# Patient Record
Sex: FEMALE | Race: WHITE | NOT HISPANIC OR LATINO | Employment: OTHER | URBAN - NONMETROPOLITAN AREA
[De-identification: names, ages, dates, MRNs, and addresses within clinical notes are randomized per-mention and may not be internally consistent; named-entity substitution may affect disease eponyms.]

---

## 2023-12-13 ENCOUNTER — APPOINTMENT (OUTPATIENT)
Dept: PRIMARY CARE | Facility: CLINIC | Age: 78
End: 2023-12-13
Payer: COMMERCIAL

## 2024-01-04 ENCOUNTER — HOSPITAL ENCOUNTER (EMERGENCY)
Facility: HOSPITAL | Age: 79
Discharge: HOME | End: 2024-01-04
Attending: EMERGENCY MEDICINE
Payer: COMMERCIAL

## 2024-01-04 VITALS
RESPIRATION RATE: 18 BRPM | OXYGEN SATURATION: 96 % | HEIGHT: 56 IN | TEMPERATURE: 97.5 F | BODY MASS INDEX: 32.62 KG/M2 | WEIGHT: 145 LBS | SYSTOLIC BLOOD PRESSURE: 162 MMHG | HEART RATE: 65 BPM | DIASTOLIC BLOOD PRESSURE: 69 MMHG

## 2024-01-04 DIAGNOSIS — M54.50 CHRONIC LEFT-SIDED LOW BACK PAIN WITHOUT SCIATICA: Primary | ICD-10-CM

## 2024-01-04 DIAGNOSIS — G89.29 CHRONIC LEFT-SIDED LOW BACK PAIN WITHOUT SCIATICA: Primary | ICD-10-CM

## 2024-01-04 PROCEDURE — 99284 EMERGENCY DEPT VISIT MOD MDM: CPT | Performed by: EMERGENCY MEDICINE

## 2024-01-04 PROCEDURE — 96372 THER/PROPH/DIAG INJ SC/IM: CPT

## 2024-01-04 PROCEDURE — 2500000001 HC RX 250 WO HCPCS SELF ADMINISTERED DRUGS (ALT 637 FOR MEDICARE OP): Performed by: EMERGENCY MEDICINE

## 2024-01-04 PROCEDURE — 2500000004 HC RX 250 GENERAL PHARMACY W/ HCPCS (ALT 636 FOR OP/ED): Performed by: EMERGENCY MEDICINE

## 2024-01-04 PROCEDURE — 96372 THER/PROPH/DIAG INJ SC/IM: CPT | Performed by: EMERGENCY MEDICINE

## 2024-01-04 PROCEDURE — 99283 EMERGENCY DEPT VISIT LOW MDM: CPT | Performed by: EMERGENCY MEDICINE

## 2024-01-04 RX ORDER — CYCLOBENZAPRINE HCL 10 MG
5 TABLET ORAL ONCE
Status: DISCONTINUED | OUTPATIENT
Start: 2024-01-04 | End: 2024-01-04 | Stop reason: HOSPADM

## 2024-01-04 RX ORDER — KETOROLAC TROMETHAMINE 30 MG/ML
15 INJECTION, SOLUTION INTRAMUSCULAR; INTRAVENOUS ONCE
Status: COMPLETED | OUTPATIENT
Start: 2024-01-04 | End: 2024-01-04

## 2024-01-04 RX ORDER — TRAMADOL HYDROCHLORIDE 50 MG/1
50 TABLET ORAL ONCE
Status: COMPLETED | OUTPATIENT
Start: 2024-01-04 | End: 2024-01-04

## 2024-01-04 RX ORDER — TRAMADOL HYDROCHLORIDE 50 MG/1
50 TABLET ORAL EVERY 6 HOURS PRN
Qty: 12 TABLET | Refills: 0 | Status: SHIPPED | OUTPATIENT
Start: 2024-01-04 | End: 2024-01-07

## 2024-01-04 RX ADMIN — KETOROLAC TROMETHAMINE 15 MG: 30 INJECTION, SOLUTION INTRAMUSCULAR; INTRAVENOUS at 07:21

## 2024-01-04 RX ADMIN — TRAMADOL HYDROCHLORIDE 50 MG: 50 TABLET, COATED ORAL at 08:27

## 2024-01-04 ASSESSMENT — PAIN - FUNCTIONAL ASSESSMENT: PAIN_FUNCTIONAL_ASSESSMENT: 0-10

## 2024-01-04 ASSESSMENT — PAIN SCALES - GENERAL
PAINLEVEL_OUTOF10: 8
PAINLEVEL_OUTOF10: 8

## 2024-01-04 ASSESSMENT — PAIN DESCRIPTION - DESCRIPTORS: DESCRIPTORS: CRAMPING

## 2024-01-04 ASSESSMENT — PAIN DESCRIPTION - PAIN TYPE: TYPE: ACUTE PAIN

## 2024-01-04 ASSESSMENT — COLUMBIA-SUICIDE SEVERITY RATING SCALE - C-SSRS
2. HAVE YOU ACTUALLY HAD ANY THOUGHTS OF KILLING YOURSELF?: NO
1. IN THE PAST MONTH, HAVE YOU WISHED YOU WERE DEAD OR WISHED YOU COULD GO TO SLEEP AND NOT WAKE UP?: NO
6. HAVE YOU EVER DONE ANYTHING, STARTED TO DO ANYTHING, OR PREPARED TO DO ANYTHING TO END YOUR LIFE?: NO

## 2024-01-04 ASSESSMENT — PAIN DESCRIPTION - LOCATION: LOCATION: BACK

## 2024-01-04 ASSESSMENT — PAIN DESCRIPTION - FREQUENCY: FREQUENCY: CONSTANT/CONTINUOUS

## 2024-01-04 NOTE — ED PROVIDER NOTES
HPI   Chief Complaint   Patient presents with    Back Pain     Pt is c/o lower back pain. Non radiating.        Limitations to History: None    HPI: 78-year-old female presents by EMS with concern for back pain.  States is her bilateral lower back.  History of chronic pain.  States that she has been having intermittent spasm.  Worse with movement.  Denies any fever, chills, loss of bowel or bladder, lower extremity numbness or tingling.  Denies any fall or trauma.    Additional History Obtained from: EMS    ------------------------------------------------------------------------------------------------------------------------------------------  Physical Exam:    VS: As documented in the triage note and EMR flowsheet from this visit were reviewed.    Appearance: Alert. cooperative,  in no acute distress.   Skin: Intact,  dry skin, no lesions, rash, petechiae or purpura.   Eyes: PERRLA, EOMs intact,  Conjunctiva pink with no redness or exudates.   HENT: Normocephalic, atraumatic. Nares patent. No intraoral lesions.   Neck: Supple, without meningismus. Trachea at midline. No lymphadenopathy.  Pulmonary: Clear bilaterally with good chest wall excursion. No rales, rhonchi or wheezing. No accessory muscle use or stridor.  Cardiac: Regular rate and rhythm, no rubs, murmurs, or gallops.   Abdomen: Abdomen is soft, nontender, and nondistended.  No palpable organomegaly.  No rebound or guarding.  No CVA tenderness. Nonsurgical abdomen  Genitourinary: Exam deferred.  Musculoskeletal: Full range of motion.  Pulses full and equal. No cyanosis, clubbing, or edema.  Tenderness to palpation of bilateral lumbar paraspinal musculature.  Neurological:  Cranial nerves are grossly intact, grossly normal sensation, no weakness, no focal findings identified.  Psychiatric: Appropriate mood and affect.                            Blain Coma Scale Score: 15                  Patient History   Past Medical History:   Diagnosis Date     Diabetes mellitus (CMS/HCC)     Hypertension      Past Surgical History:   Procedure Laterality Date    APPENDECTOMY      EYE SURGERY       No family history on file.  Social History     Tobacco Use    Smoking status: Never    Smokeless tobacco: Never   Substance Use Topics    Alcohol use: Not on file    Drug use: Not on file       Physical Exam   ED Triage Vitals [01/04/24 0647]   Temp Heart Rate Resp BP   36.4 °C (97.5 °F) 71 16 153/75      SpO2 Temp Source Heart Rate Source Patient Position   96 % Tympanic Monitor Sitting      BP Location FiO2 (%)     Left arm --       Physical Exam    ED Course & MDM   Diagnoses as of 01/04/24 0940   Chronic left-sided low back pain without sciatica       Medical Decision Making  Medical Decision Making:    Patient appears well nontoxic.  No red flag symptoms for cauda equina syndrome.  Chronic back pain.  Patient initially treated with intramuscular Toradol.  Patient declining cyclobenzaprine.  Patient states that tramadol has worked in the past.  Treated with 50 mg orally and patient feels much improved.  Will be given a short course for home.  No indication for imaging as there is no fall.  Advised on follow-up with primary care.  Stable at time of discharge.    Differential Diagnoses Considered: Lumbar strain, disc herniation, fracture, cauda equina syndrome, sciatica    Escalation of Care:  Appropriate for discharge and follow-up with primary care.    Prescription Drug Consideration: Oral tramadol.              Procedure  Procedures     Sony Miguel DO  01/04/24 [id number]

## 2024-01-08 ENCOUNTER — OFFICE VISIT (OUTPATIENT)
Dept: PRIMARY CARE | Facility: CLINIC | Age: 79
End: 2024-01-08
Payer: COMMERCIAL

## 2024-01-08 VITALS
BODY MASS INDEX: 33.88 KG/M2 | DIASTOLIC BLOOD PRESSURE: 74 MMHG | SYSTOLIC BLOOD PRESSURE: 112 MMHG | HEART RATE: 86 BPM | WEIGHT: 150.6 LBS | OXYGEN SATURATION: 93 % | HEIGHT: 56 IN

## 2024-01-08 DIAGNOSIS — I10 HYPERTENSION, UNSPECIFIED TYPE: ICD-10-CM

## 2024-01-08 DIAGNOSIS — R32 URINARY INCONTINENCE, UNSPECIFIED TYPE: Primary | ICD-10-CM

## 2024-01-08 DIAGNOSIS — E11.9 TYPE 2 DIABETES MELLITUS WITHOUT COMPLICATION, WITHOUT LONG-TERM CURRENT USE OF INSULIN (MULTI): ICD-10-CM

## 2024-01-08 DIAGNOSIS — B37.31 VAGINAL YEAST INFECTION: ICD-10-CM

## 2024-01-08 DIAGNOSIS — C54.9 MALIGNANT NEOPLASM OF CORPUS UTERI, EXCEPT ISTHMUS (MULTI): ICD-10-CM

## 2024-01-08 DIAGNOSIS — N32.9 BLADDER TROUBLES: ICD-10-CM

## 2024-01-08 DIAGNOSIS — M54.50 ACUTE LEFT-SIDED LOW BACK PAIN WITHOUT SCIATICA: ICD-10-CM

## 2024-01-08 PROBLEM — N39.45 CONTINUOUS LEAKAGE OF URINE: Status: ACTIVE | Noted: 2024-01-08

## 2024-01-08 PROCEDURE — 1160F RVW MEDS BY RX/DR IN RCRD: CPT | Performed by: STUDENT IN AN ORGANIZED HEALTH CARE EDUCATION/TRAINING PROGRAM

## 2024-01-08 PROCEDURE — 1036F TOBACCO NON-USER: CPT | Performed by: STUDENT IN AN ORGANIZED HEALTH CARE EDUCATION/TRAINING PROGRAM

## 2024-01-08 PROCEDURE — 99204 OFFICE O/P NEW MOD 45 MIN: CPT | Performed by: STUDENT IN AN ORGANIZED HEALTH CARE EDUCATION/TRAINING PROGRAM

## 2024-01-08 PROCEDURE — 1125F AMNT PAIN NOTED PAIN PRSNT: CPT | Performed by: STUDENT IN AN ORGANIZED HEALTH CARE EDUCATION/TRAINING PROGRAM

## 2024-01-08 PROCEDURE — 3078F DIAST BP <80 MM HG: CPT | Performed by: STUDENT IN AN ORGANIZED HEALTH CARE EDUCATION/TRAINING PROGRAM

## 2024-01-08 PROCEDURE — 3074F SYST BP LT 130 MM HG: CPT | Performed by: STUDENT IN AN ORGANIZED HEALTH CARE EDUCATION/TRAINING PROGRAM

## 2024-01-08 PROCEDURE — 1159F MED LIST DOCD IN RCRD: CPT | Performed by: STUDENT IN AN ORGANIZED HEALTH CARE EDUCATION/TRAINING PROGRAM

## 2024-01-08 RX ORDER — MECLIZINE HYDROCHLORIDE 25 MG/1
25 TABLET ORAL 3 TIMES DAILY PRN
COMMUNITY
Start: 2020-06-09 | End: 2024-04-09 | Stop reason: WASHOUT

## 2024-01-08 RX ORDER — AMLODIPINE BESYLATE 2.5 MG/1
5 TABLET ORAL
Qty: 60 TABLET | Refills: 11 | Status: SHIPPED | OUTPATIENT
Start: 2024-01-08 | End: 2024-04-09 | Stop reason: SDUPTHER

## 2024-01-08 RX ORDER — TRAMADOL HYDROCHLORIDE 50 MG/1
50 TABLET ORAL 2 TIMES DAILY PRN
COMMUNITY
End: 2024-01-12 | Stop reason: ALTCHOICE

## 2024-01-08 RX ORDER — NAPROXEN SODIUM 220 MG/1
81 TABLET, FILM COATED ORAL DAILY
COMMUNITY

## 2024-01-08 RX ORDER — FERROUS SULFATE 325(65) MG
325 TABLET ORAL
COMMUNITY

## 2024-01-08 RX ORDER — LOSARTAN POTASSIUM 100 MG/1
100 TABLET ORAL DAILY
COMMUNITY
Start: 2020-12-16 | End: 2024-05-28 | Stop reason: SDUPTHER

## 2024-01-08 RX ORDER — METFORMIN HYDROCHLORIDE 500 MG/1
500 TABLET, EXTENDED RELEASE ORAL
COMMUNITY
End: 2024-04-09 | Stop reason: SDUPTHER

## 2024-01-08 RX ORDER — ACETAMINOPHEN 500 MG
4000 TABLET ORAL DAILY
COMMUNITY

## 2024-01-08 RX ORDER — ACETAMINOPHEN 500 MG
500 TABLET ORAL EVERY 6 HOURS PRN
COMMUNITY

## 2024-01-08 RX ORDER — NITROGLYCERIN 0.4 MG/1
0.4 TABLET SUBLINGUAL EVERY 5 MIN PRN
COMMUNITY
End: 2024-04-09 | Stop reason: SDUPTHER

## 2024-01-08 RX ORDER — AMLODIPINE BESYLATE 2.5 MG/1
5 TABLET ORAL
COMMUNITY
Start: 2023-09-06 | End: 2024-01-08 | Stop reason: SDUPTHER

## 2024-01-08 RX ORDER — ONDANSETRON 4 MG/1
4 TABLET, ORALLY DISINTEGRATING ORAL EVERY 8 HOURS PRN
COMMUNITY
Start: 2023-05-20 | End: 2024-04-09 | Stop reason: WASHOUT

## 2024-01-08 RX ORDER — ROPINIROLE 1 MG/1
1 TABLET, FILM COATED ORAL
COMMUNITY
End: 2024-05-28 | Stop reason: SDUPTHER

## 2024-01-08 RX ORDER — METOPROLOL SUCCINATE 50 MG/1
25 TABLET, EXTENDED RELEASE ORAL 2 TIMES DAILY
COMMUNITY
End: 2024-04-09 | Stop reason: SDUPTHER

## 2024-01-08 RX ORDER — KETOCONAZOLE 20 MG/G
CREAM TOPICAL 2 TIMES DAILY
Qty: 30 G | Refills: 0 | Status: SHIPPED | OUTPATIENT
Start: 2024-01-08 | End: 2024-01-22

## 2024-01-08 RX ORDER — ATORVASTATIN CALCIUM 80 MG/1
80 TABLET, FILM COATED ORAL DAILY
COMMUNITY
End: 2024-04-18 | Stop reason: SDUPTHER

## 2024-01-08 NOTE — ASSESSMENT & PLAN NOTE
- Doing well without medication treatment  - she does not have complications of diabetes  - continue to monitor

## 2024-01-08 NOTE — ASSESSMENT & PLAN NOTE
-We discussed supportive care  She does not have any red flag symptoms at this time that make me concerned for cauda equina syndrome  -Physical therapy for strengthening and then attempt to decrease the frequency of back exacerbations

## 2024-01-08 NOTE — PROGRESS NOTES
Subjective:  Quyen Rolon is a 78 y.o. female who presents to clinic today for Establish Care      Back Pain:  - has been going on for over a week  - worst in her right lower back  - no difficulty with urination  - urine is clear  - she is typically incontinent, this is not new  - pain 5/10, she took a muscle relaxer prior to coming  - tramadol makes her nauseas  - she gets flairs a few times a year   - it doesn't take much for her to have a flair  - she take tylenol for pain but cannot take ibuprofen or aleve    Itching of vaginal area, going on for 2 weeks  - no pain  - no discharge or bleeding    Got her covid, flu and RSV vaccine.    Review of Systems    Assessment/Plan:  Quyen Rolon is a 78 y.o. female with a history of hypertension, CAD, uterine cancer who presents to clinic today to address the following issues:   1. Urinary incontinence, unspecified type        2. Bladder troubles        3. Vaginal yeast infection  ketoconazole (NIZOral) 2 % cream      4. Acute left-sided low back pain without sciatica  Referral to Physical Therapy      5. Hypertension, unspecified type  amLODIPine (Norvasc) 2.5 mg tablet      6. Malignant neoplasm of corpus uteri, except isthmus (CMS/HCC)        7. Type 2 diabetes mellitus without complication, without long-term current use of insulin (CMS/HCC)            Problem List Items Addressed This Visit       Urinary incontinence - Primary    Overview     Incontinence since hysterectomy in 2015.         Current Assessment & Plan     - continue home supplies         Malignant neoplasm of corpus uteri, except isthmus (CMS/HCC)    Overview     Uterine cancer approximately 2015 s/p total hysterectomy.         Current Assessment & Plan     - she has continued incontinence  - continue to monitor  - no recent bleeding or symptoms         Type 2 diabetes mellitus without complication, without long-term current use of insulin (CMS/HCC)    Overview     Hgb A1c  "6.6 in June 2023.         Current Assessment & Plan     - Doing well without medication treatment  - she does not have complications of diabetes  - continue to monitor         Acute left-sided low back pain without sciatica    Overview     Carito has had multiple episodes of low back pain over the last several years.  Typically she gets them every few months.  She has done physical therapy successfully in the past.         Current Assessment & Plan     -We discussed supportive care  She does not have any red flag symptoms at this time that make me concerned for cauda equina syndrome  -Physical therapy for strengthening and then attempt to decrease the frequency of back exacerbations           Relevant Orders    Referral to Physical Therapy    Bladder troubles     Other Visit Diagnoses       Vaginal yeast infection        Relevant Medications    ketoconazole (NIZOral) 2 % cream    Hypertension, unspecified type        Relevant Medications    amLODIPine (Norvasc) 2.5 mg tablet            Follow up: 3 months    Return precautions discussed.  An After Visit Summary was given to the patient.  All questions were answered and patient in agreement with plan.    Objective:  /74   Pulse 86   Ht 1.422 m (4' 8\")   Wt 68.3 kg (150 lb 9.6 oz)   SpO2 93%   BMI 33.76 kg/m²     Physical Exam  Vitals and nursing note reviewed.   Constitutional:       General: She is not in acute distress.     Appearance: Normal appearance.   HENT:      Head: Normocephalic and atraumatic.      Mouth/Throat:      Mouth: Mucous membranes are moist.   Eyes:      General: No scleral icterus.        Right eye: No discharge.         Left eye: No discharge.      Extraocular Movements: Extraocular movements intact.      Conjunctiva/sclera: Conjunctivae normal.   Cardiovascular:      Rate and Rhythm: Rhythm irregular.   Pulmonary:      Effort: Pulmonary effort is normal. No respiratory distress.      Breath sounds: Normal breath sounds. "   Genitourinary:         Comments: Redness and irritation of vaginal mucosa  Musculoskeletal:      Comments: Tenderness to palpation over left lower back, no flank pain   Skin:     General: Skin is warm and dry.   Neurological:      General: No focal deficit present.      Mental Status: She is alert and oriented to person, place, and time.   Psychiatric:         Attention and Perception: Attention normal.         Mood and Affect: Mood normal.         Speech: Speech normal.         Behavior: Behavior normal.         Cognition and Memory: Cognition and memory normal.         Judgment: Judgment normal.         I spent 29 minutes in total time for this visit including all related clinical activities before, during, and after the visit excluding other billable activities/procedure time.     Grecia Jarvis MD

## 2024-01-09 ENCOUNTER — TELEPHONE (OUTPATIENT)
Dept: PRIMARY CARE | Facility: CLINIC | Age: 79
End: 2024-01-09
Payer: COMMERCIAL

## 2024-01-09 NOTE — TELEPHONE ENCOUNTER
Called and left patient voicemail with her appointment time and date for [facility] for [facility] Therapy. For Monday February 12th at 11:30 a.m.

## 2024-01-12 ENCOUNTER — TELEPHONE (OUTPATIENT)
Dept: PRIMARY CARE | Facility: CLINIC | Age: 79
End: 2024-01-12
Payer: COMMERCIAL

## 2024-01-12 DIAGNOSIS — M54.50 ACUTE LEFT-SIDED LOW BACK PAIN WITHOUT SCIATICA: Primary | ICD-10-CM

## 2024-01-12 RX ORDER — NALOXONE HYDROCHLORIDE 4 MG/.1ML
4 SPRAY NASAL AS NEEDED
Qty: 2 EACH | Refills: 0 | Status: SHIPPED | OUTPATIENT
Start: 2024-01-12 | End: 2024-06-03 | Stop reason: WASHOUT

## 2024-01-12 RX ORDER — CYCLOBENZAPRINE HCL 5 MG
5 TABLET ORAL NIGHTLY PRN
Qty: 10 TABLET | Refills: 0 | Status: SHIPPED | OUTPATIENT
Start: 2024-01-12 | End: 2024-01-22

## 2024-01-12 NOTE — TELEPHONE ENCOUNTER
Is asking for refill on muscle relaxer and tramadol.  She states she didn't think you would refill tramadol.  Please advise.

## 2024-01-12 NOTE — TELEPHONE ENCOUNTER
It is unsafe to take these medications together and may cause death. I sent a small prescription (10 pills of the muscle relaxer). If she thinks she  needs more please have her schedule an appointment (cirtual okay) so we can discuss the serious risks of combining the two.    Grecia Jarvis MD

## 2024-02-12 ENCOUNTER — EVALUATION (OUTPATIENT)
Dept: PHYSICAL THERAPY | Facility: CLINIC | Age: 79
End: 2024-02-12
Payer: COMMERCIAL

## 2024-02-12 DIAGNOSIS — M54.50 ACUTE LEFT-SIDED LOW BACK PAIN WITHOUT SCIATICA: ICD-10-CM

## 2024-02-12 PROCEDURE — 97161 PT EVAL LOW COMPLEX 20 MIN: CPT | Mod: GP

## 2024-02-12 PROCEDURE — 97110 THERAPEUTIC EXERCISES: CPT | Mod: GP

## 2024-02-12 ASSESSMENT — PATIENT HEALTH QUESTIONNAIRE - PHQ9
2. FEELING DOWN, DEPRESSED OR HOPELESS: NOT AT ALL
SUM OF ALL RESPONSES TO PHQ9 QUESTIONS 1 AND 2: 0
1. LITTLE INTEREST OR PLEASURE IN DOING THINGS: NOT AT ALL

## 2024-02-12 ASSESSMENT — ENCOUNTER SYMPTOMS
DEPRESSION: 0
LOSS OF SENSATION IN FEET: 0
OCCASIONAL FEELINGS OF UNSTEADINESS: 1

## 2024-02-12 ASSESSMENT — PAIN SCALES - GENERAL: PAINLEVEL_OUTOF10: 4

## 2024-02-12 ASSESSMENT — PAIN - FUNCTIONAL ASSESSMENT: PAIN_FUNCTIONAL_ASSESSMENT: 0-10

## 2024-02-12 NOTE — PROGRESS NOTES
Physical Therapy    Physical Therapy Lumbar Spine Evaluation    Patient Name: Quyen Rolon  MRN: [id number]  Today's Date: 2024  Time Calculation  Start Time: 1130  Stop Time: 1215  Time Calculation (min): 45 min  PT Evaluation Time Entry  PT Evaluation (Low) Time Entry: 28  PT Therapeutic Procedures Time Entry  Therapeutic Exercise Time Entry: 15                   Current Problem  Problem List Items Addressed This Visit             ICD-10-CM    Acute left-sided low back pain without sciatica M54.50    Relevant Orders    Follow Up In Physical Therapy        SUBJECTIVE  Subjective     General  Reason for Referral: acute L sided LBP  Referred By: Matheus  Past Medical History Relevant to Rehab: B TKA's, R ONEYDA, L foot surgery  General Comment: 1/ Med necessity  Precautions  Precautions  STEADI Fall Risk Score (The score of 4 or more indicates an increased risk of falling): 6  Precautions Comment: high fall risk       Pain  Pain Assessment: 0-10  Pain Score: 4  Pain Type: Acute pain  Pain Location: Back  Pain Orientation: Right, Left  Pain Radiating Towards: down L buttock, lateral L leg, across to anterior knee and medial lower leg to L big toe      SUBJECTIVE:   Patient verified by name and .  Patient notes that 6 weeks ago she began having R side low back pain and L sciatic pain without any precipitating event. She notes the pain was so bad she had to just lay down, she could not sit or walk, she used heat and Aspercreme to help with the symptoms. She notes it took approx 4-5 weeks for the symptoms to improve but that have improved significantly. She notes that now she can sit for approx 1 hour before she has those symptoms return. She has the symptoms as soon as she stands or walks, she can push herself and stand/walk for approx 5 minutes now. She notes her L sciatic nerve pain leg will give out on her if she pushes herself.   Her pain travels down L buttock, lateral L leg, across to anterior knee and  "medial lower leg to L big toe.  She has had PT in the past approx 5 years ago and it was helpful.    Pain Better:  Heat, laying down-R side, Aspercreme    Pain Worse:  Sitting, standing, walking    Prior level of function   Level of [place]: Independent with ADLs and functional transfers, Needs assistance with homemaking    Current limitations:   Sitting 1 hour, standing/walking 5 mins, bathing/dressing difficult-Passport supposed to assisting but not, bending forward for socks/pants difficult.  Home setup:  Home Living Comment: lives alone in an apartment, first floor, no stairs, walk in shower  Work:  Retired-homemaker  Patients goal:  Patient's goal is to have less pain to be able to move more and not have to lay down to get rid of pain  Prior tx:  PT 5 years ago-successfl.    Objective:    Lower Extremity Strength:  Date: eval Myotome RIGHT LEFT   Hip Flexion L1,2 4 4   Hip ext      Hip abd  4- 4-   Hip add      Knee Extension L3 4+ 4+   Knee Flexion  4 4   Ankle DF L4 5 5   Great toe Ext L5 5 5     Lumbar ROM:  Date: eval Percentage    Flexion 50%    Extension Unable to come to neutral-forward flexed     RIGHT LEFT   Side Bend 25% 25%   Rotation         Posture: scoliotic curve present, forward flexed posture-unable to come to neutral  Neurological symptoms: none  Sensation: intact and symmetrical at dermatomes  Outcome Measure  Other Measures  Other Outcome Measures: Modified Oswestry 25/50      Special tests:  Lumbar:   Supine flat 7/10 across low back  Hooklying 4/10 lateral thigh to knee only  DKTC 3/10     TREATMENT:  SKTC x 10\" x 10  Figure 4 piriformis stretch 20\" x 3 L  LTR x 10 ea side       OP EDUCATION:  Access Code: [email or web address]  URL: [email or web address]  Date: 02/12/2024  Prepared by: Criselda Hutson    Exercises  - Supine Lower Trunk Rotation  - 1 x daily - 7 x weekly - 2 sets - 10 reps  - Supine Figure 4 Piriformis Stretch  - 1 x daily - 7 x weekly - 1 sets - 3 reps - 20 " hold  - Hooklying Single Knee to Chest Stretch with Towel  - 1 x daily - 7 x weekly - 1 sets - 10 reps - 10 hold  ASSESSEMENT  Pt is a 78 y.o. referred to therapy for dx of LBP by Grecia Jarvis MD . Pt presents with impairments in PT Assessment Results: Decreased strength, Decreased range of motion, Impaired balance, Decreased mobility, Pain  Rehab Prognosis: Good  Patient would benefit from a therapy program to restore prior level of function, decrease pain, increase AROM, increase strength and improve posture.    The physical therapy prognosis is good for the patient to achieve their goals.   The pt tolerated therapy treatment today well with no adverse effects.  Barriers to therapy include:  no barriers to learning    PLAN  PT Frequency: 2 times per week  Duration: 4 weeks      The pt has been educated about the risks and benefits of physical therapy including manual therapy treatments and gives consent for treatment.     The patient will benefit from physical therapy treatment to include: Treatment/Interventions: Education/ Instruction, Gait training, Hot pack, Cryotherapy, Manual therapy, Neuromuscular re-education, Self care/ home management, Therapeutic activities, Therapeutic exercises       Goals:  Active       low back pain       Patient will demonstrate understanding of HEP to augment PT POC and continue with management of low back symptoms after discharge        Start:  02/13/24    Expected End:  03/12/24            Patient will demonstrate improved BLE strength to 4+/5 in her available range to allow for improved ease with walking 10 minutes or longer       Start:  02/13/24    Expected End:  03/12/24            Patient will demonstrate painfree lumbar FB to 75% to allow for improved ease with getting dressed/bathed independently       Start:  02/13/24    Expected End:  03/12/24            Patient will score 40% or less on Modified Oswestry to allow for improved independence within her home        Start:  02/13/24    Expected End:  03/12/24

## 2024-02-23 ENCOUNTER — TREATMENT (OUTPATIENT)
Dept: PHYSICAL THERAPY | Facility: CLINIC | Age: 79
End: 2024-02-23
Payer: COMMERCIAL

## 2024-02-23 DIAGNOSIS — M54.50 ACUTE LEFT-SIDED LOW BACK PAIN WITHOUT SCIATICA: ICD-10-CM

## 2024-02-23 PROCEDURE — 97110 THERAPEUTIC EXERCISES: CPT | Mod: GP,CQ

## 2024-02-23 PROCEDURE — 97140 MANUAL THERAPY 1/> REGIONS: CPT | Mod: GP,CQ

## 2024-02-23 ASSESSMENT — PAIN SCALES - GENERAL: PAINLEVEL_OUTOF10: 5 - MODERATE PAIN

## 2024-02-23 ASSESSMENT — PAIN - FUNCTIONAL ASSESSMENT: PAIN_FUNCTIONAL_ASSESSMENT: 0-10

## 2024-02-23 NOTE — PROGRESS NOTES
"Physical Therapy Treatment    Patient Name: Quyen Rolon  MRN: [id number]  Today's Date: 2/23/2024  Time Calculation  Start Time: 0915  Stop Time: 0959  Time Calculation (min): 44 min  PT Therapeutic Procedures Time Entry  Manual Therapy Time Entry: 16  Therapeutic Exercise Time Entry: 26       Assessment:   Patient identified by name and date of birth. Patient required Kaylene with transfers from supine to sit. She presented with palpable tension in her gluts and IT band that responded well to STW.    Plan:  OP PT Plan  Treatment/Interventions: Education/ Instruction, Gait training, Hot pack, Cryotherapy, Manual therapy, Neuromuscular re-education, Self care/ home management, Therapeutic activities, Therapeutic exercises  PT Plan: Skilled PT  PT Frequency: 2 times per week  Duration: 4 weeks  Certification Period Start Date: 02/12/24  Certification Period End Date: 05/12/24  Rehab Potential: Good  Plan of Care Agreement: Patient  Continue with progression of core strengthening and ROM with STW as needed for increased ease with ambulation tolerance.   Current Problem  Problem List Items Addressed This Visit             ICD-10-CM    Acute left-sided low back pain without sciatica M54.50       Subjective Patient reported compliance with % of the time and verbalized understanding.  She reported 3/10 pain after treatment.   General  Reason for Referral: acute L sided LBP  Referred By: Matheus  Past Medical History Relevant to Rehab: B TKA's, R ONEYDA, L foot surgery  General Comment: 1/ Med necessity  Precautions  Precautions  Precautions Comment: high fall risk    Pain  Pain Assessment: 0-10  Pain Score: 5 - Moderate pain  Pain Type: Acute pain  Pain Location: Back  Pain Orientation: Left     Treatments:  Therapeutic Exercise  Therapeutic Exercise Performed: Yes  SKTC x 10\" x 10  Figure 4 piriformis stretch 20\" x 3 L  LTR x 10 ea side   Hip flex stretch 3 x 20\"(N)  Hooklying TrA x10 5\"Hold (N)  Hooklying TrA 2 x 10 " "marching (N)  Hooklying TrA with hip abd 2 x 10 orange (N)  Hooklying TrA with hip add x 10 3\" Hold (N)  IT band stretch (A)  HS stretch (A)     Manual Therapy  Manual Therapy Performed: Yes  STW to B gluts and IT band      OP EDUCATION:   Access Code: [email or web address]  URL: [email or web address]  Date: 02/12/2024  Prepared by: Criselda Hutson     Exercises  - Supine Lower Trunk Rotation  - 1 x daily - 7 x weekly - 2 sets - 10 reps  - Supine Figure 4 Piriformis Stretch  - 1 x daily - 7 x weekly - 1 sets - 3 reps - 20 hold  - Hooklying Single Knee to Chest Stretch with Towel  - 1 x daily - 7 x weekly - 1 sets - 10 reps - 10 hold  Access Code: [email or web address]  URL: [email or web address]  Date: 02/23/2024  Prepared by: Karolyn Escamilla    Exercises  - Supine Modified Hip Flexor Stretch at Edge of Bed  - 1 x daily - 7 x weekly - 1 sets - 3 reps - 20-30 hold  - Hooklying Transversus Abdominis Palpation  - 1 x daily - 7 x weekly - 3 sets - 10 reps - 3-5 hold  - Supine March  - 1 x daily - 7 x weekly - 2 sets - 10 reps  - Supine Hip Adduction Isometric with Ball  - 1 x daily - 7 x weekly - 2 sets - 10 reps - 5-10 hold  - Hooklying Isometric Hip Abduction with Belt  - 1 x daily - 7 x weekly - 2 sets - 10 reps  - Supine Bridge  - 1 x daily - 7 x weekly - 2 sets - 10 reps    Goals:  Active       low back pain       Patient will demonstrate understanding of HEP to augment PT POC and continue with management of low back symptoms after discharge        Start:  02/13/24    Expected End:  03/12/24            Patient will demonstrate improved BLE strength to 4+/5 in her available range to allow for improved ease with walking 10 minutes or longer       Start:  02/13/24    Expected End:  03/12/24            Patient will demonstrate painfree lumbar FB to 75% to allow for improved ease with getting dressed/bathed independently       Start:  02/13/24    Expected End:  03/12/24            Patient will score " 40% or less on Modified Oswestry to allow for improved independence within her home       Start:  02/13/24    Expected End:  03/12/24

## 2024-02-28 ENCOUNTER — TREATMENT (OUTPATIENT)
Dept: PHYSICAL THERAPY | Facility: CLINIC | Age: 79
End: 2024-02-28
Payer: COMMERCIAL

## 2024-02-28 DIAGNOSIS — M54.50 ACUTE LEFT-SIDED LOW BACK PAIN WITHOUT SCIATICA: ICD-10-CM

## 2024-02-28 PROCEDURE — 97140 MANUAL THERAPY 1/> REGIONS: CPT | Mod: GP,CQ

## 2024-02-28 PROCEDURE — 97110 THERAPEUTIC EXERCISES: CPT | Mod: GP,CQ

## 2024-02-28 ASSESSMENT — PAIN - FUNCTIONAL ASSESSMENT: PAIN_FUNCTIONAL_ASSESSMENT: 0-10

## 2024-02-28 ASSESSMENT — PAIN SCALES - GENERAL: PAINLEVEL_OUTOF10: 3

## 2024-02-28 NOTE — PROGRESS NOTES
"Physical Therapy Treatment    Patient Name: Quyen Rolon  MRN: [id number]  Today's Date: 2/28/2024  Time Calculation  Start Time: 0745  Stop Time: 0830  Time Calculation (min): 45 min  PT Therapeutic Procedures Time Entry  Manual Therapy Time Entry: 10  Therapeutic Exercise Time Entry: 33       Assessment:   Patient identified by name and date of birth. Patient required cues throughout treatment to decrease with breath holding. She demonstrated fair TrA contraction with palpation and required cues to maintain with activities. Added IT band and HS stretch and instructed for HEP.     Plan:  OP PT Plan  Treatment/Interventions: Education/ Instruction, Gait training, Hot pack, Cryotherapy, Manual therapy, Neuromuscular re-education, Self care/ home management, Therapeutic activities, Therapeutic exercises  PT Plan: Skilled PT  PT Frequency: 2 times per week  Duration: 4 weeks  Certification Period Start Date: 02/12/24  Certification Period End Date: 05/12/24  Rehab Potential: Good  Plan of Care Agreement: Patient  Continue with progression of core and LE strengthening and ROM for decreased Sx and increased ease with ambulation and household chores. AW  Current Problem  Problem List Items Addressed This Visit             ICD-10-CM    Acute left-sided low back pain without sciatica M54.50       Subjective Patient reported compliance with % of the time and verbalized understanding.  She reported 2/10 pain in groin after treatment.   General  Reason for Referral: acute L sided LBP  Referred By: Matheus  Past Medical History Relevant to Rehab: B TKA's, R ONEYDA, L foot surgery  General Comment: 3/ Med necessity  Precautions  Precautions  Precautions Comment: high fall risk    Pain  Pain Assessment: 0-10  Pain Score: 3  Pain Type: Acute pain  Pain Location: Back  Pain Orientation: Left     Treatments:  Therapeutic Exercise  Therapeutic Exercise Performed: Yes  Seated stepper 5' (N)  SKTC x 10\" x 5  Figure 4 piriformis " "stretch 20\" x 3 L  LTR x 10 ea side   Hip flex stretch 3 x 20\"  Hooklying TrA x10 5\"Hold   Hooklying TrA 2 x 10 marching   Hooklying TrA with hip abd 2 x 10 green  Hooklying TrA with hip add 2x 10 3\" Hold   IT band stretch with strap x10 10\"  (N)  HS stretch with strap x10 10\"  (N)     Manual Therapy  Manual Therapy Performed: Yes   STW to L gluts and IT band      OP EDUCATION:   Access Code: [email or web address]  URL: [email or web address]  Date: 02/28/2024  Prepared by: Karolyn Escamilla    Exercises  - Supine ITB Stretch with Strap  - 1 x daily - 7 x weekly - 1 sets - 10 reps - 10 hold  - Supine Hamstring Stretch with Strap  - 1 x daily - 7 x weekly - 1 sets - 10 reps - 104 hold  Access Code: [email or web address]  URL: [email or web address]  Date: 02/12/2024  Prepared by: Criselda Hutson     Exercises  - Supine Lower Trunk Rotation  - 1 x daily - 7 x weekly - 2 sets - 10 reps  - Supine Figure 4 Piriformis Stretch  - 1 x daily - 7 x weekly - 1 sets - 3 reps - 20 hold  - Hooklying Single Knee to Chest Stretch with Towel  - 1 x daily - 7 x weekly - 1 sets - 10 reps - 10 hold  Access Code: [email or web address]  URL: [email or web address]  Date: 02/23/2024  Prepared by: Karolyn Escamilla     Exercises  - Supine Modified Hip Flexor Stretch at Edge of Bed  - 1 x daily - 7 x weekly - 1 sets - 3 reps - 20-30 hold  - Hooklying Transversus Abdominis Palpation  - 1 x daily - 7 x weekly - 3 sets - 10 reps - 3-5 hold  - Supine March  - 1 x daily - 7 x weekly - 2 sets - 10 reps  - Supine Hip Adduction Isometric with Ball  - 1 x daily - 7 x weekly - 2 sets - 10 reps - 5-10 hold  - Hooklying Isometric Hip Abduction with Belt  - 1 x daily - 7 x weekly - 2 sets - 10 reps  - Supine Bridge  - 1 x daily - 7 x weekly - 2 sets - 10 reps       Goals:  Active       low back pain       Patient will demonstrate understanding of HEP to augment PT POC and continue with management of low back symptoms after discharge  "       Start:  02/13/24    Expected End:  03/12/24            Patient will demonstrate improved BLE strength to 4+/5 in her available range to allow for improved ease with walking 10 minutes or longer       Start:  02/13/24    Expected End:  03/12/24            Patient will demonstrate painfree lumbar FB to 75% to allow for improved ease with getting dressed/bathed independently       Start:  02/13/24    Expected End:  03/12/24            Patient will score 40% or less on Modified Oswestry to allow for improved independence within her home       Start:  02/13/24    Expected End:  03/12/24

## 2024-03-01 ENCOUNTER — TREATMENT (OUTPATIENT)
Dept: PHYSICAL THERAPY | Facility: CLINIC | Age: 79
End: 2024-03-01
Payer: COMMERCIAL

## 2024-03-01 DIAGNOSIS — M54.50 ACUTE LEFT-SIDED LOW BACK PAIN WITHOUT SCIATICA: ICD-10-CM

## 2024-03-01 PROCEDURE — 97110 THERAPEUTIC EXERCISES: CPT | Mod: GP,CQ

## 2024-03-01 ASSESSMENT — PAIN SCALES - GENERAL: PAINLEVEL_OUTOF10: 2

## 2024-03-01 ASSESSMENT — PAIN - FUNCTIONAL ASSESSMENT: PAIN_FUNCTIONAL_ASSESSMENT: 0-10

## 2024-03-01 NOTE — PROGRESS NOTES
"Physical Therapy Treatment    Patient Name: Quyen Rolon  MRN: [id number]  Today's Date: 3/1/2024  Time Calculation  Start Time: 1530  Stop Time: 1615  Time Calculation (min): 45 min  PT Therapeutic Procedures Time Entry  Therapeutic Exercise Time Entry: 43        General:  General  Reason for Referral: acute L sided LBP  Referred By: Matheus  Past Medical History Relevant to Rehab: B TKA's, R ONEYDA, L foot surgery  General Comment: 4/ Med necessity    Assessment:Patient identified by name and date of birth.      Patient needed cues to slow movements with DLS.   Tightness with all stretches this date.   Checked cane height d/t c/o feeling too bent over with ambulation with the cane. Cane height was already adjusted properly.     Plan:  Plan to progress strength as zak. JA    OP PT Plan  Treatment/Interventions: Education/ Instruction, Gait training, Hot pack, Cryotherapy, Manual therapy, Neuromuscular re-education, Self care/ home management, Therapeutic activities, Therapeutic exercises  PT Plan: Skilled PT  PT Frequency: 2 times per week  Duration: 4 weeks  Certification Period Start Date: 02/12/24  Certification Period End Date: 05/12/24  Rehab Potential: Good  Plan of Care Agreement: Patient    Current Problem  Problem List Items Addressed This Visit             ICD-10-CM    Acute left-sided low back pain without sciatica M54.50       Subjective Patient is compliant with HEP completion with no issues to report at this time.  States that her sciatic area on the L is bothering her today. Patient feels like she is bent over when walking with assistive device.     Precautions  Precautions  Precautions Comment: high fall risk    Pain  Pain Assessment: 0-10  Pain Score: 2  Pain Type: Acute pain  Pain Location: Back  Pain Orientation: Left    Treatments:  Therapeutic Exercise Performed: Yes  Seated stepper 5'   SKTC x 10\" x 5  Figure 4 piriformis stretch 20\" x 3 L  LTR x 10 ea side   Hip flex stretch 3 x " "20\"  Hooklying TrA x10 5\"Hold   Hooklying TrA 2 x 10 marching   Hooklying TrA with hip abd 2 x 10 green  Hooklying TrA with hip add 2x 10 3\" Hold   IT band stretch with strap x10 10\"   HS stretch with strap x10 10\"      Manual Therapy  Manual Therapy Performed: Yes   STW to L gluts and IT band      OP EDUCATION:   Access Code: [email or web address]  URL: [email or web address]  Date: 02/28/2024  Prepared by: Karolyn Escamilla     Exercises  - Supine ITB Stretch with Strap  - 1 x daily - 7 x weekly - 1 sets - 10 reps - 10 hold  - Supine Hamstring Stretch with Strap  - 1 x daily - 7 x weekly - 1 sets - 10 reps - 104 hold  Access Code: [email or web address]  URL: [email or web address]  Date: 02/12/2024  Prepared by: Criselda Hutson     Exercises  - Supine Lower Trunk Rotation  - 1 x daily - 7 x weekly - 2 sets - 10 reps  - Supine Figure 4 Piriformis Stretch  - 1 x daily - 7 x weekly - 1 sets - 3 reps - 20 hold  - Hooklying Single Knee to Chest Stretch with Towel  - 1 x daily - 7 x weekly - 1 sets - 10 reps - 10 hold  Access Code: [email or web address]  URL: [email or web address]  Date: 02/23/2024  Prepared by: Karolyn Escamilla     Exercises  - Supine Modified Hip Flexor Stretch at Edge of Bed  - 1 x daily - 7 x weekly - 1 sets - 3 reps - 20-30 hold  - Hooklying Transversus Abdominis Palpation  - 1 x daily - 7 x weekly - 3 sets - 10 reps - 3-5 hold  - Supine March  - 1 x daily - 7 x weekly - 2 sets - 10 reps  - Supine Hip Adduction Isometric with Ball  - 1 x daily - 7 x weekly - 2 sets - 10 reps - 5-10 hold  - Hooklying Isometric Hip Abduction with Belt  - 1 x daily - 7 x weekly - 2 sets - 10 reps  - Supine Bridge  - 1 x daily - 7 x weekly - 2 sets - 10 reps    Goals:  Active       low back pain       Patient will demonstrate understanding of HEP to augment PT POC and continue with management of low back symptoms after discharge        Start:  02/13/24    Expected End:  03/12/24            Patient " will demonstrate improved BLE strength to 4+/5 in her available range to allow for improved ease with walking 10 minutes or longer       Start:  02/13/24    Expected End:  03/12/24            Patient will demonstrate painfree lumbar FB to 75% to allow for improved ease with getting dressed/bathed independently       Start:  02/13/24    Expected End:  03/12/24            Patient will score 40% or less on Modified Oswestry to allow for improved independence within her home       Start:  02/13/24    Expected End:  03/12/24

## 2024-03-06 ENCOUNTER — TREATMENT (OUTPATIENT)
Dept: PHYSICAL THERAPY | Facility: CLINIC | Age: 79
End: 2024-03-06
Payer: COMMERCIAL

## 2024-03-06 DIAGNOSIS — M54.50 ACUTE LEFT-SIDED LOW BACK PAIN WITHOUT SCIATICA: ICD-10-CM

## 2024-03-06 PROCEDURE — 97110 THERAPEUTIC EXERCISES: CPT | Mod: GP,CQ

## 2024-03-06 PROCEDURE — 97140 MANUAL THERAPY 1/> REGIONS: CPT | Mod: GP,CQ

## 2024-03-06 ASSESSMENT — PAIN SCALES - GENERAL: PAINLEVEL_OUTOF10: 7

## 2024-03-06 ASSESSMENT — PAIN - FUNCTIONAL ASSESSMENT: PAIN_FUNCTIONAL_ASSESSMENT: 0-10

## 2024-03-06 NOTE — PROGRESS NOTES
"Physical Therapy Treatment    Patient Name: Quyen Rolon  MRN: [id number]  Today's Date: 3/6/2024  Time Calculation  Start Time: 1000  Stop Time: 1043  Time Calculation (min): 43 min  PT Therapeutic Procedures Time Entry  Manual Therapy Time Entry: 10  Therapeutic Exercise Time Entry: 31       Assessment:   Patient identified by name and date of birth. Patient demonstrated good understanding of exercises with cues at times for proper form. She presented with palpable tension in B IT bands that responded well to STW.     Plan:  OP PT Plan  Treatment/Interventions: Education/ Instruction, Gait training, Hot pack, Cryotherapy, Manual therapy, Neuromuscular re-education, Self care/ home management, Therapeutic activities, Therapeutic exercises  PT Plan: Skilled PT  PT Frequency: 2 times per week  Duration: 4 weeks  Certification Period Start Date: 02/12/24  Certification Period End Date: 05/12/24  Rehab Potential: Good  Plan of Care Agreement: Patient  Continue with core strengthening and ROM with STW as needed for reduction of Sx and increased ease with household chores.   Current Problem  Problem List Items Addressed This Visit             ICD-10-CM    Acute left-sided low back pain without sciatica M54.50       Subjective Patient reported compliance with % of the time and verbalized understanding.  She reported 2/10 pain that increased to a 4/10 pain after treatment.   General  Reason for Referral: acute L sided LBP  Referred By: Matheus  Past Medical History Relevant to Rehab: B TKA's, R ONEYDA, L foot surgery  General Comment: 4/ Med necessity  Precautions  Precautions  Precautions Comment: high fall risk    Pain  Pain Assessment: 0-10  Pain Score: 7  Pain Type: Acute pain  Pain Location: Back  Pain Orientation: Right, Left     Treatments:  Therapeutic Exercise  Therapeutic Exercise Performed: Yes  Seated stepper 5'   SKTC x 10\" x 5  Figure 4 piriformis stretch 20\" x 3 L  LTR x 10 ea side   Hip flex stretch " "3 x 20\"  Hooklying TrA x10 5\"Hold   Hooklying TrA 2 x 10 marching   Hooklying TrA with hip abd 2 x 10 green  Hooklying TrA with hip add 2x 10 3\" Hold   IT band stretch with strap x10 10\"   HS stretch with strap x10 10\"     Manual Therapy  Manual Therapy Performed: Yes   Manual Therapy Performed: Yes   STW to L gluts and IT band       OP EDUCATION:    Access Code: [email or web address]  URL: [email or web address]  Date: 02/28/2024  Prepared by: Karolyn Escamilla     Exercises  - Supine ITB Stretch with Strap  - 1 x daily - 7 x weekly - 1 sets - 10 reps - 10 hold  - Supine Hamstring Stretch with Strap  - 1 x daily - 7 x weekly - 1 sets - 10 reps - 104 hold  Access Code: [email or web address]  URL: [email or web address]  Date: 02/12/2024  Prepared by: Criselda Hutson     Exercises  - Supine Lower Trunk Rotation  - 1 x daily - 7 x weekly - 2 sets - 10 reps  - Supine Figure 4 Piriformis Stretch  - 1 x daily - 7 x weekly - 1 sets - 3 reps - 20 hold  - Hooklying Single Knee to Chest Stretch with Towel  - 1 x daily - 7 x weekly - 1 sets - 10 reps - 10 hold  Access Code: [email or web address]  URL: [email or web address]  Date: 02/23/2024  Prepared by: Karolyn Escamilla     Exercises  - Supine Modified Hip Flexor Stretch at Edge of Bed  - 1 x daily - 7 x weekly - 1 sets - 3 reps - 20-30 hold  - Hooklying Transversus Abdominis Palpation  - 1 x daily - 7 x weekly - 3 sets - 10 reps - 3-5 hold  - Supine March  - 1 x daily - 7 x weekly - 2 sets - 10 reps  - Supine Hip Adduction Isometric with Ball  - 1 x daily - 7 x weekly - 2 sets - 10 reps - 5-10 hold  - Hooklying Isometric Hip Abduction with Belt  - 1 x daily - 7 x weekly - 2 sets - 10 reps  - Supine Bridge  - 1 x daily - 7 x weekly - 2 sets - 10 reps       Goals:  Active       low back pain       Patient will demonstrate understanding of HEP to augment PT POC and continue with management of low back symptoms after discharge        Start:  02/13/24    " Expected End:  03/12/24            Patient will demonstrate improved BLE strength to 4+/5 in her available range to allow for improved ease with walking 10 minutes or longer       Start:  02/13/24    Expected End:  03/12/24            Patient will demonstrate painfree lumbar FB to 75% to allow for improved ease with getting dressed/bathed independently       Start:  02/13/24    Expected End:  03/12/24            Patient will score 40% or less on Modified Oswestry to allow for improved independence within her home       Start:  02/13/24    Expected End:  03/12/24

## 2024-03-08 ENCOUNTER — APPOINTMENT (OUTPATIENT)
Dept: PHYSICAL THERAPY | Facility: CLINIC | Age: 79
End: 2024-03-08
Payer: COMMERCIAL

## 2024-03-11 ENCOUNTER — APPOINTMENT (OUTPATIENT)
Dept: PHYSICAL THERAPY | Facility: CLINIC | Age: 79
End: 2024-03-11
Payer: COMMERCIAL

## 2024-03-11 ENCOUNTER — DOCUMENTATION (OUTPATIENT)
Dept: PHYSICAL THERAPY | Facility: CLINIC | Age: 79
End: 2024-03-11
Payer: COMMERCIAL

## 2024-03-11 NOTE — PROGRESS NOTES
Physical Therapy    Discharge Summary    Name: Quyen Rolon  MRN: [id number]  : 1945  Date: 24    Discharge Summary: PT    Discharge Information: Date of last visit 3/6/2024    Therapy Summary: Patient called in to cancel all of her remaining appointments due to not feeling she is making progress with PT. Patient self discharged and therefore no new objective data collected as she did not attend her recheck appointment.        Rehab Discharge Reason: Patient requested due to not feeling she has made progress.

## 2024-03-11 NOTE — PROGRESS NOTES
"Physical Therapy Treatment    Patient Name: Quyen Rolon  MRN: [id number]  Today's Date: 3/11/2024                       General:       Assessment:       Plan:       Current Problem  Problem List Items Addressed This Visit    None      Subjective     Precautions       Pain       Treatments:  Therapeutic Exercise  Therapeutic Exercise Performed: Yes  Seated stepper 5'   SKTC x 10\" x 5  Figure 4 piriformis stretch 20\" x 3 L  LTR x 10 ea side   Hip flex stretch 3 x 20\"  Hooklying TrA x10 5\"Hold   Hooklying TrA 2 x 10 marching   Hooklying TrA with hip abd 2 x 10 green  Hooklying TrA with hip add 2x 10 3\" Hold   IT band stretch with strap x10 10\"   HS stretch with strap x10 10\"      Manual Therapy  Manual Therapy Performed: Yes   Manual Therapy Performed: Yes   STW to L gluts and IT band         OP EDUCATION:    Access Code: [email or web address]  URL: [email or web address]  Date: 02/28/2024  Prepared by: Karolyn Escamilla     Exercises  - Supine ITB Stretch with Strap  - 1 x daily - 7 x weekly - 1 sets - 10 reps - 10 hold  - Supine Hamstring Stretch with Strap  - 1 x daily - 7 x weekly - 1 sets - 10 reps - 104 hold  Access Code: [email or web address]  URL: [email or web address]  Date: 02/12/2024  Prepared by: Criselda Hutson     Exercises  - Supine Lower Trunk Rotation  - 1 x daily - 7 x weekly - 2 sets - 10 reps  - Supine Figure 4 Piriformis Stretch  - 1 x daily - 7 x weekly - 1 sets - 3 reps - 20 hold  - Hooklying Single Knee to Chest Stretch with Towel  - 1 x daily - 7 x weekly - 1 sets - 10 reps - 10 hold  Access Code: [email or web address]  URL: [email or web address]  Date: 02/23/2024  Prepared by: Karolyn Escamilla     Exercises  - Supine Modified Hip Flexor Stretch at Edge of Bed  - 1 x daily - 7 x weekly - 1 sets - 3 reps - 20-30 hold  - Hooklying Transversus Abdominis Palpation  - 1 x daily - 7 x weekly - 3 sets - 10 reps - 3-5 hold  - Supine March  - 1 x daily - 7 x weekly - 2 sets - " 10 reps  - Supine Hip Adduction Isometric with Ball  - 1 x daily - 7 x weekly - 2 sets - 10 reps - 5-10 hold  - Hooklying Isometric Hip Abduction with Belt  - 1 x daily - 7 x weekly - 2 sets - 10 reps  - Supine Bridge  - 1 x daily - 7 x weekly - 2 sets - 10 reps          Goals:  Active       low back pain       Patient will demonstrate understanding of HEP to augment PT POC and continue with management of low back symptoms after discharge        Start:  02/13/24    Expected End:  03/12/24            Patient will demonstrate improved BLE strength to 4+/5 in her available range to allow for improved ease with walking 10 minutes or longer       Start:  02/13/24    Expected End:  03/12/24            Patient will demonstrate painfree lumbar FB to 75% to allow for improved ease with getting dressed/bathed independently       Start:  02/13/24    Expected End:  03/12/24            Patient will score 40% or less on Modified Oswestry to allow for improved independence within her home       Start:  02/13/24    Expected End:  03/12/24

## 2024-03-15 ENCOUNTER — APPOINTMENT (OUTPATIENT)
Dept: PHYSICAL THERAPY | Facility: CLINIC | Age: 79
End: 2024-03-15
Payer: COMMERCIAL

## 2024-03-18 ENCOUNTER — APPOINTMENT (OUTPATIENT)
Dept: PHYSICAL THERAPY | Facility: CLINIC | Age: 79
End: 2024-03-18
Payer: COMMERCIAL

## 2024-03-25 ENCOUNTER — APPOINTMENT (OUTPATIENT)
Dept: PHYSICAL THERAPY | Facility: CLINIC | Age: 79
End: 2024-03-25
Payer: COMMERCIAL

## 2024-04-09 ENCOUNTER — OFFICE VISIT (OUTPATIENT)
Dept: PRIMARY CARE | Facility: CLINIC | Age: 79
End: 2024-04-09
Payer: COMMERCIAL

## 2024-04-09 VITALS
HEIGHT: 56 IN | HEART RATE: 60 BPM | OXYGEN SATURATION: 97 % | WEIGHT: 157.4 LBS | SYSTOLIC BLOOD PRESSURE: 126 MMHG | DIASTOLIC BLOOD PRESSURE: 76 MMHG | BODY MASS INDEX: 35.41 KG/M2

## 2024-04-09 DIAGNOSIS — I10 HYPERTENSION, UNSPECIFIED TYPE: ICD-10-CM

## 2024-04-09 DIAGNOSIS — E11.9 TYPE 2 DIABETES MELLITUS WITHOUT COMPLICATION, WITHOUT LONG-TERM CURRENT USE OF INSULIN (MULTI): ICD-10-CM

## 2024-04-09 DIAGNOSIS — E11.59 TYPE 2 DIABETES MELLITUS WITH OTHER CIRCULATORY COMPLICATIONS (MULTI): ICD-10-CM

## 2024-04-09 DIAGNOSIS — Z95.5 HISTORY OF CORONARY ARTERY STENT PLACEMENT: Primary | ICD-10-CM

## 2024-04-09 DIAGNOSIS — Z13.220 LIPID SCREENING: ICD-10-CM

## 2024-04-09 DIAGNOSIS — D50.9 IRON DEFICIENCY ANEMIA, UNSPECIFIED IRON DEFICIENCY ANEMIA TYPE: ICD-10-CM

## 2024-04-09 PROCEDURE — 3074F SYST BP LT 130 MM HG: CPT | Performed by: STUDENT IN AN ORGANIZED HEALTH CARE EDUCATION/TRAINING PROGRAM

## 2024-04-09 PROCEDURE — 1159F MED LIST DOCD IN RCRD: CPT | Performed by: STUDENT IN AN ORGANIZED HEALTH CARE EDUCATION/TRAINING PROGRAM

## 2024-04-09 PROCEDURE — 1036F TOBACCO NON-USER: CPT | Performed by: STUDENT IN AN ORGANIZED HEALTH CARE EDUCATION/TRAINING PROGRAM

## 2024-04-09 PROCEDURE — 3078F DIAST BP <80 MM HG: CPT | Performed by: STUDENT IN AN ORGANIZED HEALTH CARE EDUCATION/TRAINING PROGRAM

## 2024-04-09 PROCEDURE — 1160F RVW MEDS BY RX/DR IN RCRD: CPT | Performed by: STUDENT IN AN ORGANIZED HEALTH CARE EDUCATION/TRAINING PROGRAM

## 2024-04-09 PROCEDURE — 99214 OFFICE O/P EST MOD 30 MIN: CPT | Performed by: STUDENT IN AN ORGANIZED HEALTH CARE EDUCATION/TRAINING PROGRAM

## 2024-04-09 RX ORDER — NITROGLYCERIN 0.4 MG/1
0.4 TABLET SUBLINGUAL EVERY 5 MIN PRN
Qty: 15 TABLET | Refills: 3 | Status: SHIPPED | OUTPATIENT
Start: 2024-04-09

## 2024-04-09 RX ORDER — METFORMIN HYDROCHLORIDE 1000 MG/1
1000 TABLET, FILM COATED, EXTENDED RELEASE ORAL
Qty: 180 TABLET | Refills: 3 | Status: SHIPPED | OUTPATIENT
Start: 2024-04-09 | End: 2024-05-10

## 2024-04-09 RX ORDER — METOPROLOL SUCCINATE 50 MG/1
25 TABLET, EXTENDED RELEASE ORAL 2 TIMES DAILY
Qty: 90 TABLET | Refills: 3 | Status: SHIPPED | OUTPATIENT
Start: 2024-04-09 | End: 2024-04-09 | Stop reason: SDUPTHER

## 2024-04-09 RX ORDER — AMLODIPINE BESYLATE 2.5 MG/1
2.5 TABLET ORAL
Qty: 90 TABLET | Refills: 3
Start: 2024-04-09 | End: 2024-04-09 | Stop reason: SDUPTHER

## 2024-04-09 RX ORDER — NITROGLYCERIN 0.4 MG/1
0.4 TABLET SUBLINGUAL EVERY 5 MIN PRN
Qty: 15 TABLET | Refills: 3 | Status: SHIPPED | OUTPATIENT
Start: 2024-04-09 | End: 2024-04-09 | Stop reason: SDUPTHER

## 2024-04-09 RX ORDER — METFORMIN HYDROCHLORIDE 1000 MG/1
1000 TABLET, FILM COATED, EXTENDED RELEASE ORAL
Qty: 180 TABLET | Refills: 3 | Status: SHIPPED | OUTPATIENT
Start: 2024-04-09 | End: 2024-04-09 | Stop reason: SDUPTHER

## 2024-04-09 RX ORDER — AMLODIPINE BESYLATE 2.5 MG/1
2.5 TABLET ORAL
Qty: 90 TABLET | Refills: 3 | Status: SHIPPED | OUTPATIENT
Start: 2024-04-09 | End: 2024-05-28 | Stop reason: ALTCHOICE

## 2024-04-09 RX ORDER — METOPROLOL SUCCINATE 50 MG/1
25 TABLET, EXTENDED RELEASE ORAL 2 TIMES DAILY
Qty: 90 TABLET | Refills: 3 | Status: SHIPPED | OUTPATIENT
Start: 2024-04-09

## 2024-04-09 ASSESSMENT — PATIENT HEALTH QUESTIONNAIRE - PHQ9
1. LITTLE INTEREST OR PLEASURE IN DOING THINGS: NOT AT ALL
SUM OF ALL RESPONSES TO PHQ9 QUESTIONS 1 AND 2: 0
2. FEELING DOWN, DEPRESSED OR HOPELESS: NOT AT ALL

## 2024-04-09 NOTE — PROGRESS NOTES
Subjective:  Quyen Rolon is a 78 y.o. female who presents to clinic today for Follow-up (3 MO FU )      She notes that when she was in physical therapy her sciatic pain returned  She notes that her s gets worse at night and her requip is helpful  She falls asleep relatively easily and then sleeps the rest of the night well    Her vaginal irritation has resolved with the ketoconazole.     She notes that she does not have chest pain or shortness of breath and has not taken her nitroglycerin in almost 2 years.  Additionally she continues to take her metoprolol and metformin.    Overall she feels that she is doing quite well although still experiences incontinence.      Review of Systems    Assessment/Plan:  Quyen Rolon is a 78 y.o. female with a history of hypertension, CAD, uterine cancer  who presents to clinic today to address the following issues:   1. History of coronary artery stent placement  Lipid panel    metoprolol succinate XL (Toprol-XL) 50 mg 24 hr tablet    nitroglycerin (Nitrostat) 0.4 mg SL tablet    DISCONTINUED: nitroglycerin (Nitrostat) 0.4 mg SL tablet    DISCONTINUED: metoprolol succinate XL (Toprol-XL) 50 mg 24 hr tablet      2. Hypertension, unspecified type  amLODIPine (Norvasc) 2.5 mg tablet    DISCONTINUED: amLODIPine (Norvasc) 2.5 mg tablet      3. Type 2 diabetes mellitus without complication, without long-term current use of insulin (CMS/[facility])  Albumin , Urine Random    Hemoglobin A1c    Comprehensive metabolic panel    metFORMIN, MOD, (Glumetza) 1,000 mg 24 hr tablet    DISCONTINUED: metFORMIN XR (Glumetza) 1,000 mg 24 hr tablet      4. Iron deficiency anemia, unspecified iron deficiency anemia type  CBC and Auto Differential    Ferritin      5. Lipid screening  Lipid panel      6. Type 2 diabetes mellitus with other circulatory complications (CMS/[facility])  Lipid panel          Problem List Items Addressed This Visit       Type 2 diabetes mellitus without  "complication, without long-term current use of insulin (CMS/[facility])    Overview     Hgb A1c 6.6 in June 2023.         Current Assessment & Plan     Will recheck A1c as well as urine microalbumin in 6 months.  Continue metformin 2000 mg daily.         Relevant Medications    metFORMIN, MOD, (Glumetza) 1,000 mg 24 hr tablet    Other Relevant Orders    Albumin , Urine Random    Hemoglobin A1c    Comprehensive metabolic panel    History of coronary artery stent placement - Primary    Current Assessment & Plan     Continue nitroglycerin as needed although patient is not taking it as well as aspirin and statin         Relevant Medications    metoprolol succinate XL (Toprol-XL) 50 mg 24 hr tablet    nitroglycerin (Nitrostat) 0.4 mg SL tablet    Other Relevant Orders    Lipid panel    Hypertension    Overview     Blood pressure goal less than or equal to 140/90.         Current Assessment & Plan     Continue losartan as well as amlodipine         Relevant Medications    amLODIPine (Norvasc) 2.5 mg tablet     Other Visit Diagnoses       Iron deficiency anemia, unspecified iron deficiency anemia type        Relevant Orders    CBC and Auto Differential    Ferritin    Lipid screening        Relevant Orders    Lipid panel    Type 2 diabetes mellitus with other circulatory complications (CMS/[facility])        Relevant Orders    Lipid panel            There are no Patient Instructions on file for this visit.    Follow up: 6 months    Return precautions discussed.  An After Visit Summary was given to the patient.  All questions were answered and patient in agreement with plan.    Objective:  /76   Pulse 60   Ht 1.422 m (4' 8\")   Wt 71.4 kg (157 lb 6.4 oz)   SpO2 97%   BMI 35.29 kg/m²     Physical Exam  Vitals and nursing note reviewed.   Constitutional:       General: She is not in acute distress.     Appearance: Normal appearance.   HENT:      Head: Normocephalic and atraumatic.      Mouth/Throat:      Mouth: Mucous membranes " are moist.   Eyes:      General: No scleral icterus.        Right eye: No discharge.         Left eye: No discharge.      Extraocular Movements: Extraocular movements intact.      Conjunctiva/sclera: Conjunctivae normal.   Cardiovascular:      Rate and Rhythm: Normal rate. Rhythm irregular.      Heart sounds: No murmur heard.  Pulmonary:      Effort: Pulmonary effort is normal. No respiratory distress.      Breath sounds: No wheezing.   Musculoskeletal:      Right lower leg: No edema.      Left lower leg: No edema.   Skin:     General: Skin is warm and dry.   Neurological:      General: No focal deficit present.      Mental Status: She is alert and oriented to person, place, and time.   Psychiatric:         Attention and Perception: Attention normal.         Mood and Affect: Mood normal.         Speech: Speech normal.         Behavior: Behavior normal.         Cognition and Memory: Cognition and memory normal.         Judgment: Judgment normal.         I spent 26 minutes in total time for this visit including all related clinical activities before, during, and after the visit excluding other billable activities/procedure time.     Grecia Jarvis MD

## 2024-04-18 DIAGNOSIS — Z95.5 HISTORY OF CORONARY ARTERY STENT PLACEMENT: ICD-10-CM

## 2024-04-18 DIAGNOSIS — I10 HYPERTENSION, UNSPECIFIED TYPE: ICD-10-CM

## 2024-04-18 DIAGNOSIS — E11.9 TYPE 2 DIABETES MELLITUS WITHOUT COMPLICATION, WITHOUT LONG-TERM CURRENT USE OF INSULIN (MULTI): Primary | ICD-10-CM

## 2024-04-18 RX ORDER — ATORVASTATIN CALCIUM 80 MG/1
80 TABLET, FILM COATED ORAL DAILY
Qty: 90 TABLET | Refills: 3 | Status: SHIPPED | OUTPATIENT
Start: 2024-04-18 | End: 2024-05-28 | Stop reason: SDUPTHER

## 2024-05-10 ENCOUNTER — TELEPHONE (OUTPATIENT)
Dept: PRIMARY CARE | Facility: CLINIC | Age: 79
End: 2024-05-10
Payer: COMMERCIAL

## 2024-05-10 DIAGNOSIS — E11.9 TYPE 2 DIABETES MELLITUS WITHOUT COMPLICATION, WITHOUT LONG-TERM CURRENT USE OF INSULIN (MULTI): ICD-10-CM

## 2024-05-10 RX ORDER — METFORMIN HYDROCHLORIDE 500 MG/1
1000 TABLET, EXTENDED RELEASE ORAL
Qty: 360 TABLET | Refills: 3 | Status: SHIPPED | OUTPATIENT
Start: 2024-05-10 | End: 2024-06-10 | Stop reason: SDUPTHER

## 2024-05-10 NOTE — TELEPHONE ENCOUNTER
FYI: PATIENT CALLED TO REPORT HER PRESCRIPTION INSURANCE WILL NOT COVER METFORMIN AT 1,000 MG. THEY WILL COVER  MG DOSE. IT SOUNDS LIKE THE PHARMACY SENT HER ENOUGH MEDICATION TO TAKE 500 MG BID.

## 2024-05-28 ENCOUNTER — TELEPHONE (OUTPATIENT)
Dept: PRIMARY CARE | Facility: CLINIC | Age: 79
End: 2024-05-28

## 2024-05-28 ENCOUNTER — OFFICE VISIT (OUTPATIENT)
Dept: PRIMARY CARE | Facility: CLINIC | Age: 79
End: 2024-05-28
Payer: COMMERCIAL

## 2024-05-28 VITALS
BODY MASS INDEX: 35.3 KG/M2 | SYSTOLIC BLOOD PRESSURE: 116 MMHG | HEIGHT: 56 IN | DIASTOLIC BLOOD PRESSURE: 76 MMHG | OXYGEN SATURATION: 98 % | HEART RATE: 83 BPM | WEIGHT: 156.9 LBS

## 2024-05-28 DIAGNOSIS — I10 HYPERTENSION, UNSPECIFIED TYPE: ICD-10-CM

## 2024-05-28 DIAGNOSIS — R09.82 POSTNASAL DRIP: Primary | ICD-10-CM

## 2024-05-28 DIAGNOSIS — Z95.5 HISTORY OF CORONARY ARTERY STENT PLACEMENT: ICD-10-CM

## 2024-05-28 DIAGNOSIS — N81.10 BLADDER PROLAPSE, FEMALE, ACQUIRED: ICD-10-CM

## 2024-05-28 DIAGNOSIS — R35.0 URINE FREQUENCY: ICD-10-CM

## 2024-05-28 DIAGNOSIS — G25.81 RESTLESS LEG SYNDROME: ICD-10-CM

## 2024-05-28 DIAGNOSIS — E11.9 TYPE 2 DIABETES MELLITUS WITHOUT COMPLICATION, WITHOUT LONG-TERM CURRENT USE OF INSULIN (MULTI): ICD-10-CM

## 2024-05-28 DIAGNOSIS — B34.9 VIRAL SYNDROME: ICD-10-CM

## 2024-05-28 LAB
POC APPEARANCE, URINE: CLEAR
POC BILIRUBIN, URINE: NEGATIVE
POC BLOOD, URINE: NEGATIVE
POC COLOR, URINE: YELLOW
POC GLUCOSE, URINE: NEGATIVE MG/DL
POC KETONES, URINE: NEGATIVE MG/DL
POC LEUKOCYTES, URINE: NEGATIVE
POC NITRITE,URINE: NEGATIVE
POC PH, URINE: 7.5 PH
POC PROTEIN, URINE: NEGATIVE MG/DL
POC SPECIFIC GRAVITY, URINE: 1.02
POC UROBILINOGEN, URINE: 0.2 EU/DL

## 2024-05-28 PROCEDURE — 1160F RVW MEDS BY RX/DR IN RCRD: CPT | Performed by: STUDENT IN AN ORGANIZED HEALTH CARE EDUCATION/TRAINING PROGRAM

## 2024-05-28 PROCEDURE — 1036F TOBACCO NON-USER: CPT | Performed by: STUDENT IN AN ORGANIZED HEALTH CARE EDUCATION/TRAINING PROGRAM

## 2024-05-28 PROCEDURE — 81003 URINALYSIS AUTO W/O SCOPE: CPT | Performed by: STUDENT IN AN ORGANIZED HEALTH CARE EDUCATION/TRAINING PROGRAM

## 2024-05-28 PROCEDURE — 99214 OFFICE O/P EST MOD 30 MIN: CPT | Performed by: STUDENT IN AN ORGANIZED HEALTH CARE EDUCATION/TRAINING PROGRAM

## 2024-05-28 PROCEDURE — 3078F DIAST BP <80 MM HG: CPT | Performed by: STUDENT IN AN ORGANIZED HEALTH CARE EDUCATION/TRAINING PROGRAM

## 2024-05-28 PROCEDURE — 3074F SYST BP LT 130 MM HG: CPT | Performed by: STUDENT IN AN ORGANIZED HEALTH CARE EDUCATION/TRAINING PROGRAM

## 2024-05-28 PROCEDURE — 1159F MED LIST DOCD IN RCRD: CPT | Performed by: STUDENT IN AN ORGANIZED HEALTH CARE EDUCATION/TRAINING PROGRAM

## 2024-05-28 RX ORDER — ROPINIROLE 1 MG/1
1 TABLET, FILM COATED ORAL
Qty: 90 TABLET | Refills: 3 | Status: SHIPPED | OUTPATIENT
Start: 2024-05-28

## 2024-05-28 RX ORDER — ATORVASTATIN CALCIUM 80 MG/1
80 TABLET, FILM COATED ORAL DAILY
Qty: 90 TABLET | Refills: 3 | Status: SHIPPED | OUTPATIENT
Start: 2024-05-28 | End: 2024-05-28

## 2024-05-28 RX ORDER — FLUTICASONE PROPIONATE 50 MCG
1 SPRAY, SUSPENSION (ML) NASAL DAILY
Qty: 16 G | Refills: 11 | Status: SHIPPED | OUTPATIENT
Start: 2024-05-28 | End: 2025-05-28

## 2024-05-28 RX ORDER — ATORVASTATIN CALCIUM 80 MG/1
80 TABLET, FILM COATED ORAL DAILY
Qty: 90 TABLET | Refills: 3 | Status: CANCELLED | OUTPATIENT
Start: 2024-05-28 | End: 2025-05-28

## 2024-05-28 RX ORDER — ROPINIROLE 1 MG/1
1 TABLET, FILM COATED ORAL
Qty: 90 TABLET | Refills: 3 | Status: CANCELLED | OUTPATIENT
Start: 2024-05-28 | End: 2025-05-28

## 2024-05-28 RX ORDER — LOSARTAN POTASSIUM 100 MG/1
100 TABLET ORAL DAILY
Qty: 90 TABLET | Refills: 3 | Status: CANCELLED | OUTPATIENT
Start: 2024-05-28 | End: 2025-05-28

## 2024-05-28 RX ORDER — ROPINIROLE 1 MG/1
1 TABLET, FILM COATED ORAL
Qty: 90 TABLET | Refills: 3 | Status: SHIPPED | OUTPATIENT
Start: 2024-05-28 | End: 2024-05-28

## 2024-05-28 RX ORDER — ATORVASTATIN CALCIUM 80 MG/1
80 TABLET, FILM COATED ORAL DAILY
Qty: 90 TABLET | Refills: 3 | Status: SHIPPED | OUTPATIENT
Start: 2024-05-28 | End: 2025-05-28

## 2024-05-28 RX ORDER — LOSARTAN POTASSIUM 100 MG/1
100 TABLET ORAL DAILY
Qty: 90 TABLET | Refills: 3 | Status: SHIPPED | OUTPATIENT
Start: 2024-05-28

## 2024-05-28 RX ORDER — LOSARTAN POTASSIUM 100 MG/1
100 TABLET ORAL DAILY
Qty: 90 TABLET | Refills: 3 | Status: SHIPPED | OUTPATIENT
Start: 2024-05-28 | End: 2024-05-28

## 2024-05-28 NOTE — PATIENT INSTRUCTIONS
Sinuses:    Every day twice daily    1) 2 puffs Fluticasone in each nostril    After two weeks, reduce that Flonase to 2 puffs once daily     If not improving in 4-5 days or if you are worsening please give us a call at [phone].

## 2024-05-28 NOTE — PROGRESS NOTES
Subjective:  Quyen Rolon is a 78 y.o. female who presents to clinic today for sinus drainage (Started Sunday), Urinary Frequency (Burning, no low back pain, cramps now and then), and Cough (Chest pain when cough)      She notes that she has been waking up every morning coughing and spitting stuff out for weeks.   She notes that 2 days ago she felt unwell.  She notes that she is getting worse.   She also has a sore in her mouth and a sore throat last last 2 days.    She has had vaginal burning.  She continues to use ketoconazole cream but she does not think its been helpful anymore.  She notes that she has had something protruding when she goes to the bathroom.         Review of Systems    Assessment/Plan:  Quyen Rolon is a 78 y.o. female with a history of  hypertension, CAD, uterine cancer  who presents to clinic today to address the following issues:   1. Postnasal drip  fluticasone (Flonase) 50 mcg/actuation nasal spray      2. Viral syndrome        3. Urine frequency  POCT UA Automated manually resulted      4. Bladder prolapse, female, acquired        5. Type 2 diabetes mellitus without complication, without long-term current use of insulin (Multi)  atorvastatin (Lipitor) 80 mg tablet    DISCONTINUED: atorvastatin (Lipitor) 80 mg tablet      6. Hypertension, unspecified type  atorvastatin (Lipitor) 80 mg tablet    losartan (Cozaar) 100 mg tablet    DISCONTINUED: atorvastatin (Lipitor) 80 mg tablet    DISCONTINUED: losartan (Cozaar) 100 mg tablet      7. History of coronary artery stent placement  atorvastatin (Lipitor) 80 mg tablet    DISCONTINUED: atorvastatin (Lipitor) 80 mg tablet      8. Restless leg syndrome  rOPINIRole (Requip) 1 mg tablet    DISCONTINUED: rOPINIRole (Requip) 1 mg tablet      Postnasal Drip/Viral Syndrome:  - Acute problem, unresolved, new to this provider, requires further workup and treatment   - Discussed how nasal anatomy works and how inflamed nasal  turbinates can lead to postnasal drip and coughing  - discussed role of inhaled nasal steroids and how to take them, information was given in after visit summary  - discussed how she likely has a superimposed viral illness with her increasing symptoms and recommended supportive care, if not improving can consider antibiotics in several days    Vaginal Burning:  Chronic, known to provider  - examination revealed prolapsed bladder and I recommended follow up with her OB/GYN team  - UA within normal limits    Refills of her medications provided. Stopped amlodipine due to BP being well below goal and her having some peripheral edema.  Problem List Items Addressed This Visit       Type 2 diabetes mellitus without complication, without long-term current use of insulin (Multi)    Overview     Hgb A1c 6.6 in June 2023.         Relevant Medications    atorvastatin (Lipitor) 80 mg tablet    Bladder prolapse, female, acquired    History of coronary artery stent placement    Relevant Medications    atorvastatin (Lipitor) 80 mg tablet    Hypertension    Overview     Blood pressure goal less than or equal to 140/90.         Relevant Medications    atorvastatin (Lipitor) 80 mg tablet    losartan (Cozaar) 100 mg tablet     Other Visit Diagnoses       Postnasal drip    -  Primary    Relevant Medications    fluticasone (Flonase) 50 mcg/actuation nasal spray    Viral syndrome        Urine frequency        Relevant Orders    POCT UA Automated manually resulted (Completed)    Restless leg syndrome        Relevant Medications    rOPINIRole (Requip) 1 mg tablet            Patient Instructions   Sinuses:    Every day twice daily    1) 2 puffs Fluticasone in each nostril    After two weeks, reduce that Flonase to 2 puffs once daily     If not improving in 4-5 days or if you are worsening please give us a call at [phone].    Follow up: as needed    Return precautions discussed.  An After Visit Summary was given to the patient.  All  "questions were answered and patient in agreement with plan.    Objective:  /76   Pulse 83   Ht 1.422 m (4' 8\")   Wt 71.2 kg (156 lb 14.4 oz)   SpO2 98%   BMI 35.18 kg/m²     Physical Exam  Vitals and nursing note reviewed. Exam conducted with a chaperone present.   Constitutional:       General: She is not in acute distress.     Appearance: Normal appearance.   HENT:      Head: Normocephalic and atraumatic.      Right Ear: Tympanic membrane and ear canal normal.      Left Ear: Tympanic membrane and ear canal normal.      Nose: Congestion present.      Mouth/Throat:      Mouth: Mucous membranes are moist.      Pharynx: Posterior oropharyngeal erythema present.      Comments: Apthous ulcer inside lower lip  Eyes:      General: No scleral icterus.        Right eye: No discharge.         Left eye: No discharge.      Extraocular Movements: Extraocular movements intact.      Conjunctiva/sclera: Conjunctivae normal.   Cardiovascular:      Rate and Rhythm: Normal rate and regular rhythm.   Pulmonary:      Effort: Pulmonary effort is normal. No respiratory distress.      Breath sounds: Normal breath sounds.   Genitourinary:     Urethra: Prolapse present.   Skin:     General: Skin is warm and dry.   Neurological:      General: No focal deficit present.      Mental Status: She is alert and oriented to person, place, and time.   Psychiatric:         Attention and Perception: Attention normal.         Mood and Affect: Mood normal.         Speech: Speech normal.         Behavior: Behavior normal.         Cognition and Memory: Cognition and memory normal.         Judgment: Judgment normal.         I spent 26 minutes in total time for this visit including all related clinical activities before, during, and after the visit excluding other billable activities/procedure time.     Grecia Jarvis MD    "

## 2024-06-03 ENCOUNTER — OFFICE VISIT (OUTPATIENT)
Dept: PRIMARY CARE | Facility: CLINIC | Age: 79
End: 2024-06-03
Payer: COMMERCIAL

## 2024-06-03 VITALS
BODY MASS INDEX: 35.52 KG/M2 | HEART RATE: 68 BPM | SYSTOLIC BLOOD PRESSURE: 130 MMHG | HEIGHT: 56 IN | DIASTOLIC BLOOD PRESSURE: 74 MMHG | OXYGEN SATURATION: 97 % | WEIGHT: 157.9 LBS

## 2024-06-03 DIAGNOSIS — I10 HYPERTENSION, UNSPECIFIED TYPE: ICD-10-CM

## 2024-06-03 DIAGNOSIS — Z09 HOSPITAL DISCHARGE FOLLOW-UP: Primary | ICD-10-CM

## 2024-06-03 DIAGNOSIS — I48.91 ATRIAL FIBRILLATION, UNSPECIFIED TYPE (MULTI): ICD-10-CM

## 2024-06-03 DIAGNOSIS — R07.89 ATYPICAL CHEST PAIN: ICD-10-CM

## 2024-06-03 PROCEDURE — 1159F MED LIST DOCD IN RCRD: CPT | Performed by: STUDENT IN AN ORGANIZED HEALTH CARE EDUCATION/TRAINING PROGRAM

## 2024-06-03 PROCEDURE — 1036F TOBACCO NON-USER: CPT | Performed by: STUDENT IN AN ORGANIZED HEALTH CARE EDUCATION/TRAINING PROGRAM

## 2024-06-03 PROCEDURE — 3075F SYST BP GE 130 - 139MM HG: CPT | Performed by: STUDENT IN AN ORGANIZED HEALTH CARE EDUCATION/TRAINING PROGRAM

## 2024-06-03 PROCEDURE — G2211 COMPLEX E/M VISIT ADD ON: HCPCS | Performed by: STUDENT IN AN ORGANIZED HEALTH CARE EDUCATION/TRAINING PROGRAM

## 2024-06-03 PROCEDURE — 99214 OFFICE O/P EST MOD 30 MIN: CPT | Performed by: STUDENT IN AN ORGANIZED HEALTH CARE EDUCATION/TRAINING PROGRAM

## 2024-06-03 PROCEDURE — 3078F DIAST BP <80 MM HG: CPT | Performed by: STUDENT IN AN ORGANIZED HEALTH CARE EDUCATION/TRAINING PROGRAM

## 2024-06-03 RX ORDER — NAPROXEN 500 MG/1
500 TABLET ORAL
COMMUNITY
Start: 2024-05-31

## 2024-06-03 RX ORDER — AMLODIPINE BESYLATE 2.5 MG/1
2.5 TABLET ORAL
Qty: 90 TABLET | Refills: 3 | Status: SHIPPED | OUTPATIENT
Start: 2024-06-03 | End: 2025-06-03

## 2024-06-03 NOTE — PROGRESS NOTES
Subjective:  Quyen Rolon is a 78 y.o. female who presents to clinic today for ER FU       She was admitted to [facility] for 1 day due to atypical chest pain last week.  She was kept for 3 nights and they did a full cardiac workup but no cause was found.    She does have a cardiologist and she was taken off of her blood thinner. She has had some falls recently. She is also getting a carotid US in November.    She is concerned that her pain is still present. She is able to sleep at night. Naproxen is helpful during the day. She is wondering if her rollater is affecting her pain.    Review of Systems    Assessment/Plan:  Quyen Rolon is a 78 y.o. female with a history of hypertension, CAD, uterine cancer  who presents to clinic today to address the following issues:   1. Hospital discharge follow-up        2. Atypical chest pain        3. Atrial fibrillation, unspecified type (Multi)        4. Hypertension, unspecified type  amLODIPine (Norvasc) 2.5 mg tablet        We reviewed her documentation from hospitalization including labs and imaging. She was noted to have atrial fibrillation in the hospital which she appeared to be in here in the office as well today. We discussed that she has a very high [id number] score and I recommended that she start anticoagulation. She declined at thist time and voiced understanding of her risk of stroke, >10% per year.    Problem List Items Addressed This Visit       Hypertension    Overview     Blood pressure goal less than or equal to 140/90.         Current Assessment & Plan     - restarted amlodipine 2.5mg daily at hospital and she would like to continue         Relevant Medications    amLODIPine (Norvasc) 2.5 mg tablet     Other Visit Diagnoses       Hospital discharge follow-up    -  Primary    Atypical chest pain        Atrial fibrillation, unspecified type (Multi)        Relevant Medications    amLODIPine (Norvasc) 2.5 mg tablet       "      There are no Patient Instructions on file for this visit.    Follow up: October or sooner as needed    Return precautions discussed.  An After Visit Summary was given to the patient.  All questions were answered and patient in agreement with plan.    Objective:  /74   Pulse 68   Ht 1.422 m (4' 8\")   Wt 71.6 kg (157 lb 14.4 oz)   SpO2 97%   BMI 35.40 kg/m²     Physical Exam  Vitals and nursing note reviewed.   Constitutional:       General: She is not in acute distress.     Appearance: Normal appearance.   HENT:      Head: Normocephalic and atraumatic.      Mouth/Throat:      Mouth: Mucous membranes are moist.   Eyes:      General: No scleral icterus.        Right eye: No discharge.         Left eye: No discharge.      Extraocular Movements: Extraocular movements intact.      Conjunctiva/sclera: Conjunctivae normal.   Cardiovascular:      Rate and Rhythm: Normal rate. Rhythm irregular.   Pulmonary:      Effort: No respiratory distress.   Skin:     General: Skin is warm and dry.      Comments: I saw no skin findings over the region where she is having pain on her left chest   Neurological:      General: No focal deficit present.      Mental Status: She is alert and oriented to person, place, and time.   Psychiatric:         Attention and Perception: Attention normal.         Mood and Affect: Mood normal.         Speech: Speech normal.         Behavior: Behavior normal.         Cognition and Memory: Cognition and memory normal.         Judgment: Judgment normal.         I spent 27 minutes in total time for this visit including all related clinical activities before, during, and after the visit excluding other billable activities/procedure time.     Grecia Jarvis MD    "

## 2024-06-10 DIAGNOSIS — E11.9 TYPE 2 DIABETES MELLITUS WITHOUT COMPLICATION, WITHOUT LONG-TERM CURRENT USE OF INSULIN (MULTI): ICD-10-CM

## 2024-06-10 DIAGNOSIS — I10 HYPERTENSION, UNSPECIFIED TYPE: ICD-10-CM

## 2024-06-10 RX ORDER — METFORMIN HYDROCHLORIDE 500 MG/1
1000 TABLET, EXTENDED RELEASE ORAL
Qty: 360 TABLET | Refills: 3 | Status: SHIPPED | OUTPATIENT
Start: 2024-06-10

## 2024-08-19 ENCOUNTER — HOSPITAL ENCOUNTER (INPATIENT)
Facility: HOSPITAL | Age: 79
LOS: 2 days | Discharge: HOME | DRG: 558 | End: 2024-08-21
Attending: EMERGENCY MEDICINE | Admitting: INTERNAL MEDICINE
Payer: COMMERCIAL

## 2024-08-19 ENCOUNTER — APPOINTMENT (OUTPATIENT)
Dept: RADIOLOGY | Facility: HOSPITAL | Age: 79
DRG: 558 | End: 2024-08-19
Payer: COMMERCIAL

## 2024-08-19 DIAGNOSIS — M71.9 BURSITIS, UNSPECIFIED SITE: ICD-10-CM

## 2024-08-19 DIAGNOSIS — L02.91 ABSCESS: Primary | ICD-10-CM

## 2024-08-19 LAB
ALBUMIN SERPL BCP-MCNC: 4.1 G/DL (ref 3.4–5)
ALP SERPL-CCNC: 52 U/L (ref 33–136)
ALT SERPL W P-5'-P-CCNC: 21 U/L (ref 7–45)
ANION GAP SERPL CALC-SCNC: 10 MMOL/L (ref 10–20)
APPEARANCE UR: CLEAR
AST SERPL W P-5'-P-CCNC: 19 U/L (ref 9–39)
BACTERIA #/AREA URNS AUTO: ABNORMAL /HPF
BASOPHILS # BLD AUTO: 0.05 X10*3/UL (ref 0–0.1)
BASOPHILS NFR BLD AUTO: 0.4 %
BILIRUB SERPL-MCNC: 0.2 MG/DL (ref 0–1.2)
BILIRUB UR STRIP.AUTO-MCNC: NEGATIVE MG/DL
BUN SERPL-MCNC: 33 MG/DL (ref 6–23)
CALCIUM SERPL-MCNC: 9.4 MG/DL (ref 8.6–10.3)
CHLORIDE SERPL-SCNC: 106 MMOL/L (ref 98–107)
CO2 SERPL-SCNC: 27 MMOL/L (ref 21–32)
COLOR UR: ABNORMAL
CREAT SERPL-MCNC: 1.12 MG/DL (ref 0.5–1.05)
EGFRCR SERPLBLD CKD-EPI 2021: 50 ML/MIN/1.73M*2
EOSINOPHIL # BLD AUTO: 0.49 X10*3/UL (ref 0–0.4)
EOSINOPHIL NFR BLD AUTO: 3.9 %
ERYTHROCYTE [DISTWIDTH] IN BLOOD BY AUTOMATED COUNT: 13.2 % (ref 11.5–14.5)
GLUCOSE SERPL-MCNC: 127 MG/DL (ref 74–99)
GLUCOSE UR STRIP.AUTO-MCNC: NORMAL MG/DL
HCT VFR BLD AUTO: 35.7 % (ref 36–46)
HGB BLD-MCNC: 11.5 G/DL (ref 12–16)
IMM GRANULOCYTES # BLD AUTO: 0.03 X10*3/UL (ref 0–0.5)
IMM GRANULOCYTES NFR BLD AUTO: 0.2 % (ref 0–0.9)
KETONES UR STRIP.AUTO-MCNC: NEGATIVE MG/DL
LACTATE SERPL-SCNC: 1.1 MMOL/L (ref 0.4–2)
LEUKOCYTE ESTERASE UR QL STRIP.AUTO: ABNORMAL
LIPASE SERPL-CCNC: 158 U/L (ref 9–82)
LYMPHOCYTES # BLD AUTO: 4.95 X10*3/UL (ref 0.8–3)
LYMPHOCYTES NFR BLD AUTO: 39.6 %
MCH RBC QN AUTO: 33.3 PG (ref 26–34)
MCHC RBC AUTO-ENTMCNC: 32.2 G/DL (ref 32–36)
MCV RBC AUTO: 104 FL (ref 80–100)
MONOCYTES # BLD AUTO: 1.16 X10*3/UL (ref 0.05–0.8)
MONOCYTES NFR BLD AUTO: 9.3 %
MUCOUS THREADS #/AREA URNS AUTO: ABNORMAL /LPF
NEUTROPHILS # BLD AUTO: 5.82 X10*3/UL (ref 1.6–5.5)
NEUTROPHILS NFR BLD AUTO: 46.6 %
NITRITE UR QL STRIP.AUTO: NEGATIVE
NRBC BLD-RTO: 0 /100 WBCS (ref 0–0)
PH UR STRIP.AUTO: 7 [PH]
PLATELET # BLD AUTO: 291 X10*3/UL (ref 150–450)
POTASSIUM SERPL-SCNC: 5.5 MMOL/L (ref 3.5–5.3)
PROT SERPL-MCNC: 6.5 G/DL (ref 6.4–8.2)
PROT UR STRIP.AUTO-MCNC: ABNORMAL MG/DL
RBC # BLD AUTO: 3.45 X10*6/UL (ref 4–5.2)
RBC # UR STRIP.AUTO: NEGATIVE /UL
RBC #/AREA URNS AUTO: ABNORMAL /HPF
SODIUM SERPL-SCNC: 137 MMOL/L (ref 136–145)
SP GR UR STRIP.AUTO: 1.03
SQUAMOUS #/AREA URNS AUTO: ABNORMAL /HPF
UROBILINOGEN UR STRIP.AUTO-MCNC: NORMAL MG/DL
WBC # BLD AUTO: 12.5 X10*3/UL (ref 4.4–11.3)
WBC #/AREA URNS AUTO: ABNORMAL /HPF

## 2024-08-19 PROCEDURE — 80053 COMPREHEN METABOLIC PANEL: CPT | Performed by: EMERGENCY MEDICINE

## 2024-08-19 PROCEDURE — 96372 THER/PROPH/DIAG INJ SC/IM: CPT

## 2024-08-19 PROCEDURE — 83605 ASSAY OF LACTIC ACID: CPT | Performed by: EMERGENCY MEDICINE

## 2024-08-19 PROCEDURE — 2500000005 HC RX 250 GENERAL PHARMACY W/O HCPCS: Performed by: EMERGENCY MEDICINE

## 2024-08-19 PROCEDURE — 2500000001 HC RX 250 WO HCPCS SELF ADMINISTERED DRUGS (ALT 637 FOR MEDICARE OP)

## 2024-08-19 PROCEDURE — 81001 URINALYSIS AUTO W/SCOPE: CPT | Performed by: EMERGENCY MEDICINE

## 2024-08-19 PROCEDURE — 96372 THER/PROPH/DIAG INJ SC/IM: CPT | Performed by: EMERGENCY MEDICINE

## 2024-08-19 PROCEDURE — 87086 URINE CULTURE/COLONY COUNT: CPT | Mod: SAMLAB | Performed by: EMERGENCY MEDICINE

## 2024-08-19 PROCEDURE — 85025 COMPLETE CBC W/AUTO DIFF WBC: CPT | Performed by: EMERGENCY MEDICINE

## 2024-08-19 PROCEDURE — 1100000001 HC PRIVATE ROOM DAILY

## 2024-08-19 PROCEDURE — 99285 EMERGENCY DEPT VISIT HI MDM: CPT

## 2024-08-19 PROCEDURE — 74177 CT ABD & PELVIS W/CONTRAST: CPT

## 2024-08-19 PROCEDURE — 73502 X-RAY EXAM HIP UNI 2-3 VIEWS: CPT | Mod: RT

## 2024-08-19 PROCEDURE — 2550000001 HC RX 255 CONTRASTS: Performed by: EMERGENCY MEDICINE

## 2024-08-19 PROCEDURE — 36415 COLL VENOUS BLD VENIPUNCTURE: CPT | Performed by: EMERGENCY MEDICINE

## 2024-08-19 PROCEDURE — 96375 TX/PRO/DX INJ NEW DRUG ADDON: CPT

## 2024-08-19 PROCEDURE — 74177 CT ABD & PELVIS W/CONTRAST: CPT | Mod: FOREIGN READ | Performed by: RADIOLOGY

## 2024-08-19 PROCEDURE — 2500000004 HC RX 250 GENERAL PHARMACY W/ HCPCS (ALT 636 FOR OP/ED): Performed by: EMERGENCY MEDICINE

## 2024-08-19 PROCEDURE — 2500000004 HC RX 250 GENERAL PHARMACY W/ HCPCS (ALT 636 FOR OP/ED)

## 2024-08-19 PROCEDURE — 99223 1ST HOSP IP/OBS HIGH 75: CPT | Performed by: INTERNAL MEDICINE

## 2024-08-19 PROCEDURE — 96365 THER/PROPH/DIAG IV INF INIT: CPT

## 2024-08-19 PROCEDURE — 94762 N-INVAS EAR/PLS OXIMTRY CONT: CPT

## 2024-08-19 PROCEDURE — 83690 ASSAY OF LIPASE: CPT | Performed by: EMERGENCY MEDICINE

## 2024-08-19 PROCEDURE — 96361 HYDRATE IV INFUSION ADD-ON: CPT

## 2024-08-19 RX ORDER — ROPINIROLE 1 MG/1
1 TABLET, FILM COATED ORAL ONCE
Status: COMPLETED | OUTPATIENT
Start: 2024-08-19 | End: 2024-08-19

## 2024-08-19 RX ORDER — VANCOMYCIN HYDROCHLORIDE 1 G/200ML
1000 INJECTION, SOLUTION INTRAVENOUS ONCE
Status: COMPLETED | OUTPATIENT
Start: 2024-08-19 | End: 2024-08-20

## 2024-08-19 RX ORDER — SODIUM CHLORIDE 9 MG/ML
75 INJECTION, SOLUTION INTRAVENOUS CONTINUOUS
Status: DISCONTINUED | OUTPATIENT
Start: 2024-08-19 | End: 2024-08-21 | Stop reason: HOSPADM

## 2024-08-19 RX ORDER — ONDANSETRON 8 MG/1
8 TABLET, ORALLY DISINTEGRATING ORAL ONCE
Status: COMPLETED | OUTPATIENT
Start: 2024-08-19 | End: 2024-08-19

## 2024-08-19 RX ORDER — ROPINIROLE 1 MG/1
TABLET, FILM COATED ORAL
Status: COMPLETED
Start: 2024-08-19 | End: 2024-08-19

## 2024-08-19 RX ORDER — PROCHLORPERAZINE EDISYLATE 5 MG/ML
5 INJECTION INTRAMUSCULAR; INTRAVENOUS ONCE
Status: COMPLETED | OUTPATIENT
Start: 2024-08-19 | End: 2024-08-19

## 2024-08-19 RX ORDER — PROCHLORPERAZINE EDISYLATE 5 MG/ML
INJECTION INTRAMUSCULAR; INTRAVENOUS
Status: COMPLETED
Start: 2024-08-19 | End: 2024-08-19

## 2024-08-19 RX ORDER — ONDANSETRON HYDROCHLORIDE 2 MG/ML
4 INJECTION, SOLUTION INTRAVENOUS ONCE
Status: COMPLETED | OUTPATIENT
Start: 2024-08-19 | End: 2024-08-19

## 2024-08-19 SDOH — ECONOMIC STABILITY: HOUSING INSECURITY: AT ANY TIME IN THE PAST 12 MONTHS, WERE YOU HOMELESS OR LIVING IN A SHELTER (INCLUDING NOW)?: NO

## 2024-08-19 SDOH — SOCIAL STABILITY: SOCIAL INSECURITY: HAVE YOU HAD THOUGHTS OF HARMING ANYONE ELSE?: NO

## 2024-08-19 SDOH — SOCIAL STABILITY: SOCIAL INSECURITY: DO YOU FEEL ANYONE HAS EXPLOITED OR TAKEN ADVANTAGE OF YOU FINANCIALLY OR OF YOUR PERSONAL PROPERTY?: NO

## 2024-08-19 SDOH — HEALTH STABILITY: MENTAL HEALTH: HOW MANY STANDARD DRINKS CONTAINING ALCOHOL DO YOU HAVE ON A TYPICAL DAY?: PATIENT DOES NOT DRINK

## 2024-08-19 SDOH — SOCIAL STABILITY: SOCIAL INSECURITY: DO YOU FEEL UNSAFE GOING BACK TO THE PLACE WHERE YOU ARE LIVING?: NO

## 2024-08-19 SDOH — HEALTH STABILITY: MENTAL HEALTH: HOW OFTEN DO YOU HAVE 6 OR MORE DRINKS ON ONE OCCASION?: NEVER

## 2024-08-19 SDOH — SOCIAL STABILITY: SOCIAL INSECURITY: HAS ANYONE EVER THREATENED TO HURT YOUR FAMILY OR YOUR PETS?: NO

## 2024-08-19 SDOH — HEALTH STABILITY: MENTAL HEALTH: HOW OFTEN DO YOU HAVE A DRINK CONTAINING ALCOHOL?: NEVER

## 2024-08-19 SDOH — ECONOMIC STABILITY: INCOME INSECURITY: IN THE LAST 12 MONTHS, WAS THERE A TIME WHEN YOU WERE NOT ABLE TO PAY THE MORTGAGE OR RENT ON TIME?: NO

## 2024-08-19 SDOH — SOCIAL STABILITY: SOCIAL INSECURITY: ARE YOU OR HAVE YOU BEEN THREATENED OR ABUSED PHYSICALLY, EMOTIONALLY, OR SEXUALLY BY ANYONE?: NO

## 2024-08-19 SDOH — SOCIAL STABILITY: SOCIAL INSECURITY: HAVE YOU HAD ANY THOUGHTS OF HARMING ANYONE ELSE?: NO

## 2024-08-19 SDOH — SOCIAL STABILITY: SOCIAL INSECURITY: WERE YOU ABLE TO COMPLETE ALL THE BEHAVIORAL HEALTH SCREENINGS?: YES

## 2024-08-19 SDOH — HEALTH STABILITY: MENTAL HEALTH
HOW OFTEN DO YOU NEED TO HAVE SOMEONE HELP YOU WHEN YOU READ INSTRUCTIONS, PAMPHLETS, OR OTHER WRITTEN MATERIAL FROM YOUR DOCTOR OR PHARMACY?: NEVER

## 2024-08-19 SDOH — ECONOMIC STABILITY: INCOME INSECURITY: IN THE PAST 12 MONTHS, HAS THE ELECTRIC, GAS, OIL, OR WATER COMPANY THREATENED TO SHUT OFF SERVICE IN YOUR HOME?: NO

## 2024-08-19 SDOH — SOCIAL STABILITY: SOCIAL INSECURITY: ARE THERE ANY APPARENT SIGNS OF INJURIES/BEHAVIORS THAT COULD BE RELATED TO ABUSE/NEGLECT?: NO

## 2024-08-19 SDOH — ECONOMIC STABILITY: TRANSPORTATION INSECURITY
IN THE PAST 12 MONTHS, HAS LACK OF TRANSPORTATION KEPT YOU FROM MEETINGS, WORK, OR FROM GETTING THINGS NEEDED FOR DAILY LIVING?: NO

## 2024-08-19 SDOH — SOCIAL STABILITY: SOCIAL INSECURITY: DOES ANYONE TRY TO KEEP YOU FROM HAVING/CONTACTING OTHER FRIENDS OR DOING THINGS OUTSIDE YOUR HOME?: NO

## 2024-08-19 SDOH — ECONOMIC STABILITY: HOUSING INSECURITY: IN THE PAST 12 MONTHS, HOW MANY TIMES HAVE YOU MOVED WHERE YOU WERE LIVING?: 0

## 2024-08-19 SDOH — ECONOMIC STABILITY: INCOME INSECURITY: HOW HARD IS IT FOR YOU TO PAY FOR THE VERY BASICS LIKE FOOD, HOUSING, MEDICAL CARE, AND HEATING?: NOT HARD AT ALL

## 2024-08-19 SDOH — SOCIAL STABILITY: SOCIAL INSECURITY: ABUSE: ADULT

## 2024-08-19 ASSESSMENT — LIFESTYLE VARIABLES
AUDIT-C TOTAL SCORE: 0
HOW OFTEN DO YOU HAVE A DRINK CONTAINING ALCOHOL: NEVER
HOW MANY STANDARD DRINKS CONTAINING ALCOHOL DO YOU HAVE ON A TYPICAL DAY: PATIENT DOES NOT DRINK
AUDIT-C TOTAL SCORE: 0
AUDIT-C TOTAL SCORE: 0
PRESCIPTION_ABUSE_PAST_12_MONTHS: NO
SUBSTANCE_ABUSE_PAST_12_MONTHS: NO
SKIP TO QUESTIONS 9-10: 1
SKIP TO QUESTIONS 9-10: 1
HOW OFTEN DO YOU HAVE 6 OR MORE DRINKS ON ONE OCCASION: NEVER

## 2024-08-19 ASSESSMENT — COGNITIVE AND FUNCTIONAL STATUS - GENERAL
WALKING IN HOSPITAL ROOM: A LITTLE
MOVING TO AND FROM BED TO CHAIR: A LITTLE
DRESSING REGULAR UPPER BODY CLOTHING: A LITTLE
STANDING UP FROM CHAIR USING ARMS: A LITTLE
DRESSING REGULAR LOWER BODY CLOTHING: A LITTLE
PATIENT BASELINE BEDBOUND: NO
DAILY ACTIVITIY SCORE: 20
HELP NEEDED FOR BATHING: A LITTLE
TOILETING: A LITTLE
MOBILITY SCORE: 20
CLIMB 3 TO 5 STEPS WITH RAILING: A LITTLE

## 2024-08-19 ASSESSMENT — ACTIVITIES OF DAILY LIVING (ADL)
PATIENT'S MEMORY ADEQUATE TO SAFELY COMPLETE DAILY ACTIVITIES?: YES
TOILETING: NEEDS ASSISTANCE
JUDGMENT_ADEQUATE_SAFELY_COMPLETE_DAILY_ACTIVITIES: YES
ADEQUATE_TO_COMPLETE_ADL: YES
GROOMING: INDEPENDENT
HEARING - LEFT EAR: FUNCTIONAL
HEARING - RIGHT EAR: FUNCTIONAL
BATHING: NEEDS ASSISTANCE
WALKS IN HOME: INDEPENDENT
FEEDING YOURSELF: INDEPENDENT
LACK_OF_TRANSPORTATION: NO
DRESSING YOURSELF: NEEDS ASSISTANCE

## 2024-08-19 ASSESSMENT — PAIN DESCRIPTION - ORIENTATION: ORIENTATION: RIGHT

## 2024-08-19 ASSESSMENT — VISUAL ACUITY: OU: 1

## 2024-08-19 ASSESSMENT — COLUMBIA-SUICIDE SEVERITY RATING SCALE - C-SSRS
1. IN THE PAST MONTH, HAVE YOU WISHED YOU WERE DEAD OR WISHED YOU COULD GO TO SLEEP AND NOT WAKE UP?: NO
2. HAVE YOU ACTUALLY HAD ANY THOUGHTS OF KILLING YOURSELF?: NO
6. HAVE YOU EVER DONE ANYTHING, STARTED TO DO ANYTHING, OR PREPARED TO DO ANYTHING TO END YOUR LIFE?: NO

## 2024-08-19 ASSESSMENT — PAIN SCALES - GENERAL
PAINLEVEL_OUTOF10: 2
PAINLEVEL_OUTOF10: 9
PAINLEVEL_OUTOF10: 2
PAINLEVEL_OUTOF10: 4
PAINLEVEL_OUTOF10: 2

## 2024-08-19 ASSESSMENT — PAIN DESCRIPTION - LOCATION: LOCATION: GROIN

## 2024-08-19 ASSESSMENT — PAIN - FUNCTIONAL ASSESSMENT
PAIN_FUNCTIONAL_ASSESSMENT: 0-10
PAIN_FUNCTIONAL_ASSESSMENT: 0-10

## 2024-08-19 ASSESSMENT — PAIN DESCRIPTION - PAIN TYPE: TYPE: ACUTE PAIN

## 2024-08-19 ASSESSMENT — PATIENT HEALTH QUESTIONNAIRE - PHQ9: 2. FEELING DOWN, DEPRESSED OR HOPELESS: NOT AT ALL

## 2024-08-19 NOTE — ED PROVIDER NOTES
Right hip pain.  This 78-year-old white female presents to the ED with complaints of 3-week history of right hip and groin pain.  Denies any history of abdominal pain, hernias or urinary symptoms.  She denies any history of fevers or chills.  She denies any known history of traumatic injury to her right hip she does admit to previous right hip replacement surgery due to arthritis and pain.  She states that the pain is worse with weightbearing and/or ambulation and states that she also notes increased pain with pushing in her right inguinal area.  She admits to chronic back pain that is not new or different.  She also admits to having bilateral knee replacement surgery previously.  She denies any new focal neurologic symptoms.      History provided by:  Patient and relative   used: No         Physical Exam  Vitals and nursing note reviewed.   Constitutional:       General: She is awake.      Appearance: Normal appearance. She is normal weight.      Comments: Appears to be uncomfortable because of her hip pain   HENT:      Head: Normocephalic and atraumatic.      Right Ear: Hearing and external ear normal.      Left Ear: Hearing and external ear normal.      Nose: Nose normal. No congestion or rhinorrhea.      Mouth/Throat:      Lips: Pink.      Mouth: Mucous membranes are moist.      Pharynx: Oropharynx is clear. Uvula midline. No oropharyngeal exudate or posterior oropharyngeal erythema.   Eyes:      General: Lids are normal. Vision grossly intact.         Right eye: No discharge.         Left eye: No discharge.      Extraocular Movements: Extraocular movements intact.      Conjunctiva/sclera: Conjunctivae normal.      Pupils: Pupils are equal, round, and reactive to light.   Cardiovascular:      Rate and Rhythm: Normal rate and regular rhythm.      Pulses: Normal pulses.      Heart sounds: Normal heart sounds. No murmur heard.     No friction rub. No gallop.   Pulmonary:      Effort: Pulmonary  effort is normal. No respiratory distress.      Breath sounds: Normal breath sounds. No stridor. No wheezing, rhonchi or rales.   Chest:      Chest wall: No tenderness.   Abdominal:      General: Abdomen is flat. Bowel sounds are normal. There is no distension.      Palpations: Abdomen is soft. There is no mass.      Tenderness: There is no abdominal tenderness. There is no guarding or rebound.      Hernia: No hernia is present.       Musculoskeletal:         General: No swelling, deformity or signs of injury. Normal range of motion.      Cervical back: Full passive range of motion without pain, normal range of motion and neck supple.      Right hip: Tenderness present.      Left hip: Normal.      Right lower leg: No edema.      Left lower leg: No edema.      Comments: Evaluation of the right lower extremity revealed distal pulses are +2/4 and present at the dorsalis pedis and tibialis.  Cap refill less than 2 seconds.  Patient does have increased pain with logroll and also with palpation of the greater trochanter.  Pelvis is stable negative pelvic rock.  Patient does have tenderness with palpation of the anterior right inguinal area.  There is no associated hernias, erythema or evidence of lymphadenopathy.   Skin:     General: Skin is warm and dry.      Capillary Refill: Capillary refill takes less than 2 seconds.      Coloration: Skin is not jaundiced or pale.      Findings: No bruising, erythema, lesion or rash.   Neurological:      General: No focal deficit present.      Mental Status: She is alert and oriented to person, place, and time.      GCS: GCS eye subscore is 4. GCS verbal subscore is 5. GCS motor subscore is 6.      Cranial Nerves: Cranial nerves 2-12 are intact. No cranial nerve deficit.      Sensory: Sensation is intact. No sensory deficit.      Motor: Motor function is intact. No weakness.      Coordination: Coordination is intact. Coordination normal.      Deep Tendon Reflexes: Reflexes normal.    Psychiatric:         Attention and Perception: Attention and perception normal.         Mood and Affect: Mood and affect normal.         Speech: Speech normal.         Behavior: Behavior normal. Behavior is cooperative.         Thought Content: Thought content normal.         Cognition and Memory: Cognition and memory normal.         Judgment: Judgment normal.          Labs Reviewed   LIPASE - Abnormal       Result Value    Lipase 158 (*)     Narrative:     Venipuncture immediately after or during the administration of Metamizole may lead to falsely low results. Testing should be performed immediately prior to Metamizole dosing.   COMPREHENSIVE METABOLIC PANEL - Abnormal    Glucose 127 (*)     Sodium 137      Potassium 5.5 (*)     Chloride 106      Bicarbonate 27      Anion Gap 10      Urea Nitrogen 33 (*)     Creatinine 1.12 (*)     eGFR 50 (*)     Calcium 9.4      Albumin 4.1      Alkaline Phosphatase 52      Total Protein 6.5      AST 19      Bilirubin, Total 0.2      ALT 21     CBC WITH AUTO DIFFERENTIAL - Abnormal    WBC 12.5 (*)     nRBC 0.0      RBC 3.45 (*)     Hemoglobin 11.5 (*)     Hematocrit 35.7 (*)      (*)     MCH 33.3      MCHC 32.2      RDW 13.2      Platelets 291      Neutrophils % 46.6      Immature Granulocytes %, Automated 0.2      Lymphocytes % 39.6      Monocytes % 9.3      Eosinophils % 3.9      Basophils % 0.4      Neutrophils Absolute 5.82 (*)     Immature Granulocytes Absolute, Automated 0.03      Lymphocytes Absolute 4.95 (*)     Monocytes Absolute 1.16 (*)     Eosinophils Absolute 0.49 (*)     Basophils Absolute 0.05     LACTATE - Normal    Lactate 1.1      Narrative:     Venipuncture immediately after or during the administration of Metamizole may lead to falsely low results. Testing should be performed immediately  prior to Metamizole dosing.   URINALYSIS WITH REFLEX CULTURE AND MICROSCOPIC    Narrative:     The following orders were created for panel order Urinalysis with  Reflex Culture and Microscopic.  Procedure                               Abnormality         Status                     ---------                               -----------         ------                     Urinalysis with Reflex C...[[id number]]                                                 Extra Urine Gray Tube[[id number]]                                                         Please view results for these tests on the individual orders.   URINALYSIS WITH REFLEX CULTURE AND MICROSCOPIC   EXTRA URINE GRAY TUBE        XR hip right with pelvis when performed 2 or 3 views   Final Result   Right hip arthroplasty in anatomic alignment.   Degenerative changes of the left hip joint and lower lumbar spine.   No acute osseous abnormality.   Signed by Emre Aguero MD      CT lumbar spine wo IV contrast    (Results Pending)   CT abdomen pelvis w IV contrast    (Results Pending)   CT hip right wo IV contrast    (Results Pending)        Procedures     Medical Decision Making  Patient was seen and evaluated due to complaints of right hip pain she has been experiencing symptoms for 3 weeks now.  Clinically the patient had no evidence of a inguinal hernia though she did have some tenderness in the inguinal area itself.  There is no skin changes noted patient did complain of increased pain with logroll and palpation of the greater trochanter.  Initially x-rays of the right hip were performed with the pelvis this revealed evidence of a previous hip replacement without other obvious etiology of patient's symptoms.  I went back to talk to the patient she was feeling more comfortable after getting pain medication and told her that we would be investigating other possible causes of her right inguinal pain.  Patient was ordered a CT scan of the lumbar spine in addition to lab work and imaging of the abdomen pelvis with IV contrast.  Patient care was turned over to the oncoming ED attending at 7 PM.                           Wolf REYEZ  DO Ren  08/19/24 [id number]

## 2024-08-20 ENCOUNTER — APPOINTMENT (OUTPATIENT)
Dept: RADIOLOGY | Facility: HOSPITAL | Age: 79
DRG: 558 | End: 2024-08-20
Payer: COMMERCIAL

## 2024-08-20 LAB
ANION GAP SERPL CALC-SCNC: 8 MMOL/L (ref 10–20)
BUN SERPL-MCNC: 24 MG/DL (ref 6–23)
CALCIUM SERPL-MCNC: 9 MG/DL (ref 8.6–10.3)
CHLORIDE SERPL-SCNC: 108 MMOL/L (ref 98–107)
CO2 SERPL-SCNC: 25 MMOL/L (ref 21–32)
CREAT SERPL-MCNC: 1 MG/DL (ref 0.5–1.05)
EGFRCR SERPLBLD CKD-EPI 2021: 57 ML/MIN/1.73M*2
ERYTHROCYTE [DISTWIDTH] IN BLOOD BY AUTOMATED COUNT: 13.1 % (ref 11.5–14.5)
GLUCOSE BLD MANUAL STRIP-MCNC: 112 MG/DL (ref 74–99)
GLUCOSE BLD MANUAL STRIP-MCNC: 168 MG/DL (ref 74–99)
GLUCOSE BLD MANUAL STRIP-MCNC: 202 MG/DL (ref 74–99)
GLUCOSE BLD MANUAL STRIP-MCNC: 97 MG/DL (ref 74–99)
GLUCOSE SERPL-MCNC: 114 MG/DL (ref 74–99)
HCT VFR BLD AUTO: 33.4 % (ref 36–46)
HCT VFR BLD AUTO: 34.6 % (ref 36–46)
HGB BLD-MCNC: 10.7 G/DL (ref 12–16)
HGB BLD-MCNC: 11 G/DL (ref 12–16)
HOLD SPECIMEN: NORMAL
MCH RBC QN AUTO: 32.9 PG (ref 26–34)
MCHC RBC AUTO-ENTMCNC: 32 G/DL (ref 32–36)
MCV RBC AUTO: 103 FL (ref 80–100)
NRBC BLD-RTO: 0 /100 WBCS (ref 0–0)
PLATELET # BLD AUTO: 258 X10*3/UL (ref 150–450)
POTASSIUM SERPL-SCNC: 4.4 MMOL/L (ref 3.5–5.3)
RBC # BLD AUTO: 3.25 X10*6/UL (ref 4–5.2)
SODIUM SERPL-SCNC: 137 MMOL/L (ref 136–145)
WBC # BLD AUTO: 10.7 X10*3/UL (ref 4.4–11.3)

## 2024-08-20 PROCEDURE — 99221 1ST HOSP IP/OBS SF/LOW 40: CPT | Performed by: NURSE PRACTITIONER

## 2024-08-20 PROCEDURE — 82947 ASSAY GLUCOSE BLOOD QUANT: CPT

## 2024-08-20 PROCEDURE — A9575 INJ GADOTERATE MEGLUMI 0.1ML: HCPCS | Performed by: INTERNAL MEDICINE

## 2024-08-20 PROCEDURE — 36415 COLL VENOUS BLD VENIPUNCTURE: CPT

## 2024-08-20 PROCEDURE — 2500000001 HC RX 250 WO HCPCS SELF ADMINISTERED DRUGS (ALT 637 FOR MEDICARE OP): Performed by: INTERNAL MEDICINE

## 2024-08-20 PROCEDURE — 94664 DEMO&/EVAL PT USE INHALER: CPT

## 2024-08-20 PROCEDURE — 2500000002 HC RX 250 W HCPCS SELF ADMINISTERED DRUGS (ALT 637 FOR MEDICARE OP, ALT 636 FOR OP/ED): Performed by: INTERNAL MEDICINE

## 2024-08-20 PROCEDURE — 80048 BASIC METABOLIC PNL TOTAL CA: CPT | Performed by: INTERNAL MEDICINE

## 2024-08-20 PROCEDURE — 72197 MRI PELVIS W/O & W/DYE: CPT

## 2024-08-20 PROCEDURE — 2550000001 HC RX 255 CONTRASTS: Performed by: INTERNAL MEDICINE

## 2024-08-20 PROCEDURE — 2500000001 HC RX 250 WO HCPCS SELF ADMINISTERED DRUGS (ALT 637 FOR MEDICARE OP)

## 2024-08-20 PROCEDURE — 85014 HEMATOCRIT: CPT

## 2024-08-20 PROCEDURE — 36415 COLL VENOUS BLD VENIPUNCTURE: CPT | Performed by: INTERNAL MEDICINE

## 2024-08-20 PROCEDURE — 2500000004 HC RX 250 GENERAL PHARMACY W/ HCPCS (ALT 636 FOR OP/ED): Performed by: INTERNAL MEDICINE

## 2024-08-20 PROCEDURE — 9420000001 HC RT PATIENT EDUCATION 5 MIN

## 2024-08-20 PROCEDURE — 1100000001 HC PRIVATE ROOM DAILY

## 2024-08-20 PROCEDURE — 2500000004 HC RX 250 GENERAL PHARMACY W/ HCPCS (ALT 636 FOR OP/ED)

## 2024-08-20 PROCEDURE — 94660 CPAP INITIATION&MGMT: CPT

## 2024-08-20 PROCEDURE — 85027 COMPLETE CBC AUTOMATED: CPT | Performed by: INTERNAL MEDICINE

## 2024-08-20 PROCEDURE — 72197 MRI PELVIS W/O & W/DYE: CPT | Performed by: STUDENT IN AN ORGANIZED HEALTH CARE EDUCATION/TRAINING PROGRAM

## 2024-08-20 RX ORDER — ACETAMINOPHEN 160 MG/5ML
650 SOLUTION ORAL EVERY 4 HOURS PRN
Status: DISCONTINUED | OUTPATIENT
Start: 2024-08-20 | End: 2024-08-21 | Stop reason: HOSPADM

## 2024-08-20 RX ORDER — VANCOMYCIN HYDROCHLORIDE 1 G/20ML
INJECTION, POWDER, LYOPHILIZED, FOR SOLUTION INTRAVENOUS DAILY PRN
Status: DISCONTINUED | OUTPATIENT
Start: 2024-08-20 | End: 2024-08-21

## 2024-08-20 RX ORDER — AMLODIPINE BESYLATE 5 MG/1
2.5 TABLET ORAL
Status: DISCONTINUED | OUTPATIENT
Start: 2024-08-20 | End: 2024-08-21 | Stop reason: HOSPADM

## 2024-08-20 RX ORDER — FERROUS SULFATE 325(65) MG
1 TABLET ORAL
Status: DISCONTINUED | OUTPATIENT
Start: 2024-08-20 | End: 2024-08-21 | Stop reason: HOSPADM

## 2024-08-20 RX ORDER — SODIUM POLYSTYRENE SULFONATE 15 G/60ML
15 SUSPENSION ORAL; RECTAL ONCE
Status: COMPLETED | OUTPATIENT
Start: 2024-08-20 | End: 2024-08-20

## 2024-08-20 RX ORDER — GADOTERATE MEGLUMINE 376.9 MG/ML
14 INJECTION INTRAVENOUS
Status: COMPLETED | OUTPATIENT
Start: 2024-08-20 | End: 2024-08-20

## 2024-08-20 RX ORDER — ONDANSETRON HYDROCHLORIDE 2 MG/ML
4 INJECTION, SOLUTION INTRAVENOUS EVERY 8 HOURS PRN
Status: DISCONTINUED | OUTPATIENT
Start: 2024-08-20 | End: 2024-08-21 | Stop reason: HOSPADM

## 2024-08-20 RX ORDER — DEXTROSE 50 % IN WATER (D50W) INTRAVENOUS SYRINGE
25
Status: DISCONTINUED | OUTPATIENT
Start: 2024-08-20 | End: 2024-08-21 | Stop reason: HOSPADM

## 2024-08-20 RX ORDER — MAGNESIUM HYDROXIDE 2400 MG/10ML
10 SUSPENSION ORAL DAILY PRN
Status: DISCONTINUED | OUTPATIENT
Start: 2024-08-20 | End: 2024-08-21 | Stop reason: HOSPADM

## 2024-08-20 RX ORDER — LOSARTAN POTASSIUM 100 MG/1
100 TABLET ORAL DAILY
Status: DISCONTINUED | OUTPATIENT
Start: 2024-08-20 | End: 2024-08-21 | Stop reason: HOSPADM

## 2024-08-20 RX ORDER — INSULIN LISPRO 100 [IU]/ML
0-10 INJECTION, SOLUTION INTRAVENOUS; SUBCUTANEOUS
Status: DISCONTINUED | OUTPATIENT
Start: 2024-08-20 | End: 2024-08-21 | Stop reason: HOSPADM

## 2024-08-20 RX ORDER — DEXTROSE 50 % IN WATER (D50W) INTRAVENOUS SYRINGE
12.5
Status: DISCONTINUED | OUTPATIENT
Start: 2024-08-20 | End: 2024-08-21 | Stop reason: HOSPADM

## 2024-08-20 RX ORDER — ROPINIROLE 1 MG/1
1 TABLET, FILM COATED ORAL
Status: DISCONTINUED | OUTPATIENT
Start: 2024-08-20 | End: 2024-08-21 | Stop reason: HOSPADM

## 2024-08-20 RX ORDER — ACETAMINOPHEN 650 MG/1
650 SUPPOSITORY RECTAL EVERY 4 HOURS PRN
Status: DISCONTINUED | OUTPATIENT
Start: 2024-08-20 | End: 2024-08-21 | Stop reason: HOSPADM

## 2024-08-20 RX ORDER — ONDANSETRON 4 MG/1
4 TABLET, ORALLY DISINTEGRATING ORAL EVERY 8 HOURS PRN
Status: DISCONTINUED | OUTPATIENT
Start: 2024-08-20 | End: 2024-08-21 | Stop reason: HOSPADM

## 2024-08-20 RX ORDER — IBUPROFEN 600 MG/1
600 TABLET ORAL
Status: DISCONTINUED | OUTPATIENT
Start: 2024-08-20 | End: 2024-08-21 | Stop reason: HOSPADM

## 2024-08-20 RX ORDER — ACETAMINOPHEN 325 MG/1
650 TABLET ORAL EVERY 4 HOURS PRN
Status: DISCONTINUED | OUTPATIENT
Start: 2024-08-20 | End: 2024-08-21 | Stop reason: HOSPADM

## 2024-08-20 RX ORDER — ATORVASTATIN CALCIUM 80 MG/1
80 TABLET, FILM COATED ORAL DAILY
Status: DISCONTINUED | OUTPATIENT
Start: 2024-08-20 | End: 2024-08-21 | Stop reason: HOSPADM

## 2024-08-20 RX ORDER — METOPROLOL SUCCINATE 25 MG/1
25 TABLET, EXTENDED RELEASE ORAL 2 TIMES DAILY
Status: DISCONTINUED | OUTPATIENT
Start: 2024-08-20 | End: 2024-08-21 | Stop reason: HOSPADM

## 2024-08-20 RX ORDER — VANCOMYCIN HYDROCHLORIDE 1 G/200ML
1 INJECTION, SOLUTION INTRAVENOUS EVERY 24 HOURS
Status: DISCONTINUED | OUTPATIENT
Start: 2024-08-20 | End: 2024-08-21

## 2024-08-20 SDOH — SOCIAL STABILITY: SOCIAL INSECURITY: ARE YOU OR HAVE YOU BEEN THREATENED OR ABUSED PHYSICALLY, EMOTIONALLY, OR SEXUALLY BY ANYONE?: NO

## 2024-08-20 SDOH — SOCIAL STABILITY: SOCIAL INSECURITY: HAVE YOU HAD ANY THOUGHTS OF HARMING ANYONE ELSE?: NO

## 2024-08-20 SDOH — SOCIAL STABILITY: SOCIAL INSECURITY: WERE YOU ABLE TO COMPLETE ALL THE BEHAVIORAL HEALTH SCREENINGS?: YES

## 2024-08-20 SDOH — SOCIAL STABILITY: SOCIAL INSECURITY: ARE THERE ANY APPARENT SIGNS OF INJURIES/BEHAVIORS THAT COULD BE RELATED TO ABUSE/NEGLECT?: NO

## 2024-08-20 SDOH — SOCIAL STABILITY: SOCIAL INSECURITY: DO YOU FEEL UNSAFE GOING BACK TO THE PLACE WHERE YOU ARE LIVING?: NO

## 2024-08-20 SDOH — SOCIAL STABILITY: SOCIAL INSECURITY: DOES ANYONE TRY TO KEEP YOU FROM HAVING/CONTACTING OTHER FRIENDS OR DOING THINGS OUTSIDE YOUR HOME?: NO

## 2024-08-20 SDOH — SOCIAL STABILITY: SOCIAL INSECURITY: DO YOU FEEL ANYONE HAS EXPLOITED OR TAKEN ADVANTAGE OF YOU FINANCIALLY OR OF YOUR PERSONAL PROPERTY?: NO

## 2024-08-20 SDOH — SOCIAL STABILITY: SOCIAL INSECURITY: HAS ANYONE EVER THREATENED TO HURT YOUR FAMILY OR YOUR PETS?: NO

## 2024-08-20 SDOH — SOCIAL STABILITY: SOCIAL INSECURITY: HAVE YOU HAD THOUGHTS OF HARMING ANYONE ELSE?: NO

## 2024-08-20 SDOH — SOCIAL STABILITY: SOCIAL INSECURITY: ABUSE: ADULT

## 2024-08-20 ASSESSMENT — COGNITIVE AND FUNCTIONAL STATUS - GENERAL
MOVING TO AND FROM BED TO CHAIR: A LITTLE
MOBILITY SCORE: 20
DRESSING REGULAR LOWER BODY CLOTHING: A LITTLE
DRESSING REGULAR UPPER BODY CLOTHING: A LITTLE
TOILETING: A LITTLE
PERSONAL GROOMING: A LITTLE
DAILY ACTIVITIY SCORE: 19
WALKING IN HOSPITAL ROOM: A LITTLE
STANDING UP FROM CHAIR USING ARMS: A LITTLE
HELP NEEDED FOR BATHING: A LITTLE
CLIMB 3 TO 5 STEPS WITH RAILING: A LITTLE

## 2024-08-20 ASSESSMENT — PAIN SCALES - GENERAL
PAINLEVEL_OUTOF10: 0 - NO PAIN

## 2024-08-20 ASSESSMENT — PATIENT HEALTH QUESTIONNAIRE - PHQ9
SUM OF ALL RESPONSES TO PHQ9 QUESTIONS 1 & 2: 0
2. FEELING DOWN, DEPRESSED OR HOPELESS: NOT AT ALL
1. LITTLE INTEREST OR PLEASURE IN DOING THINGS: NOT AT ALL

## 2024-08-20 ASSESSMENT — PAIN - FUNCTIONAL ASSESSMENT
PAIN_FUNCTIONAL_ASSESSMENT: 0-10

## 2024-08-20 ASSESSMENT — ENCOUNTER SYMPTOMS
RESPIRATORY NEGATIVE: 1
NEUROLOGICAL NEGATIVE: 1
ENDOCRINE NEGATIVE: 1
HEMATOLOGIC/LYMPHATIC NEGATIVE: 1
CONSTITUTIONAL NEGATIVE: 1
PSYCHIATRIC NEGATIVE: 1
CARDIOVASCULAR NEGATIVE: 1
ARTHRALGIAS: 1

## 2024-08-20 ASSESSMENT — LIFESTYLE VARIABLES
AUDIT-C TOTAL SCORE: 0
SKIP TO QUESTIONS 9-10: 1
HOW OFTEN DO YOU HAVE A DRINK CONTAINING ALCOHOL: NEVER
HOW MANY STANDARD DRINKS CONTAINING ALCOHOL DO YOU HAVE ON A TYPICAL DAY: PATIENT DOES NOT DRINK
HOW OFTEN DO YOU HAVE 6 OR MORE DRINKS ON ONE OCCASION: NEVER
AUDIT-C TOTAL SCORE: 0

## 2024-08-20 ASSESSMENT — ACTIVITIES OF DAILY LIVING (ADL): LACK_OF_TRANSPORTATION: NO

## 2024-08-20 NOTE — H&P
History Of Present Illness  Quyen Rolon is a 78 y.o. female  Who presented to the emergency room for a 3 weeks history of right groin pain.  On presentation, blood pressure 171/80, heart rate 67, respiratory rate 18, afebrile, saturation oxygen 96% on room air.  Pertinent findings on blood workup; WBC 12,500, hemoglobin 11.5, creatinine 1.12, BUN 33, potassium 5.5, and lipase 158.  Urinalysis came back positive for leukocyte esterase.  CT scan of the abdomen and pelvis showed asymmetric enlargement of the right iliopsoas muscle with 2 loculated fluid collections extending along the right iliac muscle.  Patient was given in the emergency room IV Dilaudid, Zofran, Zosyn, Compazine, and vancomycin and IV fluids and then admitted to the medical service for further investigation and management.     ROS  10 systems were reviewed and were negative except for those noted in the history of present illness.    Past Medical History  Past Medical History:   Diagnosis Date    CAD (coronary artery disease)     Diabetes mellitus (Multi)     High cholesterol     Hypertension     Restless leg     Uterine cancer (Multi)      Pertinent medical history also documented in my below narrative    Surgical History  Past Surgical History:   Procedure Laterality Date    APPENDECTOMY      CAROTID STENT      EYE SURGERY Bilateral     Cataract    FOOT SURGERY Left     HYSTERECTOMY      NECK SURGERY      Carotid artery    REVISION TOTAL HIP ARTHROPLASTY Right     TOTAL KNEE ARTHROPLASTY Bilateral     TUBAL LIGATION        Pertinent surgical history also documented in my below narrative    Social History  She reports that she has never smoked. She has never been exposed to tobacco smoke. She has never used smokeless tobacco. She reports that she does not drink alcohol and does not use drugs.    Family History  Family History   Problem Relation Name Age of Onset    Heart attack Mother      Hypertension Mother      Diabetes Mother       Cervical cancer Sister      Cancer Brother 3 brothers 1 alive     Prostate cancer Brother 3 brothers 1 alive         Allergies  Morphine, Ampicillin, Guaifenesin, Iodinated contrast media, Meperidine, Pollen extracts, Propoxyphene n-acetaminophen, Ceftriaxone, and Latex    Medications Prior to Admission   Medication Sig Dispense Refill Last Dose    acetaminophen (Tylenol) 500 mg tablet Take 1 tablet (500 mg) by mouth every 6 hours if needed for moderate pain (4 - 6), headaches, fever (temp greater than 38.0 C) or mild pain (1 - 3).   8/19/2024 at 1:30PM    amLODIPine (Norvasc) 2.5 mg tablet Take 1 tablet (2.5 mg) by mouth once daily. 90 tablet 3 8/19/2024 at AM    aspirin 81 mg chewable tablet Chew 1 tablet (81 mg) once daily.   8/18/2024 at PM    atorvastatin (Lipitor) 80 mg tablet Take 1 tablet (80 mg) by mouth once daily. 90 tablet 3 8/19/2024 at AM    cholecalciferol (Vitamin D-3) 5,000 Units tablet Take 4,000 Units by mouth once daily.   8/18/2024 at PM    ferrous sulfate, 325 mg ferrous sulfate, tablet Take 1 tablet by mouth once daily with breakfast.   8/19/2024 at AM    losartan (Cozaar) 100 mg tablet Take 1 tablet (100 mg) by mouth once daily. 90 tablet 3 8/19/2024 at AM    MAGNESIUM ORAL Take 400 mg by mouth twice a day.   8/19/2024 at AM    metFORMIN XR (Glucophage-XR) 500 mg 24 hr tablet Take 2 tablets (1,000 mg) by mouth 2 times daily (morning and late afternoon). 360 tablet 3 8/19/2024 at AM    metoprolol succinate XL (Toprol-XL) 50 mg 24 hr tablet Take 0.5 tablets (25 mg) by mouth 2 times a day. 90 tablet 3 8/19/2024 at AM    MULTIVITAMIN ORAL Take 1 tablet by mouth once daily.   8/19/2024 at AM    polyethylene glycol 400 (BLINK TEARS OPHT) Administer 1 drop into affected eye(s) once daily as needed (dry eyes).   8/19/2024 at AM    rOPINIRole (Requip) 1 mg tablet Take 1 tablet (1 mg) by mouth once daily. 90 tablet 3 8/18/2024 at PM    VITAMIN B COMPLEX ORAL Take 1 tablet by mouth once daily.    "8/19/2024 at AM    fluticasone (Flonase) 50 mcg/actuation nasal spray Administer 1 spray into each nostril once daily. Shake gently. Before first use, prime pump. After use, clean tip and replace cap. 16 g 11     naproxen (Naprosyn) 500 mg tablet Take 1 tablet (500 mg) by mouth 2 times daily (morning and late afternoon).       nitroglycerin (Nitrostat) 0.4 mg SL tablet Place 1 tablet (0.4 mg) under the tongue every 5 minutes if needed for chest pain. (Patient not taking: Reported on 8/19/2024) 15 tablet 3 More than a month        Last Recorded Vitals  Blood pressure 118/54, pulse 59, temperature 36.4 °C (97.5 °F), temperature source Temporal, resp. rate 18, height 1.499 m (4' 11\"), weight 72.7 kg (160 lb 4.4 oz), SpO2 95%.    Physical Exam  Constitutional:       General: She is not in acute distress.     Appearance: She is not ill-appearing.      Comments: Awake alert and oriented x3   HENT:      Mouth/Throat:      Pharynx: Oropharynx is clear.   Eyes:      Pupils: Pupils are equal, round, and reactive to light.   Cardiovascular:      Rate and Rhythm: Normal rate and regular rhythm.      Heart sounds: Normal heart sounds.   Pulmonary:      Effort: No respiratory distress.      Breath sounds: Normal breath sounds. No wheezing or rhonchi.   Abdominal:      General: Abdomen is flat. Bowel sounds are normal. There is no distension.      Palpations: Abdomen is soft.      Tenderness: There is no abdominal tenderness.   Musculoskeletal:         General: No swelling.   Skin:     General: Skin is warm.   Neurological:      General: No focal deficit present.   Psychiatric:         Mood and Affect: Mood normal.         Behavior: Behavior normal.         Thought Content: Thought content normal.         Judgment: Judgment normal.           Relevant Results  Results for orders placed or performed during the hospital encounter of 08/19/24 (from the past 24 hour(s))   Lactate   Result Value Ref Range    Lactate 1.1 0.4 - 2.0 mmol/L "   Lipase   Result Value Ref Range    Lipase 158 (H) 9 - 82 U/L   Comprehensive Metabolic Panel   Result Value Ref Range    Glucose 127 (H) 74 - 99 mg/dL    Sodium 137 136 - 145 mmol/L    Potassium 5.5 (H) 3.5 - 5.3 mmol/L    Chloride 106 98 - 107 mmol/L    Bicarbonate 27 21 - 32 mmol/L    Anion Gap 10 10 - 20 mmol/L    Urea Nitrogen 33 (H) 6 - 23 mg/dL    Creatinine 1.12 (H) 0.50 - 1.05 mg/dL    eGFR 50 (L) >60 mL/min/1.73m*2    Calcium 9.4 8.6 - 10.3 mg/dL    Albumin 4.1 3.4 - 5.0 g/dL    Alkaline Phosphatase 52 33 - 136 U/L    Total Protein 6.5 6.4 - 8.2 g/dL    AST 19 9 - 39 U/L    Bilirubin, Total 0.2 0.0 - 1.2 mg/dL    ALT 21 7 - 45 U/L   CBC and Auto Differential   Result Value Ref Range    WBC 12.5 (H) 4.4 - 11.3 x10*3/uL    nRBC 0.0 0.0 - 0.0 /100 WBCs    RBC 3.45 (L) 4.00 - 5.20 x10*6/uL    Hemoglobin 11.5 (L) 12.0 - 16.0 g/dL    Hematocrit 35.7 (L) 36.0 - 46.0 %     (H) 80 - 100 fL    MCH 33.3 26.0 - 34.0 pg    MCHC 32.2 32.0 - 36.0 g/dL    RDW 13.2 11.5 - 14.5 %    Platelets 291 150 - 450 x10*3/uL    Neutrophils % 46.6 40.0 - 80.0 %    Immature Granulocytes %, Automated 0.2 0.0 - 0.9 %    Lymphocytes % 39.6 13.0 - 44.0 %    Monocytes % 9.3 2.0 - 10.0 %    Eosinophils % 3.9 0.0 - 6.0 %    Basophils % 0.4 0.0 - 2.0 %    Neutrophils Absolute 5.82 (H) 1.60 - 5.50 x10*3/uL    Immature Granulocytes Absolute, Automated 0.03 0.00 - 0.50 x10*3/uL    Lymphocytes Absolute 4.95 (H) 0.80 - 3.00 x10*3/uL    Monocytes Absolute 1.16 (H) 0.05 - 0.80 x10*3/uL    Eosinophils Absolute 0.49 (H) 0.00 - 0.40 x10*3/uL    Basophils Absolute 0.05 0.00 - 0.10 x10*3/uL   Urinalysis with Reflex Culture and Microscopic   Result Value Ref Range    Color, Urine Light-Yellow Light-Yellow, Yellow, Dark-Yellow    Appearance, Urine Clear Clear    Specific Gravity, Urine 1.030 1.005 - 1.035    pH, Urine 7.0 5.0, 5.5, 6.0, 6.5, 7.0, 7.5, 8.0    Protein, Urine 10 (TRACE) NEGATIVE, 10 (TRACE), 20 (TRACE) mg/dL    Glucose, Urine Normal  Normal mg/dL    Blood, Urine NEGATIVE NEGATIVE    Ketones, Urine NEGATIVE NEGATIVE mg/dL    Bilirubin, Urine NEGATIVE NEGATIVE    Urobilinogen, Urine Normal Normal mg/dL    Nitrite, Urine NEGATIVE NEGATIVE    Leukocyte Esterase, Urine 250 Georgina/µL (A) NEGATIVE   Microscopic Only, Urine   Result Value Ref Range    WBC, Urine 6-10 (A) 1-5, NONE /HPF    RBC, Urine 1-2 NONE, 1-2, 3-5 /HPF    Squamous Epithelial Cells, Urine 1-9 (SPARSE) Reference range not established. /HPF    Bacteria, Urine 1+ (A) NONE SEEN /HPF    Mucus, Urine 1+ Reference range not established. /LPF        CT abdomen pelvis w IV contrast    Result Date: 8/19/2024  STUDY: CT Abdomen and Pelvis with IV Contrast; 08/19/2024 at 8:53 PM INDICATION: Right inguinal pain. COMPARISON: XR right hip and pelvis of same date, 08/19/24. ACCESSION NUMBER(S): [id number] ORDERING CLINICIAN: GASPER ESCOBEDO TECHNIQUE: CT of the abdomen and pelvis was performed.  Contiguous axial images were obtained at 3 mm slice thickness through the abdomen and pelvis. Coronal and sagittal reconstructions at 3 mm slice thickness were performed.  Omnipaque-350 69 mL was administered intravenously.  FINDINGS: LOWER CHEST: No cardiomegaly.  No pericardial effusion.  Lung bases are clear. There is a small hiatal hernia.  Coronary artery calcifications are a marker for coronary artery disease.  ABDOMEN:  LIVER: No hepatomegaly.  Smooth surface contour.  Normal attenuation.  BILE DUCTS: No intrahepatic or extrahepatic biliary ductal dilatation.  GALLBLADDER: The gallbladder is unremarkable. STOMACH: No abnormalities identified.  PANCREAS: No masses or ductal dilatation.  SPLEEN: No splenomegaly or focal splenic lesion.  ADRENAL GLANDS: No thickening or nodules.  KIDNEYS AND URETERS: Kidneys are normal in size and location.  No renal or ureteral calculi.  PELVIS:  BLADDER: No abnormalities identified.  REPRODUCTIVE ORGANS: Uterus is surgically absent.   BOWEL: No abnormalities  identified.  There is mild colonic stool which is greater in the right colon.  VESSELS: No abnormalities identified.  Abdominal aorta is normal in caliber.  PERITONEUM/RETROPERITONEUM/LYMPH NODES: No free fluid.  No pneumoperitoneum. No lymphadenopathy.  ABDOMINAL WALL: No abnormalities identified. SOFT TISSUES: There is fluid collection along the right iliopsoas muscle (image 90 through 121) with asymmetric enlargement.  This fluid collection measures approximately 7.8 cm in craniocaudal dimension and appears within two loculated components.  The medial portion measures 3.0 x 2.1 cm and the lateral portion measures 2.6 x 2.0 cm.  BONES: No acute fracture or aggressive osseous lesion.  There is disc space narrowing and endplate degenerative changes of the lumbar spine. There is vacuum disc phenomenon.  There is also 7 mm anterolisthesis L5 on S1.  There is leftward convexity of the lower thoracic and upper lumbar spine.    Asymmetric enlargement of the right iliopsoas muscle with two loculated fluid collections extending along the right iliac muscle. This may be associated with iliopsoas bursitis.  Differential diagnosis includes infection/abscess and hemorrhage.  This would be better evaluated with MRI. Small hiatal hernia. Signed by Merrick Marmolejo, DO    XR hip right with pelvis when performed 2 or 3 views    Result Date: 8/19/2024  STUDY: Pelvis and Right Hip Radiographs; 08/19/224 4:44PM INDICATION: The patient is having right hip pain and has a history of right total hip replacement. COMPARISON: None Available. ACCESSION NUMBER(S): [id number] ORDERING CLINICIAN: GASPER ESCOBEDO TECHNIQUE:  AP view of the pelvis and two view(s) of the right hip. FINDINGS:  PELVIS:  The pelvic ring is intact.  There is no acute fracture. Degenerative changes of the left hip joint and lower lumbar spine. RIGHT HIP:  The total hip arthroplasty is in anatomic alignment. There is no displaced fracture.  There is no evidence  of orthopedic hardware complications.  No soft tissue abnormality is seen.    Right hip arthroplasty in anatomic alignment. Degenerative changes of the left hip joint and lower lumbar spine. No acute osseous abnormality. Signed by Emre Aguero MD       Assessment/Plan     78-year-old female with a past medical history of hypertension, atrial fibrillation, coronary artery disease s/p cardiac stent placement, left-sided sciatica, diabetes mellitus, obstructive sleep apnea, restless leg syndrome, and anemia who presented to the emergency room for right-sided groin pain. CT scan of the abdomen and pelvis showed asymmetric enlargement of the right iliopsoas muscle with 2 loculated fluid collections extending along the right iliac muscle.  Blood workup showed a mild leukocytosis, hypokalemia and elevated lipase level.  Urinalysis showed leukocyte esterase.    I will admit the patient to the inpatient medical service with vital signs monitoring.  I will continue treating the patient with meropenem and vancomycin and order an MRI of the right iliopsoas muscle to have a better understanding of the etiology of those fluid collections.  I will consult orthopedics also.  Repeat CBC and BMP in the morning.  I will hold on giving aspirin as there is concerns of development of hematoma.  And I will also hold on giving NSAIDs for now.  Regarding the hyperkalemia, I will give 1 dose of oral Kayexalate.  Continue with IV fluids normal saline at rate of 75 cc/hour  Follow-up with a urine culture final result given the possible underlying urinary tract infection.  The above antibiotics should cover the infection.  Resume home medications except for the metformin.  Low-dose insulin sliding scale  SCDs for DVT prophylaxis  Full code    (This note was generated with voice recognition software and may contain errors including spelling, grammar, syntax and misrecognition of what was dictated, that are not fully corrected)     Antonio Larios  MD [facility]

## 2024-08-20 NOTE — PROGRESS NOTES
Care Transitions: Received a call from Jena @ [facility]. Stated patient receives [employer] services. [employer]  is Davi Barriga @ [phone]. Services she receives currently are home delivered DME incontinent pads from [employer] and home delivered meals through [employer]. Update assigned TCC to [employer] services. Care team to follow for discharge planning. Natalya Vogt RN/TCC

## 2024-08-20 NOTE — PROGRESS NOTES
Quyen Rolon is a 78 y.o. female on day 1 of admission presenting with Abscess.      Subjective   Patient is sitting up in a chair, complains of right groin pain.         Objective     Last Recorded Vitals  BP (!) 142/47 (BP Location: Left arm, Patient Position: Lying)   Pulse 63   Temp 36.1 °C (97 °F) (Temporal)   Resp 18   Wt 72.7 kg (160 lb 4.4 oz)   SpO2 95%   Intake/Output last 3 Shifts:    Intake/Output Summary (Last 24 hours) at 8/20/2024 1430  Last data filed at 8/20/2024 0013  Gross per 24 hour   Intake 800 ml   Output --   Net 800 ml       Admission Weight  Weight: 71.2 kg (157 lb) (08/19/24 1611)    Daily Weight  08/19/24 : 72.7 kg (160 lb 4.4 oz)    Image Results  CT abdomen pelvis w IV contrast  Narrative: STUDY:  CT Abdomen and Pelvis with IV Contrast; 08/19/2024 at 8:53 PM  INDICATION:  Right inguinal pain.  COMPARISON:  XR right hip and pelvis of same date, 08/19/24.  ACCESSION NUMBER(S):  [id number]  ORDERING CLINICIAN:  GASPER ESCOBEDO  TECHNIQUE:  CT of the abdomen and pelvis was performed.  Contiguous axial images  were obtained at 3 mm slice thickness through the abdomen and pelvis.   Coronal and sagittal reconstructions at 3 mm slice thickness were  performed.  Omnipaque-350 69 mL was administered intravenously.    FINDINGS:  LOWER CHEST:  No cardiomegaly.  No pericardial effusion.  Lung bases are clear.   There is a small hiatal hernia.  Coronary artery calcifications are a  marker for coronary artery disease.     ABDOMEN:     LIVER:  No hepatomegaly.  Smooth surface contour.  Normal attenuation.     BILE DUCTS:  No intrahepatic or extrahepatic biliary ductal dilatation.     GALLBLADDER:  The gallbladder is unremarkable.  STOMACH:  No abnormalities identified.     PANCREAS:  No masses or ductal dilatation.     SPLEEN:  No splenomegaly or focal splenic lesion.     ADRENAL GLANDS:  No thickening or nodules.     KIDNEYS AND URETERS:  Kidneys are normal in size and location.  No  renal or ureteral  calculi.     PELVIS:     BLADDER:  No abnormalities identified.     REPRODUCTIVE ORGANS:  Uterus is surgically absent.       BOWEL:  No abnormalities identified.  There is mild colonic stool which is  greater in the right colon.     VESSELS:  No abnormalities identified.  Abdominal aorta is normal in caliber.      PERITONEUM/RETROPERITONEUM/LYMPH NODES:  No free fluid.  No pneumoperitoneum.  No lymphadenopathy.     ABDOMINAL WALL:  No abnormalities identified.  SOFT TISSUES:   There is fluid collection along the right iliopsoas muscle (image 90  through 121) with asymmetric enlargement.  This fluid collection  measures approximately 7.8 cm in craniocaudal dimension and appears  within two loculated components.  The medial portion measures 3.0 x  2.1 cm and the lateral portion measures 2.6 x 2.0 cm.     BONES:  No acute fracture or aggressive osseous lesion.  There is disc space  narrowing and endplate degenerative changes of the lumbar spine.   There is vacuum disc phenomenon.  There is also 7 mm anterolisthesis  L5 on S1.  There is leftward convexity of the lower thoracic and upper  lumbar spine.  Impression: Asymmetric enlargement of the right iliopsoas muscle with two  loculated fluid collections extending along the right iliac muscle.   This may be associated with iliopsoas bursitis.  Differential  diagnosis includes infection/abscess and hemorrhage.  This would be  better evaluated with MRI.  Small hiatal hernia.  Signed by Merrick Marmolejo, DO  XR hip right with pelvis when performed 2 or 3 views  Narrative: STUDY:  Pelvis and Right Hip Radiographs; 08/19/224 4:44PM  INDICATION:  The patient is having right hip pain and has a history of right total  hip replacement.  COMPARISON:  None Available.  ACCESSION NUMBER(S):  [id number]  ORDERING CLINICIAN:  GASPER ESCOBEDO  TECHNIQUE:  AP view of the pelvis and two view(s) of the right hip.  FINDINGS:    PELVIS:  The pelvic ring is intact.   There is no acute fracture.  Degenerative changes of the left hip joint and lower lumbar spine.  RIGHT HIP:  The total hip arthroplasty is in anatomic alignment.   There is no displaced fracture.  There is no evidence of orthopedic  hardware complications.  No soft tissue abnormality is seen.  Impression: Right hip arthroplasty in anatomic alignment.  Degenerative changes of the left hip joint and lower lumbar spine.  No acute osseous abnormality.  Signed by Emre Aguero MD      Physical Exam  Constitutional:       Appearance: She is ill-appearing.   HENT:      Head: Normocephalic and atraumatic.      Right Ear: External ear normal.      Left Ear: External ear normal.      Nose: Nose normal.      Mouth/Throat:      Mouth: Mucous membranes are moist.   Eyes:      Extraocular Movements: Extraocular movements intact.      Conjunctiva/sclera: Conjunctivae normal.      Pupils: Pupils are equal, round, and reactive to light.   Cardiovascular:      Rate and Rhythm: Normal rate.      Pulses: Normal pulses.   Pulmonary:      Effort: Pulmonary effort is normal.      Breath sounds: Normal breath sounds.   Abdominal:      General: Bowel sounds are normal.      Palpations: Abdomen is soft.      Comments: Right groin tender to touch   Musculoskeletal:      Cervical back: Normal range of motion and neck supple.   Skin:     General: Skin is warm and dry.      Capillary Refill: Capillary refill takes less than 2 seconds.   Neurological:      General: No focal deficit present.      Mental Status: She is alert.   Psychiatric:         Mood and Affect: Mood normal.         Behavior: Behavior normal.         Thought Content: Thought content normal.         Judgment: Judgment normal.         Relevant Results               Assessment/Plan      Scheduled medications  amLODIPine, 2.5 mg, oral, Daily  atorvastatin, 80 mg, oral, Daily  ferrous sulfate (325 mg ferrous sulfate), 1 tablet, oral, Daily with breakfast  insulin lispro, 0-10 Units,  subcutaneous, TID  losartan, 100 mg, oral, Daily  metoprolol succinate XL, 25 mg, oral, BID  piperacillin-tazobactam, 2.25 g, intravenous, q6h  rOPINIRole, 1 mg, oral, Daily  vancomycin, 1 g, intravenous, q24h      Continuous medications  sodium chloride 0.9%, 75 mL/hr, Last Rate: 75 mL/hr (08/20/24 0620)      PRN medications  PRN medications: acetaminophen **OR** acetaminophen **OR** acetaminophen, dextrose, dextrose, glucagon, glucagon, magnesium hydroxide, ondansetron ODT **OR** ondansetron, vancomycin  Results for orders placed or performed during the hospital encounter of 08/19/24 (from the past 24 hour(s))   Lactate   Result Value Ref Range    Lactate 1.1 0.4 - 2.0 mmol/L   Lipase   Result Value Ref Range    Lipase 158 (H) 9 - 82 U/L   Comprehensive Metabolic Panel   Result Value Ref Range    Glucose 127 (H) 74 - 99 mg/dL    Sodium 137 136 - 145 mmol/L    Potassium 5.5 (H) 3.5 - 5.3 mmol/L    Chloride 106 98 - 107 mmol/L    Bicarbonate 27 21 - 32 mmol/L    Anion Gap 10 10 - 20 mmol/L    Urea Nitrogen 33 (H) 6 - 23 mg/dL    Creatinine 1.12 (H) 0.50 - 1.05 mg/dL    eGFR 50 (L) >60 mL/min/1.73m*2    Calcium 9.4 8.6 - 10.3 mg/dL    Albumin 4.1 3.4 - 5.0 g/dL    Alkaline Phosphatase 52 33 - 136 U/L    Total Protein 6.5 6.4 - 8.2 g/dL    AST 19 9 - 39 U/L    Bilirubin, Total 0.2 0.0 - 1.2 mg/dL    ALT 21 7 - 45 U/L   CBC and Auto Differential   Result Value Ref Range    WBC 12.5 (H) 4.4 - 11.3 x10*3/uL    nRBC 0.0 0.0 - 0.0 /100 WBCs    RBC 3.45 (L) 4.00 - 5.20 x10*6/uL    Hemoglobin 11.5 (L) 12.0 - 16.0 g/dL    Hematocrit 35.7 (L) 36.0 - 46.0 %     (H) 80 - 100 fL    MCH 33.3 26.0 - 34.0 pg    MCHC 32.2 32.0 - 36.0 g/dL    RDW 13.2 11.5 - 14.5 %    Platelets 291 150 - 450 x10*3/uL    Neutrophils % 46.6 40.0 - 80.0 %    Immature Granulocytes %, Automated 0.2 0.0 - 0.9 %    Lymphocytes % 39.6 13.0 - 44.0 %    Monocytes % 9.3 2.0 - 10.0 %    Eosinophils % 3.9 0.0 - 6.0 %    Basophils % 0.4 0.0 - 2.0 %     Neutrophils Absolute 5.82 (H) 1.60 - 5.50 x10*3/uL    Immature Granulocytes Absolute, Automated 0.03 0.00 - 0.50 x10*3/uL    Lymphocytes Absolute 4.95 (H) 0.80 - 3.00 x10*3/uL    Monocytes Absolute 1.16 (H) 0.05 - 0.80 x10*3/uL    Eosinophils Absolute 0.49 (H) 0.00 - 0.40 x10*3/uL    Basophils Absolute 0.05 0.00 - 0.10 x10*3/uL   Urinalysis with Reflex Culture and Microscopic   Result Value Ref Range    Color, Urine Light-Yellow Light-Yellow, Yellow, Dark-Yellow    Appearance, Urine Clear Clear    Specific Gravity, Urine 1.030 1.005 - 1.035    pH, Urine 7.0 5.0, 5.5, 6.0, 6.5, 7.0, 7.5, 8.0    Protein, Urine 10 (TRACE) NEGATIVE, 10 (TRACE), 20 (TRACE) mg/dL    Glucose, Urine Normal Normal mg/dL    Blood, Urine NEGATIVE NEGATIVE    Ketones, Urine NEGATIVE NEGATIVE mg/dL    Bilirubin, Urine NEGATIVE NEGATIVE    Urobilinogen, Urine Normal Normal mg/dL    Nitrite, Urine NEGATIVE NEGATIVE    Leukocyte Esterase, Urine 250 Georgina/µL (A) NEGATIVE   Microscopic Only, Urine   Result Value Ref Range    WBC, Urine 6-10 (A) 1-5, NONE /HPF    RBC, Urine 1-2 NONE, 1-2, 3-5 /HPF    Squamous Epithelial Cells, Urine 1-9 (SPARSE) Reference range not established. /HPF    Bacteria, Urine 1+ (A) NONE SEEN /HPF    Mucus, Urine 1+ Reference range not established. /LPF   Basic metabolic panel   Result Value Ref Range    Glucose 114 (H) 74 - 99 mg/dL    Sodium 137 136 - 145 mmol/L    Potassium 4.4 3.5 - 5.3 mmol/L    Chloride 108 (H) 98 - 107 mmol/L    Bicarbonate 25 21 - 32 mmol/L    Anion Gap 8 (L) 10 - 20 mmol/L    Urea Nitrogen 24 (H) 6 - 23 mg/dL    Creatinine 1.00 0.50 - 1.05 mg/dL    eGFR 57 (L) >60 mL/min/1.73m*2    Calcium 9.0 8.6 - 10.3 mg/dL   CBC   Result Value Ref Range    WBC 10.7 4.4 - 11.3 x10*3/uL    nRBC 0.0 0.0 - 0.0 /100 WBCs    RBC 3.25 (L) 4.00 - 5.20 x10*6/uL    Hemoglobin 10.7 (L) 12.0 - 16.0 g/dL    Hematocrit 33.4 (L) 36.0 - 46.0 %     (H) 80 - 100 fL    MCH 32.9 26.0 - 34.0 pg    MCHC 32.0 32.0 - 36.0 g/dL     RDW 13.1 11.5 - 14.5 %    Platelets 258 150 - 450 x10*3/uL   POCT GLUCOSE   Result Value Ref Range    POCT Glucose 97 74 - 99 mg/dL   POCT GLUCOSE   Result Value Ref Range    POCT Glucose 112 (H) 74 - 99 mg/dL      Assessment & Plan  Abscess    78-year-old female with a past medical history of hypertension, atrial fibrillation, coronary artery disease s/p cardiac stent placement, left-sided sciatica, diabetes mellitus, obstructive sleep apnea, restless leg syndrome, and anemia who presented to the emergency room for right-sided groin pain.     right iliopsoas muscle bursitis, infection/abscess and/or hemorrhage   Hypertension  A-fib  CAD  Diabetes mellitus  Anemia  ?UTI    Plan:  -CT scan of the abdomen and pelvis showed asymmetric enlargement of the right iliopsoas muscle with 2 loculated fluid collections extending along the right iliac muscle.    -Orthopedic surgery consulted, recommendations appreciated  -MRI of pelvis ordered  -Blood culture ordered, pending results  -Urine culture pending results  -Trend H&H  -Hold on aspirin and NSAIDs  -Broad-spectrum coverage with IV Zosyn and vancomycin  -IV fluids  -Hold home dose metformin  -Humalog sliding scale  -PT/OT assessment    SCDs for DVT prophylaxis    Full code    Discharge disposition: Based on PT/OT assessment                       PASQUALE Waller-CNP

## 2024-08-20 NOTE — ED PROVIDER NOTES
Emergency Medicine Transition of Care Note.    I received Quyen Rolon in signout from Dr. Mcclure.  Please see the previous ED provider note for all HPI, PE and MDM up to the time of signout at 7 PM. This is in addition to the primary record.    In brief Quyen Rolon is an 78 y.o. female presenting for   Chief Complaint   Patient presents with    Groin Pain     Right groin pain for 3 weeks. Pt denies recent history of injury. Tylenol not helping her pain     At the time of signout we were awaiting:'s alts on CT scan    Diagnoses as of 08/19/24 2302   Abscess       Medical Decision Making  Patient was checked out to me pending the results of the abdominal CT scan. 78-year-old female with progressive right groin pain for the past 3 weeks. Tylenol was no longer helping so she came to the emergency department. She has enlargement of the right iliopsoas muscle with 2 loculated fluid collections concerning for infection/abscess versus hemorrhage. She does have a white count so I am thinking more abscess. Radiology recommended an MRI to better evaluate. I spoke to Dr. Locke and okay to admit here and get the MRI tomorrow. I am ordering vancomycin and Zosyn and will admit to the hospitalist service.        Final diagnoses:   None           Procedure  Procedures    MD Mikal Ortega MD  08/19/24 2255

## 2024-08-20 NOTE — TREATMENT PLAN
MRI of pelvis with and without IV contrast reviewed and collaborated with Dr. Locke.  Impression, exam and findings consistent with right iliopsoas bursitis.  Recommend treatment with short course of oral NSAIDs and follow-up with orthopedics in 7 to 10 days.    Please contact orthopedics directly for any concerns or questions.

## 2024-08-20 NOTE — CARE PLAN
The patient's goals for the shift include Pain control    The clinical goals for the shift include Pain control    Over the shift, the patient's pain was controlled with positioning.  Pt denied the need for any pain medication.  Used call light appropriately

## 2024-08-20 NOTE — PROGRESS NOTES
08/20/24 1534   Discharge Planning   Living Arrangements Alone   Support Systems Children   Assistance Needed none   Type of Residence Private residence;Other (Comment)  (apartment)   Number of Stairs to Enter Residence 0   Number of Stairs Within Residence 0   Do you have animals or pets at home? No   Who is requesting discharge planning? Provider   Home or Post Acute Services None   Expected Discharge Disposition Home   Does the patient need discharge transport arranged? No   Financial Resource Strain   How hard is it for you to pay for the very basics like food, housing, medical care, and heating? Not hard   Housing Stability   In the last 12 months, was there a time when you were not able to pay the mortgage or rent on time? N   In the past 12 months, how many times have you moved where you were living? 0   At any time in the past 12 months, were you homeless or living in a shelter (including now)? N   Transportation Needs   In the past 12 months, has lack of transportation kept you from medical appointments or from getting medications? no   In the past 12 months, has lack of transportation kept you from meetings, work, or from getting things needed for daily living? No     Care Transitions: Spoke with pt and verified demographics and emergency contact information. Confirmed PCP is DR Jarvis last visit was June and [facility] are preferred pharmacies. Pt denies any issues obtaining or affording medications. Pt uses C-pap, rollator, cane, shower chair, grab bars, lift chair and glucometer. Pt checks blood sugar every morning. Pt is on [employer] with BR[facility] Escalera. She receives C-pap and incontinence supplies as well as simply easy meals. She denies need for additional assistance and plans to return home alone at discharge. [facility] 20/20. ADOD is 8/22. CT to follow. Noemi Temple BSN/RN-TCC

## 2024-08-20 NOTE — CONSULTS
Vancomycin Dosing by Pharmacy- INITIAL    Quyen Rolon is a 78 y.o. year old female who Pharmacy has been consulted for vancomycin dosing for cellulitis, skin and soft tissue. Based on the patient's indication and renal status this patient will be dosed based on a goal AUC of 400-600.     Renal function is currently stable.    Visit Vitals  /54 (BP Location: Left arm, Patient Position: Lying)   Pulse 59   Temp 36.4 °C (97.5 °F) (Temporal)   Resp 18        Lab Results   Component Value Date    CREATININE 1.12 (H) 2024    CREATININE 0.87 10/31/2022        Patient weight is as follows:   Vitals:    24 2342   Weight: 72.7 kg (160 lb 4.4 oz)       Cultures:  No results found for the encounter in last 14 days.        No intake/output data recorded.  I/O during current shift:  I/O this shift:  In: 800 [IV Piggyback:800]  Out: -     Temp (24hrs), Av.3 °C (97.3 °F), Min:35.8 °C (96.5 °F), Max:36.7 °C (98 °F)         Assessment/Plan     Patient has already been given a loading dose of 1000 mg.  Will initiate vancomycin maintenance,  1000 mg every 24 hours.    This dosing regimen is predicted by [employer] to result in the following pharmacokinetic parameters:  Regimen: 1000 mg IV every 24 hours.  Start time: 23:00 on 2024  Exposure target: AUC24 (range)400-600 mg/L.hr   AUC24,ss: 457 mg/L.hr  Probability of AUC24 > 400: 66 %  Ctrough,ss: 14.2 mg/L  Probability of Ctrough,ss > 20: 19 %  Probability of nephrotoxicity (Lodise GIL ): 9 %    Follow-up level will be ordered on  at 0500 unless clinically indicated sooner.  Will continue to monitor renal function daily while on vancomycin and order serum creatinine at least every 48 hours if not already ordered.  Follow for continued vancomycin needs, clinical response, and signs/symptoms of toxicity.        VALERIE Friday Bob De Leon

## 2024-08-20 NOTE — CARE PLAN
The patient's goals for the shift include Pain control    The clinical goals for the shift include Pain control    Problem: Pain - Adult  Goal: Verbalizes/displays adequate comfort level or baseline comfort level  Outcome: Progressing     Problem: Safety - Adult  Goal: Free from fall injury  Outcome: Progressing     Problem: Discharge Planning  Goal: Discharge to home or other facility with appropriate resources  Outcome: Progressing     Problem: Chronic Conditions and Co-morbidities  Goal: Patient's chronic conditions and co-morbidity symptoms are monitored and maintained or improved  Outcome: Progressing     Problem: Fall/Injury  Goal: Not fall by end of shift  Outcome: Progressing  Goal: Be free from injury by end of the shift  Outcome: Progressing  Goal: Verbalize understanding of personal risk factors for fall in the hospital  Outcome: Progressing  Goal: Verbalize understanding of risk factor reduction measures to prevent injury from fall in the home  Outcome: Progressing  Goal: Use assistive devices by end of the shift  Outcome: Progressing  Goal: Pace activities to prevent fatigue by end of the shift  Outcome: Progressing     Problem: Pain  Goal: Turns in bed with improved pain control throughout the shift  Outcome: Progressing  Goal: Walks with improved pain control throughout the shift  Outcome: Progressing  Goal: Performs ADL's with improved pain control throughout shift  Outcome: Progressing  Goal: Participates in PT with improved pain control throughout the shift  Outcome: Progressing  Goal: Free from opioid side effects throughout the shift  Outcome: Progressing  Goal: Free from acute confusion related to pain meds throughout the shift  Outcome: Progressing

## 2024-08-20 NOTE — CONSULTS
Reason For Consult  Right hip pain for 3 weeks    History Of Present Illness  Quyen Rolon is a 78 y.o. female presenting with progressive right hip pain to anterior groin region over the last 3 weeks.  No injury, denies any fever or chills.  No similar prior episodes in the past.  No relief with activity modification and Tylenol at home.  Limiting activity and ability to perform ADLs, presented to ED for evaluation last night.     Past Medical History  She has a past medical history of CAD (coronary artery disease), Diabetes mellitus (Multi), High cholesterol, Hypertension, Restless leg, and Uterine cancer (Multi).    Surgical History  She has a past surgical history that includes Eye surgery (Bilateral); Appendectomy; Neck surgery; Tubal ligation; Hysterectomy; Foot surgery (Left); Total knee arthroplasty (Bilateral); Revision total hip arthroplasty (Right); and Carotid stent.     Social History  She reports that she has never smoked. She has never been exposed to tobacco smoke. She has never used smokeless tobacco. She reports that she does not drink alcohol and does not use drugs.    Family History  Family History   Problem Relation Name Age of Onset    Heart attack Mother      Hypertension Mother      Diabetes Mother      Cervical cancer Sister      Cancer Brother 3 brothers 1 alive     Prostate cancer Brother 3 brothers 1 alive         Allergies  Morphine, Ampicillin, Guaifenesin, Iodinated contrast media, Meperidine, Pollen extracts, Propoxyphene n-acetaminophen, Ceftriaxone, and Latex    Review of Systems  Review of Systems   Constitutional: Negative.    HENT: Negative.     Respiratory: Negative.     Cardiovascular: Negative.    Endocrine: Negative.    Musculoskeletal:  Positive for arthralgias.   Skin: Negative.    Neurological: Negative.    Hematological: Negative.    Psychiatric/Behavioral: Negative.           Physical Exam  Hip Musculoskeletal Exam  Gait    Limp: right    Inspection    Right         "Right hip inspection is normal.    Palpation    Right      Increased warmth: none      Tenderness: present (Anterior groin)        Greater trochanteric region pain: none        Lower lumbar region pain: none    Range of Motion    Range of motion additional comments: Symptom aggravation with hip flexion in seated position  Full ROM of distal joints with no sx aggravation    Neurovascular    Right        Right hip neurovascular exam is normal.        Sensation: tibial      Pulses - DP: normal      Dorsalis pedis: 2+    Neurovascular additional comments: Distal motor and sensory intact, cap refill at 2 seconds.    Special Tests    Right      Log roll test: positive      External rotation: positive    Special tests additional comments: + Hardy test           Last Recorded Vitals  Blood pressure (!) 142/47, pulse 63, temperature 36.1 °C (97 °F), temperature source Temporal, resp. rate 18, height 1.499 m (4' 11\"), weight 72.7 kg (160 lb 4.4 oz), SpO2 95%.    Relevant Results  === 08/19/24 ===    XR HIP RIGHT WITH PELVIS WHEN PERFORMED 2 OR 3 VIEWS    - Impression -  Right hip arthroplasty in anatomic alignment.  Degenerative changes of the left hip joint and lower lumbar spine.  No acute osseous abnormality.  Signed by Emre Aguero MD       === 08/19/24 ===    CT ABDOMEN PELVIS W IV CONTRAST    - Impression -  Asymmetric enlargement of the right iliopsoas muscle with two  loculated fluid collections extending along the right iliac muscle.  This may be associated with iliopsoas bursitis.  Differential  diagnosis includes infection/abscess and hemorrhage.  This would be  better evaluated with MRI.  Small hiatal hernia.  Signed by Merrick Marmolejo DO      Scheduled medications  amLODIPine, 2.5 mg, oral, Daily  atorvastatin, 80 mg, oral, Daily  ferrous sulfate (325 mg ferrous sulfate), 1 tablet, oral, Daily with breakfast  insulin lispro, 0-10 Units, subcutaneous, TID  losartan, 100 mg, oral, Daily  meropenem, 2 g, " intravenous, q12h  metoprolol succinate XL, 25 mg, oral, BID  rOPINIRole, 1 mg, oral, Daily  sodium polystyrene, 15 g, oral, Once  vancomycin, 1 g, intravenous, q24h      Continuous medications  sodium chloride 0.9%, 75 mL/hr, Last Rate: 75 mL/hr (08/20/24 0620)      PRN medications  PRN medications: acetaminophen **OR** acetaminophen **OR** acetaminophen, dextrose, dextrose, glucagon, glucagon, magnesium hydroxide, ondansetron ODT **OR** ondansetron, vancomycin  Results for orders placed or performed during the hospital encounter of 08/19/24 (from the past 24 hour(s))   Lactate   Result Value Ref Range    Lactate 1.1 0.4 - 2.0 mmol/L   Lipase   Result Value Ref Range    Lipase 158 (H) 9 - 82 U/L   Comprehensive Metabolic Panel   Result Value Ref Range    Glucose 127 (H) 74 - 99 mg/dL    Sodium 137 136 - 145 mmol/L    Potassium 5.5 (H) 3.5 - 5.3 mmol/L    Chloride 106 98 - 107 mmol/L    Bicarbonate 27 21 - 32 mmol/L    Anion Gap 10 10 - 20 mmol/L    Urea Nitrogen 33 (H) 6 - 23 mg/dL    Creatinine 1.12 (H) 0.50 - 1.05 mg/dL    eGFR 50 (L) >60 mL/min/1.73m*2    Calcium 9.4 8.6 - 10.3 mg/dL    Albumin 4.1 3.4 - 5.0 g/dL    Alkaline Phosphatase 52 33 - 136 U/L    Total Protein 6.5 6.4 - 8.2 g/dL    AST 19 9 - 39 U/L    Bilirubin, Total 0.2 0.0 - 1.2 mg/dL    ALT 21 7 - 45 U/L   CBC and Auto Differential   Result Value Ref Range    WBC 12.5 (H) 4.4 - 11.3 x10*3/uL    nRBC 0.0 0.0 - 0.0 /100 WBCs    RBC 3.45 (L) 4.00 - 5.20 x10*6/uL    Hemoglobin 11.5 (L) 12.0 - 16.0 g/dL    Hematocrit 35.7 (L) 36.0 - 46.0 %     (H) 80 - 100 fL    MCH 33.3 26.0 - 34.0 pg    MCHC 32.2 32.0 - 36.0 g/dL    RDW 13.2 11.5 - 14.5 %    Platelets 291 150 - 450 x10*3/uL    Neutrophils % 46.6 40.0 - 80.0 %    Immature Granulocytes %, Automated 0.2 0.0 - 0.9 %    Lymphocytes % 39.6 13.0 - 44.0 %    Monocytes % 9.3 2.0 - 10.0 %    Eosinophils % 3.9 0.0 - 6.0 %    Basophils % 0.4 0.0 - 2.0 %    Neutrophils Absolute 5.82 (H) 1.60 - 5.50 x10*3/uL     Immature Granulocytes Absolute, Automated 0.03 0.00 - 0.50 x10*3/uL    Lymphocytes Absolute 4.95 (H) 0.80 - 3.00 x10*3/uL    Monocytes Absolute 1.16 (H) 0.05 - 0.80 x10*3/uL    Eosinophils Absolute 0.49 (H) 0.00 - 0.40 x10*3/uL    Basophils Absolute 0.05 0.00 - 0.10 x10*3/uL   Urinalysis with Reflex Culture and Microscopic   Result Value Ref Range    Color, Urine Light-Yellow Light-Yellow, Yellow, Dark-Yellow    Appearance, Urine Clear Clear    Specific Gravity, Urine 1.030 1.005 - 1.035    pH, Urine 7.0 5.0, 5.5, 6.0, 6.5, 7.0, 7.5, 8.0    Protein, Urine 10 (TRACE) NEGATIVE, 10 (TRACE), 20 (TRACE) mg/dL    Glucose, Urine Normal Normal mg/dL    Blood, Urine NEGATIVE NEGATIVE    Ketones, Urine NEGATIVE NEGATIVE mg/dL    Bilirubin, Urine NEGATIVE NEGATIVE    Urobilinogen, Urine Normal Normal mg/dL    Nitrite, Urine NEGATIVE NEGATIVE    Leukocyte Esterase, Urine 250 Georgina/µL (A) NEGATIVE   Microscopic Only, Urine   Result Value Ref Range    WBC, Urine 6-10 (A) 1-5, NONE /HPF    RBC, Urine 1-2 NONE, 1-2, 3-5 /HPF    Squamous Epithelial Cells, Urine 1-9 (SPARSE) Reference range not established. /HPF    Bacteria, Urine 1+ (A) NONE SEEN /HPF    Mucus, Urine 1+ Reference range not established. /LPF   Basic metabolic panel   Result Value Ref Range    Glucose 114 (H) 74 - 99 mg/dL    Sodium 137 136 - 145 mmol/L    Potassium 4.4 3.5 - 5.3 mmol/L    Chloride 108 (H) 98 - 107 mmol/L    Bicarbonate 25 21 - 32 mmol/L    Anion Gap 8 (L) 10 - 20 mmol/L    Urea Nitrogen 24 (H) 6 - 23 mg/dL    Creatinine 1.00 0.50 - 1.05 mg/dL    eGFR 57 (L) >60 mL/min/1.73m*2    Calcium 9.0 8.6 - 10.3 mg/dL   CBC   Result Value Ref Range    WBC 10.7 4.4 - 11.3 x10*3/uL    nRBC 0.0 0.0 - 0.0 /100 WBCs    RBC 3.25 (L) 4.00 - 5.20 x10*6/uL    Hemoglobin 10.7 (L) 12.0 - 16.0 g/dL    Hematocrit 33.4 (L) 36.0 - 46.0 %     (H) 80 - 100 fL    MCH 32.9 26.0 - 34.0 pg    MCHC 32.0 32.0 - 36.0 g/dL    RDW 13.1 11.5 - 14.5 %    Platelets 258 150 - 450  x10*3/uL   POCT GLUCOSE   Result Value Ref Range    POCT Glucose 97 74 - 99 mg/dL        Assessment/Plan     Activity as tolerated, pain control  MRI ordered for this afternoon, will monitor results for treatment plan  Ortho to follow-up after MRI results received    PASQUALE Mcakey-CNP

## 2024-08-21 VITALS
HEART RATE: 52 BPM | TEMPERATURE: 97.8 F | DIASTOLIC BLOOD PRESSURE: 78 MMHG | RESPIRATION RATE: 18 BRPM | HEIGHT: 59 IN | BODY MASS INDEX: 32.31 KG/M2 | OXYGEN SATURATION: 96 % | WEIGHT: 160.27 LBS | SYSTOLIC BLOOD PRESSURE: 143 MMHG

## 2024-08-21 LAB
ANION GAP SERPL CALC-SCNC: 9 MMOL/L (ref 10–20)
BACTERIA UR CULT: NORMAL
BUN SERPL-MCNC: 21 MG/DL (ref 6–23)
CALCIUM SERPL-MCNC: 9 MG/DL (ref 8.6–10.3)
CHLORIDE SERPL-SCNC: 108 MMOL/L (ref 98–107)
CO2 SERPL-SCNC: 27 MMOL/L (ref 21–32)
CREAT SERPL-MCNC: 1.06 MG/DL (ref 0.5–1.05)
EGFRCR SERPLBLD CKD-EPI 2021: 54 ML/MIN/1.73M*2
ERYTHROCYTE [DISTWIDTH] IN BLOOD BY AUTOMATED COUNT: 12.9 % (ref 11.5–14.5)
GLUCOSE BLD MANUAL STRIP-MCNC: 119 MG/DL (ref 74–99)
GLUCOSE BLD MANUAL STRIP-MCNC: 182 MG/DL (ref 74–99)
GLUCOSE SERPL-MCNC: 105 MG/DL (ref 74–99)
HCT VFR BLD AUTO: 33.4 % (ref 36–46)
HGB BLD-MCNC: 10.7 G/DL (ref 12–16)
LIPASE SERPL-CCNC: 23 U/L (ref 9–82)
MCH RBC QN AUTO: 33.1 PG (ref 26–34)
MCHC RBC AUTO-ENTMCNC: 32 G/DL (ref 32–36)
MCV RBC AUTO: 103 FL (ref 80–100)
NRBC BLD-RTO: 0 /100 WBCS (ref 0–0)
PLATELET # BLD AUTO: 272 X10*3/UL (ref 150–450)
POTASSIUM SERPL-SCNC: 4.2 MMOL/L (ref 3.5–5.3)
RBC # BLD AUTO: 3.23 X10*6/UL (ref 4–5.2)
SODIUM SERPL-SCNC: 140 MMOL/L (ref 136–145)
VANCOMYCIN TROUGH SERPL-MCNC: 14.2 UG/ML (ref 5–20)
WBC # BLD AUTO: 9.9 X10*3/UL (ref 4.4–11.3)

## 2024-08-21 PROCEDURE — 2500000001 HC RX 250 WO HCPCS SELF ADMINISTERED DRUGS (ALT 637 FOR MEDICARE OP): Performed by: INTERNAL MEDICINE

## 2024-08-21 PROCEDURE — 2500000004 HC RX 250 GENERAL PHARMACY W/ HCPCS (ALT 636 FOR OP/ED)

## 2024-08-21 PROCEDURE — 80202 ASSAY OF VANCOMYCIN: CPT | Performed by: INTERNAL MEDICINE

## 2024-08-21 PROCEDURE — 97165 OT EVAL LOW COMPLEX 30 MIN: CPT | Mod: GO

## 2024-08-21 PROCEDURE — 82947 ASSAY GLUCOSE BLOOD QUANT: CPT

## 2024-08-21 PROCEDURE — 94660 CPAP INITIATION&MGMT: CPT

## 2024-08-21 PROCEDURE — 85027 COMPLETE CBC AUTOMATED: CPT

## 2024-08-21 PROCEDURE — 2500000001 HC RX 250 WO HCPCS SELF ADMINISTERED DRUGS (ALT 637 FOR MEDICARE OP)

## 2024-08-21 PROCEDURE — 83690 ASSAY OF LIPASE: CPT

## 2024-08-21 PROCEDURE — 97161 PT EVAL LOW COMPLEX 20 MIN: CPT | Mod: GP

## 2024-08-21 PROCEDURE — 36415 COLL VENOUS BLD VENIPUNCTURE: CPT

## 2024-08-21 PROCEDURE — 80048 BASIC METABOLIC PNL TOTAL CA: CPT

## 2024-08-21 RX ORDER — CEFTRIAXONE 1 G/50ML
1 INJECTION, SOLUTION INTRAVENOUS EVERY 24 HOURS
Status: DISCONTINUED | OUTPATIENT
Start: 2024-08-21 | End: 2024-08-21

## 2024-08-21 RX ORDER — IBUPROFEN 600 MG/1
600 TABLET ORAL
Qty: 21 TABLET | Refills: 0 | Status: SHIPPED | OUTPATIENT
Start: 2024-08-21 | End: 2024-08-28

## 2024-08-21 ASSESSMENT — ACTIVITIES OF DAILY LIVING (ADL)
BATHING_ASSISTANCE: STAND BY
LACK_OF_TRANSPORTATION: NO
ADL_ASSISTANCE: INDEPENDENT
ADL_ASSISTANCE: INDEPENDENT

## 2024-08-21 ASSESSMENT — COGNITIVE AND FUNCTIONAL STATUS - GENERAL
DRESSING REGULAR UPPER BODY CLOTHING: A LITTLE
WALKING IN HOSPITAL ROOM: A LITTLE
DRESSING REGULAR LOWER BODY CLOTHING: A LITTLE
MOBILITY SCORE: 22
HELP NEEDED FOR BATHING: A LITTLE
DAILY ACTIVITIY SCORE: 20
TOILETING: A LITTLE
CLIMB 3 TO 5 STEPS WITH RAILING: A LITTLE

## 2024-08-21 ASSESSMENT — PAIN SCALES - GENERAL
PAINLEVEL_OUTOF10: 4
PAINLEVEL_OUTOF10: 4

## 2024-08-21 ASSESSMENT — PAIN - FUNCTIONAL ASSESSMENT
PAIN_FUNCTIONAL_ASSESSMENT: 0-10
PAIN_FUNCTIONAL_ASSESSMENT: 0-10

## 2024-08-21 NOTE — PROGRESS NOTES
Physical Therapy    Physical Therapy Evaluation    Patient Name: Quyen Rolon  MRN: [id number]  Today's Date: 8/21/2024   Time Calculation  Start Time: 1023  Stop Time: 1036  Time Calculation (min): 13 min  308/308-A    Assessment/Plan   PT Assessment  End of Session Communication: Bedside nurse  Assessment Comment: Pt does not meet requirements for acute PT at this time. She may benefit from outpatient PT to address R hip pain.  End of Session Patient Position: Up in chair, Alarm on  IP OR SWING BED PT PLAN  Inpatient or Swing Bed: Inpatient  PT Plan  PT Plan: PT Eval only  PT Eval Only Reason: No acute PT needs identified  PT Frequency: PT eval only  PT Discharge Recommendations: No further acute PT (Could benefit from outpatient PT to address R hip pain.)  PT Recommended Transfer Status: Stand by assist, Assistive device  PT - OK to Discharge: Yes (PT eval complete, ok to d/c once deemed medically appropriate.)    Subjective     Current Problem:  1. Abscess        2. Bursitis, unspecified site  ibuprofen 600 mg tablet        Patient Active Problem List   Diagnosis    Urinary incontinence    Malignant neoplasm of corpus uteri, except isthmus (Multi)    Type 2 diabetes mellitus without complication, without long-term current use of insulin (Multi)    Acute left-sided low back pain without sciatica    Bladder prolapse, female, acquired    History of coronary artery stent placement    Hypertension    Abscess       General Visit Information:  General  Reason for Referral: impaired mobility  Referred By: Florentin  Past Medical History Relevant to Rehab: HLD, HTN, CAD, DM, R ONEYDA, BL TKA, Restless leg, uterine cancer  Co-Treatment: OT  Co-Treatment Reason: Maximize pt safety  Prior to Session Communication: Bedside nurse  Patient Position Received: Up in chair, Alarm on  General Comment: Pt to ED 8/19 with complaints of R hip pain. XR R hip (-) fx. CT abdomen/pelvis possible iliopsoas bursitis. Ortho consulted -  recommend medical management for iliopsoas bursitis.    Home Living:  Home Living  Type of Home: Apartment  Lives With: Alone  Home Adaptive Equipment:  (rollator, SPC)  Home Layout: One level (laundry outside of unit, down the peguero)  Home Access: Level entry  Bathroom Shower/Tub: Walk-in shower  Bathroom Equipment: Grab bars in shower, Shower chair with back  Home Living Comments: Owns lift chair    Prior Level of Function:  Prior Function Per Pt/Caregiver Report  Level of [place]: Independent with ADLs and functional transfers, Independent with homemaking with ambulation  ADL Assistance: Independent  Homemaking Assistance: Independent  Ambulatory Assistance: Independent (with rollator for community distance. SPC in the apartment, otherwise furniture walks)  Prior Function Comments: No falls in the last 3 months. Drives.    Precautions:       Vital Signs:     Objective     Pain:  Pain Assessment  Pain Assessment: 0-10  0-10 (Numeric) Pain Score: 4  Pain Type: Acute pain  Pain Location: Hip  Pain Orientation: Right    Cognition:  Cognition  Overall Cognitive Status: Within Functional Limits    General Assessments:      Activity Tolerance  Endurance: Endurance does not limit participation in activity  Sensation  Light Touch: No apparent deficits  Strength  Strength Comments: RLE MMT hip flex 3+/5 (painful), knee ext 4/5, ankle DF 4/5; LLE MMT 4/5 overall     Coordination  Movements are Fluid and Coordinated: Yes  Postural Control  Posture Comment: Forward head/rounded shoulders. Increased kyphosis.  Static Sitting Balance  Static Sitting-Comment/Number of Minutes: Good  Dynamic Sitting Balance  Dynamic Sitting-Comments: Good  Static Standing Balance  Static Standing-Comment/Number of Minutes: Good  Dynamic Standing Balance  Dynamic Standing-Comments: Fair +    Functional Assessments:     Bed Mobility  Bed Mobility: No  Transfers  Transfer: Yes  Transfer 1  Technique 1: Sit to stand, Stand to sit  Transfer Device  1: Gait belt (FWW)  Transfer Level of Assistance 1:  (SBA)  Trials/Comments 1: Cues for hand placement with stand > sit.  Ambulation/Gait Training  Ambulation/Gait Training Performed: Yes  Ambulation/Gait Training 1  Surface 1: Level tile  Device 1: Rolling walker  Gait Support Devices: Gait belt  Assistance 1:  (SBA)  Quality of Gait 1: Decreased step length, Forward flexed posture  Comments/Distance (ft) 1: Pt ambulates ~75' with FWW and SBA. Tends to push FWW too far in front, receptive to cues to keep it close.          Extremity/Trunk Assessments:        RLE   RLE : Within Functional Limits  LLE   LLE : Within Functional Limits    Outcome Measures:     [facility] Basic Mobility  Turning from your back to your side while in a flat bed without using bedrails: None  Moving from lying on your back to sitting on the side of a flat bed without using bedrails: None  Moving to and from bed to chair (including a wheelchair): None  Standing up from a chair using your arms (e.g. wheelchair or bedside chair): None  To walk in hospital room: A little  Climbing 3-5 steps with railing: A little  Basic Mobility - Total Score: 22

## 2024-08-21 NOTE — CONSULTS
Vancomycin Dosing by Pharmacy- Cessation of Therapy    Consult to pharmacy for vancomycin dosing has been discontinued by the prescriber, pharmacy will sign off at this time.    Please call pharmacy if there are further questions or re-enter a consult if vancomycin is resumed.     Pantera Leon, [facility]

## 2024-08-21 NOTE — PROGRESS NOTES
08/21/24 1129   Discharge Planning   Living Arrangements Alone   Support Systems Children   Assistance Needed cane   Type of Residence Private residence;Other (Comment)  (apartment)   Number of Stairs to Enter Residence 0   Number of Stairs Within Residence 0   Do you have animals or pets at home? No   Who is requesting discharge planning? Provider   Home or Post Acute Services None   Expected Discharge Disposition Home   Does the patient need discharge transport arranged? No   Financial Resource Strain   How hard is it for you to pay for the very basics like food, housing, medical care, and heating? Not hard   Housing Stability   In the last 12 months, was there a time when you were not able to pay the mortgage or rent on time? N   In the past 12 months, how many times have you moved where you were living? 0   At any time in the past 12 months, were you homeless or living in a shelter (including now)? N   Transportation Needs   In the past 12 months, has lack of transportation kept you from medical appointments or from getting medications? no   In the past 12 months, has lack of transportation kept you from meetings, work, or from getting things needed for daily living? No     Care Transitions: Met with pt reviewed IMM she denied questions, signed and dated provided a copy original on chart. Confirmed discharge plan did not change and that she has a ride. [facility] 19/20 ADOD is today. CT to follow. Noemi Temple BSN/RN-TCC

## 2024-08-21 NOTE — CONSULTS
Vancomycin Dosing by Pharmacy- FOLLOW UP    Quyen Rolon is a 78 y.o. year old female who Pharmacy has been consulted for vancomycin dosing for cellulitis, skin and soft tissue. Based on the patient's indication and renal status this patient is being dosed based on a goal AUC of 400-600.     Renal function is currently stable.    Current vancomycin dose: 1000 mg given every 24 hours    Most recent random level: 14.2 mcg/mL    Visit Vitals  /87 (Patient Position: Lying)   Pulse 66   Temp 36.1 °C (96.9 °F) (Temporal)   Resp 18        Lab Results   Component Value Date    CREATININE 1.06 (H) 2024    CREATININE 1.00 2024    CREATININE 1.12 (H) 2024    CREATININE 0.87 10/31/2022        Patient weight is as follows:   Vitals:    24 2342   Weight: 72.7 kg (160 lb 4.4 oz)       Cultures:  No results found for the encounter in last 14 days.       I/O last 3 completed shifts:  In: 1440 (19.8 mL/kg) [P.O.:640; IV Piggyback:800]  Out: - (0 mL/kg)   Weight: 72.7 kg   I/O during current shift:  I/O this shift:  In: 1523.8 [P.O.:50; I.V.:1123.8; IV Piggyback:350]  Out: -     Temp (24hrs), Av.2 °C (97.1 °F), Min:36.1 °C (96.9 °F), Max:36.4 °C (97.5 °F)      Assessment/Plan    Within goal AUC range. Continue current vancomycin regimen.    This dosing regimen is predicted by [employer] to result in the following pharmacokinetic parameters:  Loading dose: 1000 mg  Regimen: 1000 mg IV every 24 hours.  Start time: 22:25 on 2024  Exposure target: AUC24 (range)400-600 mg/L.hr   AUC24,ss: 439 mg/L.hr  Probability of AUC24 > 400: 67 %  Ctrough,ss: 13.5 mg/L  Probability of Ctrough,ss > 20: 9 %  Probability of nephrotoxicity (Lodise GIL ): 9 %      The next level will be obtained on 2024 at 0500. May be obtained sooner if clinically indicated.   Will continue to monitor renal function daily while on vancomycin and order serum creatinine at least every 48 hours if not already  ordered.  Follow for continued vancomycin needs, clinical response, and signs/symptoms of toxicity.       Pantera Leon, [facility]

## 2024-08-21 NOTE — PROGRESS NOTES
Medication Education     Medication education for Quyen Rolon was provided to the patient  for the following medication(s):  All medications including new Ibuprofen.       Medication education provided by a Pharmacist:  ADR Counseling Dose, frequency, storage How to take and what to do if a dose is missed Proper dose, indication, possible ADRs How the medication works and benefits of taking it Benefits of taking the medication     Identified potential barriers to education:  None    Method(s) of Education:  Verbal Written materials provided and reviewed    An opportunity to ask questions and receive answers was provided.     Assessment of understanding the patient :  2= meets goals/outcomes      RAHUL CAMARILLO, Pharmacy Student

## 2024-08-21 NOTE — NURSING NOTE
Discharge instructions reviewed with patient. Follow up appointments and prescriptions reviewed, patient verbalized understanding. IV removed, no complications. Patient is in room awaiting arrival of ride home into private vehicle.

## 2024-08-21 NOTE — PROGRESS NOTES
Occupational Therapy    Evaluation    Patient Name: Quyen Rolon  MRN: [id number]  Today's Date: 8/21/2024  Time Calculation  Start Time: 1024  Stop Time: 1036  Time Calculation (min): 12 min  308/308-A    Assessment  IP OT Assessment  OT Assessment: pt at [facility] for ADLs.  no need for skilled OT at this time.  End of Session Communication: Care Coordinator (NP)  End of Session Patient Position: Up in chair, Alarm on    Plan:  No Skilled OT: At baseline function  OT Frequency: OT eval only  OT Discharge Recommendations: No OT needed after discharge, No further acute OT    Subjective     Current Problem:  1. Abscess        2. Bursitis, unspecified site  ibuprofen 600 mg tablet          General:  General  Reason for Referral: impaired mobility/ADL  Referred By: Florentin  Past Medical History Relevant to Rehab: HLD, HTN, CAD, DM, R ONEYDA, BL TKA, Restless leg, uterine cancer  Co-Treatment: PT  Co-Treatment Reason: Maximize pt safety  Prior to Session Communication: Bedside nurse  Patient Position Received: Up in chair, Alarm on  General Comment: Pt to ED 8/19 with complaints of R hip pain. XR R hip (-) fx. CT abdomen/pelvis possible iliopsoas bursitis. Ortho consulted - recommend medical management for iliopsoas bursitis.    Precautions: fall risk         Pain:  Pain Assessment  Pain Assessment: 0-10  0-10 (Numeric) Pain Score: 4  Pain Type: Acute pain  Pain Location: Hip  Pain Orientation: Right    Objective     Cognition:  Overall Cognitive Status: Within Functional Limits             Home Living:  Type of Home: Apartment  Lives With: Alone  Home Layout: One level  Home Access: Level entry  Bathroom Shower/Tub: Walk-in shower  Bathroom Equipment: Grab bars in shower, Shower chair with back  Home Living Comments: owns lift chair.  laundry on same floor     Prior Function:  Level of [place]: Independent with ADLs and functional transfers, Independent with homemaking with ambulation  ADL Assistance:  Independent  Homemaking Assistance: Independent  Ambulatory Assistance: Independent (with rollator for community distance. SPC in the apartment, otherwise furniture walks)  Prior Function Comments: No falls in the last 3 months. Drives.    ADL:  Eating Assistance: Independent  Grooming Assistance: Independent  Bathing Assistance: Stand by  UE Dressing Assistance: Stand by  LE Dressing Assistance: Stand by  Toileting Assistance with Device: Modified independent    Activity Tolerance:  Endurance: Endurance does not limit participation in activity    Bed Mobility/Transfers:   Bed Mobility  Bed Mobility: No  Transfer 1  Technique 1: Sit to stand, Stand to sit  Transfer Device 1: Walker, Gait belt  Transfer Level of Assistance 1: Close supervision  Trials/Comments 1: cues for hand placement    Ambulation/Gait Training:  Functional Mobility  Functional Mobility Performed: Yes  Functional Mobility 1  Surface 1: Level tile  Device 1: Rolling walker  Functional Mobility Support Devices: Gait belt  Assistance 1: Close supervision      Vision: Vision - Basic Assessment  Current Vision: No visual deficits      Sensation:  Light Touch: No apparent deficits    Strength:  Strength Comments: BUE WFL      Coordination:  Movements are Fluid and Coordinated: Yes       Outcome Measures: [facility] Daily Activity  Putting on and taking off regular lower body clothing: A little  Bathing (including washing, rinsing, drying): A little  Putting on and taking off regular upper body clothing: A little  Toileting, which includes using toilet, bedpan or urinal: A little  Taking care of personal grooming such as brushing teeth: None  Eating Meals: None  Daily Activity - Total Score: 20

## 2024-08-21 NOTE — CARE PLAN
The patient's goals for the shift include Pain control    The clinical goals for the shift include Pain less than 5/10 pain through this shift    Pt states that pain is better and pt needed no pain medication throughout shift.

## 2024-08-21 NOTE — DISCHARGE SUMMARY
Discharge Diagnosis  Right iliopsoas muscle bursitis    Issues Requiring Follow-Up  Right iliopsoas muscle bursitis    Discharge Meds     Your medication list        START taking these medications        Instructions Last Dose Given Next Dose Due   ibuprofen 600 mg tablet      Take 1 tablet (600 mg) by mouth 3 times daily (morning, midday, late afternoon) for 7 days.              CONTINUE taking these medications        Instructions Last Dose Given Next Dose Due   acetaminophen 500 mg tablet  Commonly known as: Tylenol           amLODIPine 2.5 mg tablet  Commonly known as: Norvasc      Take 1 tablet (2.5 mg) by mouth once daily.       aspirin 81 mg chewable tablet           atorvastatin 80 mg tablet  Commonly known as: Lipitor      Take 1 tablet (80 mg) by mouth once daily.       BLINK TEARS OPHT           cholecalciferol 5,000 Units tablet  Commonly known as: Vitamin D-3           ferrous sulfate (325 mg ferrous sulfate) tablet           losartan 100 mg tablet  Commonly known as: Cozaar      Take 1 tablet (100 mg) by mouth once daily.       MAGNESIUM ORAL           metFORMIN  mg 24 hr tablet  Commonly known as: Glucophage-XR      Take 2 tablets (1,000 mg) by mouth 2 times daily (morning and late afternoon).       metoprolol succinate XL 50 mg 24 hr tablet  Commonly known as: Toprol-XL      Take 0.5 tablets (25 mg) by mouth 2 times a day.       MULTIVITAMIN ORAL           rOPINIRole 1 mg tablet  Commonly known as: Requip      Take 1 tablet (1 mg) by mouth once daily.       VITAMIN B COMPLEX ORAL                  STOP taking these medications      fluticasone 50 mcg/actuation nasal spray  Commonly known as: Flonase        naproxen 500 mg tablet  Commonly known as: Naprosyn        nitroglycerin 0.4 mg SL tablet  Commonly known as: Nitrostat                  Where to Get Your Medications        These medications were sent to [facility] - [place], [place] - [address]  [address]  [facility] [address]      Phone: [phone]   ibuprofen 600 mg tablet         Test Results Pending At Discharge  Pending Labs       Order Current Status    Extra Urine Gray Tube Collected (08/19/24 2003)    Urinalysis with Reflex Culture and Microscopic In process            Hospital Course   Quyen Rolon is a 78 y.o. female  Who presented to the emergency room for a 3 weeks history of right groin pain.  On presentation, blood pressure 171/80, heart rate 67, respiratory rate 18, afebrile, saturation oxygen 96% on room air.  Pertinent findings on blood workup; WBC 12,500, hemoglobin 11.5, creatinine 1.12, BUN 33, potassium 5.5, and lipase 158.  Urinalysis came back positive for leukocyte esterase.  CT scan of the abdomen and pelvis showed asymmetric enlargement of the right iliopsoas muscle with 2 loculated fluid collections extending along the right iliac muscle.  Patient was given in the emergency room IV Dilaudid, Zofran, Zosyn, Compazine, and vancomycin and IV fluids and then admitted to the medical service for further investigation and management on August 19  She was followed here by orthopedic surgeon  She had MRI done that revealed:1. Lobulated simple right anterior hip fluid collection most likely   represents right iliopsoas bursitis. With the lack of adjacent edema,   differentials also include a postoperative seroma or less likely   particle disease. Infectious etiologies are thought less likely.   2. Severe degenerative changes of the lumbosacral spine.     She is being discharged home on scheduled ibuprofen for 7 days and will need to follow-up with orthopedic surgery in 1 week  Pertinent Physical Exam At Time of Discharge  Physical Exam  Constitutional:       Appearance: She is well-developed  HENT:      Head: Normocephalic and atraumatic.      Right Ear: External ear normal.      Left Ear: External ear normal.      Nose: Nose normal.      Mouth/Throat:      Mouth: Mucous membranes are  moist.   Eyes:      Extraocular Movements: Extraocular movements intact.      Conjunctiva/sclera: Conjunctivae normal.      Pupils: Pupils are equal, round, and reactive to light.   Cardiovascular:      Rate and Rhythm: Normal rate.      Pulses: Normal pulses.   Pulmonary:      Effort: Pulmonary effort is normal.      Breath sounds: Normal breath sounds.   Abdominal:      General: Bowel sounds are normal.      Palpations: Abdomen is soft.      Comments: Right groin tender to touch   Musculoskeletal:      Cervical back: Normal range of motion and neck supple.   Skin:     General: Skin is warm and dry.      Capillary Refill: Capillary refill takes less than 2 seconds.   Neurological:      General: No focal deficit present.      Mental Status: She is alert.   Psychiatric:         Mood and Affect: Mood normal.         Behavior: Behavior normal.         Thought Content: Thought content normal.         Judgment: Judgment normal.   Outpatient Follow-Up  Future Appointments   Date Time Provider Department Center   9/17/2024  2:00 PM Grecia Jarvis MD [id number] None     Follow-up with orthopedic surgery in 1 week    PASQUALE Waller-CNP

## 2024-08-22 ENCOUNTER — PATIENT OUTREACH (OUTPATIENT)
Age: 79
End: 2024-08-22
Payer: COMMERCIAL

## 2024-08-22 NOTE — PROGRESS NOTES
Discharge Facility:[facility]  Discharge Diagnosis:Abscess, Bursitis  Admission Date:8/19/24  Discharge Date:8/21/24    PCP Appointment Date:Task sent to office  Specialist Appointment Date: Orthopedic Surgery-TBD  Hospital Encounter and Summary Linked: Yes  See discharge assessment below for further details    Medications  Medications reviewed with patient/caregiver?: Yes (8/22/2024  9:42 AM)  Is the patient having any side effects they believe may be caused by any medication additions or changes?: No (8/22/2024  9:42 AM)  Does the patient have all medications ordered at discharge?: Yes (8/22/2024  9:42 AM)  Care Management Interventions: Provided patient education (8/22/2024  9:42 AM)  Prescription Comments: IBU (8/22/2024  9:42 AM)  Is the patient taking all medications as directed (includes completed medication regime)?: Yes (8/22/2024  9:42 AM)  Care Management Interventions: Provided patient education (8/22/2024  9:42 AM)  Medication Comments: Stop Flonase, Naproxen, Nitro (8/22/2024  9:42 AM)    Appointments  Does the patient have a primary care provider?: Yes (8/22/2024  9:42 AM)  Care Management Interventions: Advised patient to make appointment (8/22/2024  9:42 AM)  Has the patient kept scheduled appointments due by today?: Yes (8/22/2024  9:42 AM)    Self Management  Has home health visited the patient within 72 hours of discharge?: Not applicable (8/22/2024  9:42 AM)  What Durable Medical Equipment (DME) was ordered?: N/A (8/22/2024  9:42 AM)    Patient Teaching  Does the patient have access to their discharge instructions?: Yes (8/22/2024  9:42 AM)  Care Management Interventions: Reviewed instructions with patient (8/22/2024  9:42 AM)  What is the patient's perception of their health status since discharge?: Improving (8/22/2024  9:42 AM)  Is the patient/caregiver able to teach back the hierarchy of who to call/visit for symptoms/problems? PCP, Specialist, Home Health nurse, Urgent Care, ED, 911: Yes  (8/22/2024  9:42 AM)

## 2024-08-27 ENCOUNTER — APPOINTMENT (OUTPATIENT)
Age: 79
End: 2024-08-27
Payer: COMMERCIAL

## 2024-08-27 VITALS
OXYGEN SATURATION: 96 % | BODY MASS INDEX: 33.18 KG/M2 | SYSTOLIC BLOOD PRESSURE: 134 MMHG | DIASTOLIC BLOOD PRESSURE: 68 MMHG | WEIGHT: 164.6 LBS | HEART RATE: 51 BPM | HEIGHT: 59 IN

## 2024-08-27 DIAGNOSIS — M70.71 ILIOPSOAS BURSITIS OF RIGHT HIP: ICD-10-CM

## 2024-08-27 DIAGNOSIS — E11.9 TYPE 2 DIABETES MELLITUS WITHOUT COMPLICATION, WITHOUT LONG-TERM CURRENT USE OF INSULIN (MULTI): Primary | ICD-10-CM

## 2024-08-27 RX ORDER — IBUPROFEN 600 MG/1
600 TABLET ORAL 3 TIMES DAILY PRN
Qty: 60 TABLET | Refills: 1 | Status: SHIPPED | OUTPATIENT
Start: 2024-08-27

## 2024-08-27 RX ORDER — PREDNISONE 10 MG/1
TABLET ORAL
Qty: 30 TABLET | Refills: 0 | Status: SHIPPED | OUTPATIENT
Start: 2024-08-27 | End: 2024-09-08

## 2024-08-27 ASSESSMENT — ENCOUNTER SYMPTOMS
SLEEP DISTURBANCE: 0
WOUND: 0
MYALGIAS: 1
NAUSEA: 0
FATIGUE: 0
CHILLS: 0
FEVER: 0
ARTHRALGIAS: 1

## 2024-08-27 NOTE — PROGRESS NOTES
"Patient: Quyen Rolon  : 1945  PCP: Grecia Jarvis MD  MRN: [id number]  Program: Transitional Care Management  Status: Enrolled  Effective Dates: 2024 - present  Responsible Staff: Geri Santo LPN  Social Determinants to be Addressed: Physical Activity, Social Connections, Stress         Quyen Rolon is a 78 y.o. female presenting today for follow-up after being discharged from the hospital 6 days ago. The main problem requiring admission was iliopsoas bursitis. The discharge summary and/or Transitional Care Management documentation was reviewed. Medication reconciliation was performed as indicated via the \"Charly as Reviewed\" timestamp.     Quyen Rolon was contacted by Transitional Care Management services two days after her discharge. This encounter and supporting documentation was reviewed.    The ibuprofen is helping a little bit she is using it 3 times a day no troubles with her stomach.  She had used Naprosyn before, but she gets less side effects with the ibuprofen.  Last A1c was 7.1 relatively stable on the medications.    Will change the ibuprofen to 3 times a day as needed  Prednisone taper  Referral/follow-up from the hospital with [facility].  Referral done.    Review of Systems   Constitutional:  Negative for chills, fatigue and fever.   Gastrointestinal:  Negative for nausea.   Musculoskeletal:  Positive for arthralgias and myalgias.   Skin:  Negative for rash and wound.   Psychiatric/Behavioral:  Negative for sleep disturbance.        /68   Pulse 51   Ht 1.499 m (4' 11\")   Wt 74.7 kg (164 lb 9.6 oz)   SpO2 96%   BMI 33.25 kg/m²     Physical Exam  Constitutional:       Appearance: Normal appearance.   HENT:      Head: Normocephalic and atraumatic.   Skin:     General: Skin is warm and dry.   Neurological:      General: No focal deficit present.      Mental Status: She is alert and oriented to person, place, and time.   Psychiatric:       "   Mood and Affect: Mood normal.         Behavior: Behavior normal.         Thought Content: Thought content normal.         Judgment: Judgment normal.         The complexity of medical decision making for this patient's transitional care is moderate.    Assessment/Plan   Problem List Items Addressed This Visit             ICD-10-CM    Type 2 diabetes mellitus without complication, without long-term current use of insulin (Multi) - Primary E11.9    Iliopsoas bursitis of right hip M70.71    Relevant Medications    ibuprofen 600 mg tablet    predniSONE (Deltasone) 10 mg tablet    Other Relevant Orders    Referral to Orthopaedic Surgery

## 2024-09-04 ENCOUNTER — APPOINTMENT (OUTPATIENT)
Dept: ORTHOPEDIC SURGERY | Facility: CLINIC | Age: 79
End: 2024-09-04
Payer: COMMERCIAL

## 2024-09-04 DIAGNOSIS — M70.71 ILIOPSOAS BURSITIS OF RIGHT HIP: Primary | ICD-10-CM

## 2024-09-04 PROCEDURE — 1159F MED LIST DOCD IN RCRD: CPT | Performed by: NURSE PRACTITIONER

## 2024-09-04 PROCEDURE — 1125F AMNT PAIN NOTED PAIN PRSNT: CPT | Performed by: NURSE PRACTITIONER

## 2024-09-04 PROCEDURE — 1111F DSCHRG MED/CURRENT MED MERGE: CPT | Performed by: NURSE PRACTITIONER

## 2024-09-04 PROCEDURE — 99214 OFFICE O/P EST MOD 30 MIN: CPT | Performed by: NURSE PRACTITIONER

## 2024-09-04 ASSESSMENT — PAIN - FUNCTIONAL ASSESSMENT: PAIN_FUNCTIONAL_ASSESSMENT: 0-10

## 2024-09-04 ASSESSMENT — PAIN DESCRIPTION - DESCRIPTORS: DESCRIPTORS: BURNING

## 2024-09-04 ASSESSMENT — PAIN SCALES - GENERAL: PAINLEVEL_OUTOF10: 9

## 2024-09-04 NOTE — PROGRESS NOTES
Subjective    Patient ID: Quyen Rolon is a 78 y.o. female.    Chief Complaint: Pain of the Right Hip     HPI    Quyen is a pleasant 78-year-old female presenting to the office for new patient evaluation of right hip pain  Patient recently hospitalized and found to have iliopsoas bursitis  Sx slightly improved after prednisone taper, and Ibuprofen for sx control  No injuries  Agrravated with lying on L side, walking  Slight aggravated with sitting  No fever or chills  R Hip replacement 12-13 yrs ago  Bilat knee replacemetns  Denies any fever or chills  Overall slight improvement since hospitalization, unable to put on percentage scale    Review of Systems   Constitutional: Negative.    HENT: Negative.     Respiratory: Negative.     Cardiovascular: Negative.    Endocrine: Negative.    Musculoskeletal:  Positive for arthralgias and gait problem.   Skin: Negative.    Hematological: Negative.    Psychiatric/Behavioral: Negative.            Objective   Ortho Exam    Image Results:  MR MSK pelvis w and wo IV contrast  Narrative: Interpreted By:  Dashawn Arambula and Lawrence Austen   STUDY:  MRI of the pelvis with and without IV contrast;  8/20/2024 1:48 pm      INDICATION:  Signs/Symptoms:Rule out right iliopsoas muscle bleeding versus  abscess.      COMPARISON:  CT 08/19/2024.      ACCESSION NUMBER(S):  [id number]      ORDERING CLINICIAN:  SENATI WU      TECHNIQUE:  MR imaging of the musculoskeletal pelvis was obtained  with and  without administration of intravenous contrast medium.      FINDINGS:  TENDONS:  The insertions of the gluteus medius and gluteus minimus tendons are  intact bilaterally. The insertions of the iliopsoas and the origins  of the adductor tendons are intact on the left and partially obscured  by artifact on the right. The hamstring tendon origins are intact  bilaterally.      JOINTS:  Status post total right hip arthroplasty. Left hip joint is intact  with mild degenerative changes.  No left hip joint effusion. Severe  degenerative changes of the visualized lumbar spine with multiple  large disc protrusions and grade 1/2 anterolisthesis of L5-S1.      OSSEOUS STRUCTURES:  No focal marrow replacing lesions are identified. There is no  fracture.      SOFT TISSUES:  Mild atrophy of the bilateral gluteus abner, tensor fascia álvaro and  paraspinal muscles. The sciatic nerves are intact and unremarkable.  Lobulated simple fluid collection anterior to the right  acetabulum/right hip replacement measuring roughly 3.1 x 5.1 x 5.8  cm. There is thin mild enhancement at the periphery of this fluid  collection. Allowing for adjacent metallic artifact, there is no  adjacent edema or osseous involvement.      INTERNAL ORGANS:  Evaluation of the internal organs of the pelvis is limited on this  study tailored for evaluation of the musculoskeletal system. No gross  abnormality is seen in the pelvic organs. Status post hysterectomy.      Impression: 1. Lobulated simple right anterior hip fluid collection most likely  represents right iliopsoas bursitis. With the lack of adjacent edema,  differentials also include a postoperative seroma or less likely  particle disease. Infectious etiologies are thought less likely.  2. Severe degenerative changes of the lumbosacral spine.          I personally reviewed the images/study and I agree with the findings  as stated. This study was interpreted at [facility], [place], [place].      MACRO:  None      Signed by: Dashawn Arambula 8/20/2024 3:39 PM  Dictation workstation:   [id number]      Assessment/Plan   Encounter Diagnoses:  Iliopsoas bursitis of right hip

## 2024-09-05 ENCOUNTER — PATIENT OUTREACH (OUTPATIENT)
Age: 79
End: 2024-09-05
Payer: COMMERCIAL

## 2024-09-05 DIAGNOSIS — M25.551 RIGHT HIP PAIN: ICD-10-CM

## 2024-09-05 DIAGNOSIS — M70.71 ILIOPSOAS BURSITIS OF RIGHT HIP: ICD-10-CM

## 2024-09-05 ASSESSMENT — ENCOUNTER SYMPTOMS
HEMATOLOGIC/LYMPHATIC NEGATIVE: 1
ENDOCRINE NEGATIVE: 1
RESPIRATORY NEGATIVE: 1
CARDIOVASCULAR NEGATIVE: 1
ARTHRALGIAS: 1
CONSTITUTIONAL NEGATIVE: 1
PSYCHIATRIC NEGATIVE: 1

## 2024-09-05 NOTE — PROGRESS NOTES
Call regarding appt. with PCP on 8/27/24 after hospitalization.  At time of outreach call the patient feels as if their condition has improved since last visit.  Reviewed the PCP appointment with the pt and addressed any questions or concerns.

## 2024-09-06 ENCOUNTER — LAB (OUTPATIENT)
Dept: LAB | Facility: LAB | Age: 79
End: 2024-09-06
Payer: COMMERCIAL

## 2024-09-06 DIAGNOSIS — E11.59 TYPE 2 DIABETES MELLITUS WITH OTHER CIRCULATORY COMPLICATIONS (MULTI): ICD-10-CM

## 2024-09-06 DIAGNOSIS — D50.9 IRON DEFICIENCY ANEMIA, UNSPECIFIED IRON DEFICIENCY ANEMIA TYPE: ICD-10-CM

## 2024-09-06 DIAGNOSIS — Z13.220 LIPID SCREENING: ICD-10-CM

## 2024-09-06 DIAGNOSIS — E11.9 TYPE 2 DIABETES MELLITUS WITHOUT COMPLICATION, WITHOUT LONG-TERM CURRENT USE OF INSULIN (MULTI): ICD-10-CM

## 2024-09-06 DIAGNOSIS — Z95.5 HISTORY OF CORONARY ARTERY STENT PLACEMENT: ICD-10-CM

## 2024-09-06 LAB
ALBUMIN SERPL BCP-MCNC: 3.9 G/DL (ref 3.4–5)
ALP SERPL-CCNC: 55 U/L (ref 33–136)
ALT SERPL W P-5'-P-CCNC: 29 U/L (ref 7–45)
ANION GAP SERPL CALC-SCNC: 10 MMOL/L (ref 10–20)
AST SERPL W P-5'-P-CCNC: 15 U/L (ref 9–39)
BASOPHILS # BLD AUTO: 0.03 X10*3/UL (ref 0–0.1)
BASOPHILS NFR BLD AUTO: 0.2 %
BILIRUB SERPL-MCNC: 0.4 MG/DL (ref 0–1.2)
BUN SERPL-MCNC: 29 MG/DL (ref 6–23)
CALCIUM SERPL-MCNC: 9.7 MG/DL (ref 8.6–10.3)
CHLORIDE SERPL-SCNC: 102 MMOL/L (ref 98–107)
CHOLEST SERPL-MCNC: 176 MG/DL (ref 0–199)
CHOLESTEROL/HDL RATIO: 2.9
CO2 SERPL-SCNC: 30 MMOL/L (ref 21–32)
CREAT SERPL-MCNC: 1.08 MG/DL (ref 0.5–1.05)
EGFRCR SERPLBLD CKD-EPI 2021: 52 ML/MIN/1.73M*2
EOSINOPHIL # BLD AUTO: 0.26 X10*3/UL (ref 0–0.4)
EOSINOPHIL NFR BLD AUTO: 1.7 %
ERYTHROCYTE [DISTWIDTH] IN BLOOD BY AUTOMATED COUNT: 14 % (ref 11.5–14.5)
FERRITIN SERPL-MCNC: 42 NG/ML (ref 8–150)
GLUCOSE SERPL-MCNC: 169 MG/DL (ref 74–99)
HCT VFR BLD AUTO: 38.1 % (ref 36–46)
HDLC SERPL-MCNC: 61 MG/DL
HGB BLD-MCNC: 11.6 G/DL (ref 12–16)
IMM GRANULOCYTES # BLD AUTO: 0.09 X10*3/UL (ref 0–0.5)
IMM GRANULOCYTES NFR BLD AUTO: 0.6 % (ref 0–0.9)
LDLC SERPL CALC-MCNC: 82 MG/DL
LYMPHOCYTES # BLD AUTO: 6.07 X10*3/UL (ref 0.8–3)
LYMPHOCYTES NFR BLD AUTO: 40.1 %
MCH RBC QN AUTO: 32.9 PG (ref 26–34)
MCHC RBC AUTO-ENTMCNC: 30.4 G/DL (ref 32–36)
MCV RBC AUTO: 108 FL (ref 80–100)
MONOCYTES # BLD AUTO: 1.26 X10*3/UL (ref 0.05–0.8)
MONOCYTES NFR BLD AUTO: 8.3 %
NEUTROPHILS # BLD AUTO: 7.42 X10*3/UL (ref 1.6–5.5)
NEUTROPHILS NFR BLD AUTO: 49.1 %
NON HDL CHOLESTEROL: 115 MG/DL (ref 0–149)
NRBC BLD-RTO: 0 /100 WBCS (ref 0–0)
PLATELET # BLD AUTO: 328 X10*3/UL (ref 150–450)
POTASSIUM SERPL-SCNC: 4.9 MMOL/L (ref 3.5–5.3)
PROT SERPL-MCNC: 5.7 G/DL (ref 6.4–8.2)
RBC # BLD AUTO: 3.53 X10*6/UL (ref 4–5.2)
SODIUM SERPL-SCNC: 137 MMOL/L (ref 136–145)
TRIGL SERPL-MCNC: 164 MG/DL (ref 0–149)
VLDL: 33 MG/DL (ref 0–40)
WBC # BLD AUTO: 15.1 X10*3/UL (ref 4.4–11.3)

## 2024-09-06 PROCEDURE — 80053 COMPREHEN METABOLIC PANEL: CPT

## 2024-09-06 PROCEDURE — 80061 LIPID PANEL: CPT

## 2024-09-06 PROCEDURE — 36415 COLL VENOUS BLD VENIPUNCTURE: CPT

## 2024-09-06 PROCEDURE — 83036 HEMOGLOBIN GLYCOSYLATED A1C: CPT

## 2024-09-06 PROCEDURE — 85025 COMPLETE CBC W/AUTO DIFF WBC: CPT

## 2024-09-06 PROCEDURE — 82728 ASSAY OF FERRITIN: CPT

## 2024-09-07 LAB
EST. AVERAGE GLUCOSE BLD GHB EST-MCNC: 177 MG/DL
HBA1C MFR BLD: 7.8 %

## 2024-09-09 ENCOUNTER — TELEPHONE (OUTPATIENT)
Age: 79
End: 2024-09-09
Payer: COMMERCIAL

## 2024-09-09 NOTE — TELEPHONE ENCOUNTER
Rose:    Please call patient to discuss the following WBC elevated. How is she feeling? Any signs of an infection?.    Thank you,  Grecia Jarvis MD   No

## 2024-09-10 NOTE — TELEPHONE ENCOUNTER
Pt states she had an abscess and is feeling much better only taking a couple tylenol a day. She states she has no symptoms now.

## 2024-09-16 ENCOUNTER — APPOINTMENT (OUTPATIENT)
Dept: PRIMARY CARE | Facility: CLINIC | Age: 79
End: 2024-09-16
Payer: COMMERCIAL

## 2024-09-17 ENCOUNTER — APPOINTMENT (OUTPATIENT)
Age: 79
End: 2024-09-17
Payer: COMMERCIAL

## 2024-09-17 ENCOUNTER — LAB (OUTPATIENT)
Facility: LAB | Age: 79
End: 2024-09-17
Payer: COMMERCIAL

## 2024-09-17 VITALS
HEIGHT: 59 IN | HEART RATE: 59 BPM | OXYGEN SATURATION: 95 % | DIASTOLIC BLOOD PRESSURE: 76 MMHG | BODY MASS INDEX: 32.86 KG/M2 | WEIGHT: 163 LBS | SYSTOLIC BLOOD PRESSURE: 136 MMHG

## 2024-09-17 DIAGNOSIS — Z23 NEEDS FLU SHOT: ICD-10-CM

## 2024-09-17 DIAGNOSIS — I10 HYPERTENSION, UNSPECIFIED TYPE: ICD-10-CM

## 2024-09-17 DIAGNOSIS — N18.31 CHRONIC KIDNEY DISEASE, STAGE 3A (MULTI): ICD-10-CM

## 2024-09-17 DIAGNOSIS — Z71.89 GOALS OF CARE, COUNSELING/DISCUSSION: ICD-10-CM

## 2024-09-17 DIAGNOSIS — D72.829 LEUKOCYTOSIS, UNSPECIFIED TYPE: ICD-10-CM

## 2024-09-17 DIAGNOSIS — Z11.59 NEED FOR HEPATITIS C SCREENING TEST: ICD-10-CM

## 2024-09-17 DIAGNOSIS — M70.71 ILIOPSOAS BURSITIS OF RIGHT HIP: ICD-10-CM

## 2024-09-17 DIAGNOSIS — Z00.00 ENCOUNTER FOR ANNUAL WELLNESS VISIT (AWV) IN MEDICARE PATIENT: Primary | ICD-10-CM

## 2024-09-17 LAB
ALBUMIN SERPL BCP-MCNC: 4.2 G/DL (ref 3.4–5)
ALP SERPL-CCNC: 65 U/L (ref 33–136)
ALT SERPL W P-5'-P-CCNC: 24 U/L (ref 7–45)
ANION GAP SERPL CALC-SCNC: 13 MMOL/L (ref 10–20)
AST SERPL W P-5'-P-CCNC: 17 U/L (ref 9–39)
BILIRUB SERPL-MCNC: 0.4 MG/DL (ref 0–1.2)
BUN SERPL-MCNC: 25 MG/DL (ref 6–23)
CALCIUM SERPL-MCNC: 10.7 MG/DL (ref 8.6–10.3)
CHLORIDE SERPL-SCNC: 104 MMOL/L (ref 98–107)
CO2 SERPL-SCNC: 29 MMOL/L (ref 21–32)
CREAT SERPL-MCNC: 1.05 MG/DL (ref 0.5–1.05)
EGFRCR SERPLBLD CKD-EPI 2021: 54 ML/MIN/1.73M*2
ERYTHROCYTE [DISTWIDTH] IN BLOOD BY AUTOMATED COUNT: 13.4 % (ref 11.5–14.5)
GLUCOSE SERPL-MCNC: 88 MG/DL (ref 74–99)
HCT VFR BLD AUTO: 39.6 % (ref 36–46)
HGB BLD-MCNC: 12.4 G/DL (ref 12–16)
MCH RBC QN AUTO: 32.9 PG (ref 26–34)
MCHC RBC AUTO-ENTMCNC: 31.3 G/DL (ref 32–36)
MCV RBC AUTO: 105 FL (ref 80–100)
NRBC BLD-RTO: 0 /100 WBCS (ref 0–0)
PLATELET # BLD AUTO: 319 X10*3/UL (ref 150–450)
POTASSIUM SERPL-SCNC: 5.1 MMOL/L (ref 3.5–5.3)
PROT SERPL-MCNC: 6.5 G/DL (ref 6.4–8.2)
RBC # BLD AUTO: 3.77 X10*6/UL (ref 4–5.2)
SODIUM SERPL-SCNC: 141 MMOL/L (ref 136–145)
WBC # BLD AUTO: 11.2 X10*3/UL (ref 4.4–11.3)

## 2024-09-17 PROCEDURE — 1111F DSCHRG MED/CURRENT MED MERGE: CPT | Performed by: STUDENT IN AN ORGANIZED HEALTH CARE EDUCATION/TRAINING PROGRAM

## 2024-09-17 PROCEDURE — 3075F SYST BP GE 130 - 139MM HG: CPT | Performed by: STUDENT IN AN ORGANIZED HEALTH CARE EDUCATION/TRAINING PROGRAM

## 2024-09-17 PROCEDURE — 1158F ADVNC CARE PLAN TLK DOCD: CPT | Performed by: STUDENT IN AN ORGANIZED HEALTH CARE EDUCATION/TRAINING PROGRAM

## 2024-09-17 PROCEDURE — 80053 COMPREHEN METABOLIC PANEL: CPT

## 2024-09-17 PROCEDURE — 86803 HEPATITIS C AB TEST: CPT

## 2024-09-17 PROCEDURE — 85027 COMPLETE CBC AUTOMATED: CPT

## 2024-09-17 PROCEDURE — 99214 OFFICE O/P EST MOD 30 MIN: CPT | Performed by: STUDENT IN AN ORGANIZED HEALTH CARE EDUCATION/TRAINING PROGRAM

## 2024-09-17 PROCEDURE — 36415 COLL VENOUS BLD VENIPUNCTURE: CPT

## 2024-09-17 PROCEDURE — 1036F TOBACCO NON-USER: CPT | Performed by: STUDENT IN AN ORGANIZED HEALTH CARE EDUCATION/TRAINING PROGRAM

## 2024-09-17 PROCEDURE — 1123F ACP DISCUSS/DSCN MKR DOCD: CPT | Performed by: STUDENT IN AN ORGANIZED HEALTH CARE EDUCATION/TRAINING PROGRAM

## 2024-09-17 PROCEDURE — 1159F MED LIST DOCD IN RCRD: CPT | Performed by: STUDENT IN AN ORGANIZED HEALTH CARE EDUCATION/TRAINING PROGRAM

## 2024-09-17 PROCEDURE — G0439 PPPS, SUBSEQ VISIT: HCPCS | Performed by: STUDENT IN AN ORGANIZED HEALTH CARE EDUCATION/TRAINING PROGRAM

## 2024-09-17 PROCEDURE — G0008 ADMIN INFLUENZA VIRUS VAC: HCPCS | Performed by: STUDENT IN AN ORGANIZED HEALTH CARE EDUCATION/TRAINING PROGRAM

## 2024-09-17 PROCEDURE — 1170F FXNL STATUS ASSESSED: CPT | Performed by: STUDENT IN AN ORGANIZED HEALTH CARE EDUCATION/TRAINING PROGRAM

## 2024-09-17 PROCEDURE — 3078F DIAST BP <80 MM HG: CPT | Performed by: STUDENT IN AN ORGANIZED HEALTH CARE EDUCATION/TRAINING PROGRAM

## 2024-09-17 PROCEDURE — 90673 RIV3 VACCINE NO PRESERV IM: CPT | Performed by: STUDENT IN AN ORGANIZED HEALTH CARE EDUCATION/TRAINING PROGRAM

## 2024-09-17 ASSESSMENT — ENCOUNTER SYMPTOMS
DEPRESSION: 0
LOSS OF SENSATION IN FEET: 0
OCCASIONAL FEELINGS OF UNSTEADINESS: 1

## 2024-09-17 ASSESSMENT — PATIENT HEALTH QUESTIONNAIRE - PHQ9
SUM OF ALL RESPONSES TO PHQ9 QUESTIONS 1 AND 2: 0
1. LITTLE INTEREST OR PLEASURE IN DOING THINGS: NOT AT ALL
2. FEELING DOWN, DEPRESSED OR HOPELESS: NOT AT ALL

## 2024-09-17 ASSESSMENT — ACTIVITIES OF DAILY LIVING (ADL)
TAKING_MEDICATION: INDEPENDENT
BATHING: INDEPENDENT
DRESSING: INDEPENDENT
MANAGING_FINANCES: INDEPENDENT
GROCERY_SHOPPING: INDEPENDENT
DOING_HOUSEWORK: INDEPENDENT

## 2024-09-17 NOTE — PROGRESS NOTES
"Subjective:  Quyen Rolon is a 78 y.o. female who presents to clinic today for Follow-up (6 MO check with Labs and has form from [employer] )      She notes that she had sinus issues when her last blood was drawn.     She notes that her pain has gotten better. She was taking ibuprofen and tylenol. She is not taking ibuprofen anymore. She is not having fevers or night sweats. She has finished her antibiotics.    She's on metformin 1000mg BID.     Her daughter, [employer]ric [employer], would be her decision maker if needed.     Review of Systems    Assessment/Plan:  Quyen Rolon is a 78 y.o. female with a history of *** who presents to clinic today to address the following issues:   No diagnosis found.    Problem List Items Addressed This Visit    None      There are no Patient Instructions on file for this visit.    Follow up: ***    Return precautions discussed.  An After Visit Summary was given to the patient.  All questions were answered and patient in agreement with plan.    Objective:  /76   Pulse 59   Ht 1.499 m (4' 11\")   Wt 73.9 kg (163 lb)   SpO2 95%   BMI 32.92 kg/m²   ***  Physical Exam    I spent *** minutes in total time for this visit including all related clinical activities before, during, and after the visit excluding other billable activities/procedure time.     Grecia Jarvis MD    "

## 2024-09-17 NOTE — PROGRESS NOTES
"Subjective   Reason for Visit: Quyen Rolon is an 78 y.o. female here for a Medicare Wellness visit.     Past Medical, Surgical, and Family History reviewed and updated in chart.    Reviewed all medications by prescribing practitioner or clinical pharmacist (such as prescriptions, OTCs, herbal therapies and supplements) and documented in the medical record.    She notes that she had sinus issues when her last blood was drawn.     She notes that her pain has gotten better. She was taking ibuprofen and tylenol. She is not taking ibuprofen anymore. She is not having fevers or night sweats. She has finished her antibiotics.    She's on metformin 1000mg BID.     Her daughter, [employer], would be her decision maker if needed.         Patient Care Team:  Grecia Jarvis MD as PCP - General (Family Medicine)  Geri Santo LPN as Care Manager (Case Management)     Review of Systems    Objective   Vitals:  /76   Pulse 59   Ht 1.499 m (4' 11\")   Wt 73.9 kg (163 lb)   SpO2 95%   BMI 32.92 kg/m²       Physical Exam  Vitals and nursing note reviewed.   Constitutional:       General: She is not in acute distress.     Appearance: Normal appearance. She is obese.   HENT:      Head: Normocephalic and atraumatic.      Mouth/Throat:      Mouth: Mucous membranes are moist.   Eyes:      General: No scleral icterus.        Right eye: No discharge.         Left eye: No discharge.      Extraocular Movements: Extraocular movements intact.      Conjunctiva/sclera: Conjunctivae normal.   Cardiovascular:      Rate and Rhythm: Normal rate and regular rhythm.   Pulmonary:      Effort: Pulmonary effort is normal. No respiratory distress.      Breath sounds: Normal breath sounds.   Abdominal:      Palpations: Abdomen is soft.      Tenderness: There is abdominal tenderness (mild RLQ tenderness).   Skin:     General: Skin is warm and dry.   Neurological:      General: No focal deficit present.      Mental Status: She is alert and " oriented to person, place, and time.   Psychiatric:         Attention and Perception: Attention normal.         Mood and Affect: Mood normal.         Speech: Speech normal.         Behavior: Behavior normal.         Cognition and Memory: Cognition and memory normal.         Judgment: Judgment normal.         Assessment & Plan  Encounter for annual wellness visit (AWV) in Medicare patient  Medical Personal Health Plan:    Flu shot today  COVID Booster  Diabetic Retinopathy screening - getting done on Sep 30       Needs flu shot    Orders:    Flu vaccine, trivalent, preservative free, no egg protein, age 18y+ (Flublok)    Need for hepatitis C screening test    Orders:    Hepatitis C antibody; Future    Goals of care, counseling/discussion    Orders:    DNR (Patient allows mechanical ventilation)    Hypertension, unspecified type  Recommended rechecking CMP today continuing on current medications which include amlodipine 2.5 and Toprol-XL 25 mg twice daily and losartan 100 mg  Orders:    Comprehensive Metabolic Panel; Future    Iliopsoas bursitis of right hip  Discussed with Carito that I am glad her symptoms have significantly improved but I am concerned about the leukocytosis on her recent blood work.  Due to this we will recheck today and if remaining elevated at that time determine next steps including potential reimaging.       Leukocytosis, unspecified type    Orders:    CBC; Future    Chronic kidney disease, stage 3a (Multi)  Chronic problem, known to provider  CKD currently stable working to manage diabetes and hypertension will continue to monitor with annual labs                Advance Directives Discussion  10 were spent discussing Advanced Care Planning (including a Living Will, Medical Power Of , as well as specific end of life choices and/or directives). The details of that discussion were documented in Advanced Directives Discussion section of the medical record.       Grecia Jarvis MD

## 2024-09-17 NOTE — PATIENT INSTRUCTIONS
Medical Personal Health Plan:    Flu shot today  COVID Booster  Diabetic Retinopathy screening - getting done on Sep 30

## 2024-09-17 NOTE — ASSESSMENT & PLAN NOTE
Chronic problem, known to provider  CKD currently stable working to manage diabetes and hypertension will continue to monitor with annual labs         
Discussed with Carito that I am glad her symptoms have significantly improved but I am concerned about the leukocytosis on her recent blood work.  Due to this we will recheck today and if remaining elevated at that time determine next steps including potential reimaging.       
Recommended rechecking CMP today continuing on current medications which include amlodipine 2.5 and Toprol-XL 25 mg twice daily and losartan 100 mg  Orders:    Comprehensive Metabolic Panel; Future    
attending Maite: 67yM h/o AAA s/p EVAR, GERD, htn, glaucoma p/w L abd pain x4d. L mid-abd radiating LUQ and LLQ, worse at night. Former smoker. Denies f/c, cp/sob/cough, n/v/d/c, melena/BRBPR, dysuria/frequency/hematuria. Denies recent etoh. Last BM today (NB). +flatus. On exam, VSS, well appearing, abdomen distended, no rigidity, mildly tender LLQ. Will obtain labs including vbg with lactate, lipase, urinalysis IVF, analgesia, CT w/wo triple phase and reassess.

## 2024-09-18 ENCOUNTER — TELEPHONE (OUTPATIENT)
Dept: OBSTETRICS AND GYNECOLOGY | Facility: CLINIC | Age: 79
End: 2024-09-18
Payer: COMMERCIAL

## 2024-09-18 DIAGNOSIS — E11.9 TYPE 2 DIABETES MELLITUS WITHOUT COMPLICATION, WITHOUT LONG-TERM CURRENT USE OF INSULIN (MULTI): Primary | ICD-10-CM

## 2024-09-18 LAB — HCV AB SER QL: NONREACTIVE

## 2024-09-20 ENCOUNTER — TELEPHONE (OUTPATIENT)
Age: 79
End: 2024-09-20
Payer: COMMERCIAL

## 2024-09-20 NOTE — TELEPHONE ENCOUNTER
"Pt left message, states in her notes from last appointment it states \"diabetic screening done in July\", pt would like to know more information about that.  \"Please explain\" is what she said.     Please advise.       "

## 2024-09-21 NOTE — TELEPHONE ENCOUNTER
Hello,    I read my note from last week and I do not see anything about July in the note. I am not sure what she is referring to. Sorry I cannot be more helpful.    Grecia Jarvis MD

## 2024-09-24 ENCOUNTER — APPOINTMENT (OUTPATIENT)
Dept: ORTHOPEDIC SURGERY | Facility: CLINIC | Age: 79
End: 2024-09-24
Payer: COMMERCIAL

## 2024-09-25 ENCOUNTER — APPOINTMENT (OUTPATIENT)
Dept: ORTHOPEDIC SURGERY | Facility: CLINIC | Age: 79
End: 2024-09-25
Payer: COMMERCIAL

## 2024-09-26 DIAGNOSIS — M70.71 ILIOPSOAS BURSITIS OF RIGHT HIP: ICD-10-CM

## 2024-10-03 ENCOUNTER — APPOINTMENT (OUTPATIENT)
Dept: PRIMARY CARE | Facility: CLINIC | Age: 79
End: 2024-10-03
Payer: COMMERCIAL

## 2024-10-08 ENCOUNTER — HOSPITAL ENCOUNTER (OUTPATIENT)
Dept: VASCULAR MEDICINE | Facility: HOSPITAL | Age: 79
Discharge: HOME | End: 2024-10-08
Payer: COMMERCIAL

## 2024-10-08 ENCOUNTER — APPOINTMENT (OUTPATIENT)
Age: 79
End: 2024-10-08
Payer: COMMERCIAL

## 2024-10-08 VITALS
SYSTOLIC BLOOD PRESSURE: 138 MMHG | BODY MASS INDEX: 33.55 KG/M2 | DIASTOLIC BLOOD PRESSURE: 74 MMHG | RESPIRATION RATE: 18 BRPM | HEART RATE: 58 BPM | WEIGHT: 166.4 LBS | HEIGHT: 59 IN

## 2024-10-08 DIAGNOSIS — R09.89 OTHER SPECIFIED SYMPTOMS AND SIGNS INVOLVING THE CIRCULATORY AND RESPIRATORY SYSTEMS: ICD-10-CM

## 2024-10-08 DIAGNOSIS — M79.661 PAIN AND SWELLING OF RIGHT LOWER LEG: ICD-10-CM

## 2024-10-08 DIAGNOSIS — R60.0 LOCALIZED EDEMA: ICD-10-CM

## 2024-10-08 DIAGNOSIS — M79.661 PAIN AND SWELLING OF RIGHT LOWER LEG: Primary | ICD-10-CM

## 2024-10-08 DIAGNOSIS — M79.89 PAIN AND SWELLING OF RIGHT LOWER LEG: Primary | ICD-10-CM

## 2024-10-08 DIAGNOSIS — M79.89 PAIN AND SWELLING OF RIGHT LOWER LEG: ICD-10-CM

## 2024-10-08 PROCEDURE — 1036F TOBACCO NON-USER: CPT | Performed by: STUDENT IN AN ORGANIZED HEALTH CARE EDUCATION/TRAINING PROGRAM

## 2024-10-08 PROCEDURE — 93971 EXTREMITY STUDY: CPT

## 2024-10-08 PROCEDURE — 3078F DIAST BP <80 MM HG: CPT | Performed by: STUDENT IN AN ORGANIZED HEALTH CARE EDUCATION/TRAINING PROGRAM

## 2024-10-08 PROCEDURE — 1160F RVW MEDS BY RX/DR IN RCRD: CPT | Performed by: STUDENT IN AN ORGANIZED HEALTH CARE EDUCATION/TRAINING PROGRAM

## 2024-10-08 PROCEDURE — 93971 EXTREMITY STUDY: CPT | Performed by: INTERNAL MEDICINE

## 2024-10-08 PROCEDURE — 3075F SYST BP GE 130 - 139MM HG: CPT | Performed by: STUDENT IN AN ORGANIZED HEALTH CARE EDUCATION/TRAINING PROGRAM

## 2024-10-08 PROCEDURE — 99213 OFFICE O/P EST LOW 20 MIN: CPT | Performed by: STUDENT IN AN ORGANIZED HEALTH CARE EDUCATION/TRAINING PROGRAM

## 2024-10-08 PROCEDURE — 1159F MED LIST DOCD IN RCRD: CPT | Performed by: STUDENT IN AN ORGANIZED HEALTH CARE EDUCATION/TRAINING PROGRAM

## 2024-10-08 NOTE — PROGRESS NOTES
"Subjective:  Quyen Rolon is a 78 y.o. female who presents to clinic today for Leg Swelling (Right leg swelling about 8 days 9/30/2024 or 10/01/2024/)      6 weeks ago both legs started aching, since then (8 days ago) her right leg started swelling and being painful. Swelling is up to the knee and it is noticeably larger than the other side.    She takes aspirin.    She notes that she's been more sedentary recently and propping her leg up more because her legs were sore and hurt with movement.    She also recently had a iliopsoas bursitis vs abscess August 19, 2024    No chest pain or increasing SOB.    Review of Systems    Assessment/Plan:  Quyen Rolon is a 78 y.o. female who presents to clinic today to address the following issues:   1. Pain and swelling of right lower leg  Vascular US lower extremity arterial duplex right      2. Other specified symptoms and signs involving the circulatory and respiratory systems  Vascular US lower extremity arterial duplex right        - Acute problem, unresolved, new to this provider, requires further workup and treatment   - concern for possible DVT w/ known risk factors and recent hospitalization/immbolixation, will obtain stat Duplex to look for DVT    Problem List Items Addressed This Visit    None  Visit Diagnoses       Pain and swelling of right lower leg    -  Primary    Relevant Orders    Vascular US lower extremity arterial duplex right    Other specified symptoms and signs involving the circulatory and respiratory systems        Relevant Orders    Vascular US lower extremity arterial duplex right            There are no Patient Instructions on file for this visit.    Follow up: as needed    Return precautions discussed.  An After Visit Summary was given to the patient.  All questions were answered and patient in agreement with plan.    Objective:  /74   Pulse 58   Resp 18   Ht 1.499 m (4' 11\")   Wt 75.5 kg (166 lb 6.4 oz)   " BMI 33.61 kg/m²     Physical Exam  Vitals and nursing note reviewed.   Constitutional:       General: She is not in acute distress.     Appearance: Normal appearance.   HENT:      Head: Normocephalic and atraumatic.      Mouth/Throat:      Mouth: Mucous membranes are moist.   Eyes:      General: No scleral icterus.        Right eye: No discharge.         Left eye: No discharge.      Extraocular Movements: Extraocular movements intact.      Conjunctiva/sclera: Conjunctivae normal.   Pulmonary:      Effort: Pulmonary effort is normal. No respiratory distress.   Musculoskeletal:      Right lower leg: Edema present.      Comments: Right lower extremity with 1-2+ pitting edema to the knee, right extremity nearly twice the size of left lower extremity  Right side tender to touch   Skin:     General: Skin is warm and dry.   Neurological:      General: No focal deficit present.      Mental Status: She is alert and oriented to person, place, and time.   Psychiatric:         Attention and Perception: Attention normal.         Mood and Affect: Mood normal.         Speech: Speech normal.         Behavior: Behavior normal.         Cognition and Memory: Cognition and memory normal.         Judgment: Judgment normal.         I spent 12 minutes in total time for this visit including all related clinical activities before, during, and after the visit excluding other billable activities/procedure time.     Grecia Jarvis MD

## 2024-10-09 ENCOUNTER — PATIENT OUTREACH (OUTPATIENT)
Age: 79
End: 2024-10-09
Payer: COMMERCIAL

## 2024-10-09 NOTE — PROGRESS NOTES
Successful outreach to patient regarding hospitalization as patient continues TCM program.   At time of outreach call the patient feels as if their condition has improved since initial visit with PCP or specialist. Questions or concerns addressed at this time with patient. Patient is waiting on ultrasound result to figure out the next steps. Provided contact information to patient if any further non-emergent needs arise.

## 2024-10-10 ENCOUNTER — APPOINTMENT (OUTPATIENT)
Dept: PRIMARY CARE | Facility: CLINIC | Age: 79
End: 2024-10-10
Payer: COMMERCIAL

## 2024-10-10 RX ORDER — IBUPROFEN 600 MG/1
600 TABLET ORAL 3 TIMES DAILY PRN
Qty: 60 TABLET | Refills: 1 | Status: SHIPPED | OUTPATIENT
Start: 2024-10-10

## 2024-10-10 NOTE — TELEPHONE ENCOUNTER
Pt left message asking if her ultrasound of her leg has been read yet and if there are any results.     Please advise.

## 2024-10-11 ENCOUNTER — TELEPHONE (OUTPATIENT)
Age: 79
End: 2024-10-11
Payer: COMMERCIAL

## 2024-10-11 NOTE — TELEPHONE ENCOUNTER
Spoke with dr. Zuluaga about the results, pt states she needs something for the swelling. Dr. Zuluaga states to elevate leg as much as possible and to wear support stockings during the day. Pt is informed.

## 2024-10-15 ENCOUNTER — TELEPHONE (OUTPATIENT)
Age: 79
End: 2024-10-15
Payer: COMMERCIAL

## 2024-10-15 NOTE — TELEPHONE ENCOUNTER
Pt left message about leg swelling. Pt was called 10/11 for her test results. Dr. Zuluaga stated to elevate the leg as much as possible and to wear support stockings.     Pt wants to know if you will do anything for her leg swelling.     Please advise.

## 2024-10-17 ENCOUNTER — OFFICE VISIT (OUTPATIENT)
Age: 79
End: 2024-10-17
Payer: MEDICAID

## 2024-10-17 VITALS
HEIGHT: 59 IN | SYSTOLIC BLOOD PRESSURE: 136 MMHG | DIASTOLIC BLOOD PRESSURE: 74 MMHG | HEART RATE: 70 BPM | BODY MASS INDEX: 34.21 KG/M2 | WEIGHT: 169.7 LBS | OXYGEN SATURATION: 96 %

## 2024-10-17 DIAGNOSIS — M70.71 ILIOPSOAS BURSITIS OF RIGHT HIP: ICD-10-CM

## 2024-10-17 DIAGNOSIS — M79.661 PAIN AND SWELLING OF RIGHT LOWER LEG: Primary | ICD-10-CM

## 2024-10-17 DIAGNOSIS — M79.89 PAIN AND SWELLING OF RIGHT LOWER LEG: Primary | ICD-10-CM

## 2024-10-17 PROCEDURE — 3075F SYST BP GE 130 - 139MM HG: CPT | Performed by: STUDENT IN AN ORGANIZED HEALTH CARE EDUCATION/TRAINING PROGRAM

## 2024-10-17 PROCEDURE — 1160F RVW MEDS BY RX/DR IN RCRD: CPT | Performed by: STUDENT IN AN ORGANIZED HEALTH CARE EDUCATION/TRAINING PROGRAM

## 2024-10-17 PROCEDURE — 99214 OFFICE O/P EST MOD 30 MIN: CPT | Performed by: STUDENT IN AN ORGANIZED HEALTH CARE EDUCATION/TRAINING PROGRAM

## 2024-10-17 PROCEDURE — 1036F TOBACCO NON-USER: CPT | Performed by: STUDENT IN AN ORGANIZED HEALTH CARE EDUCATION/TRAINING PROGRAM

## 2024-10-17 PROCEDURE — 1159F MED LIST DOCD IN RCRD: CPT | Performed by: STUDENT IN AN ORGANIZED HEALTH CARE EDUCATION/TRAINING PROGRAM

## 2024-10-17 PROCEDURE — 3078F DIAST BP <80 MM HG: CPT | Performed by: STUDENT IN AN ORGANIZED HEALTH CARE EDUCATION/TRAINING PROGRAM

## 2024-10-17 PROCEDURE — 1125F AMNT PAIN NOTED PAIN PRSNT: CPT | Performed by: STUDENT IN AN ORGANIZED HEALTH CARE EDUCATION/TRAINING PROGRAM

## 2024-10-17 ASSESSMENT — PAIN SCALES - GENERAL: PAINLEVEL_OUTOF10: 7

## 2024-10-17 ASSESSMENT — PATIENT HEALTH QUESTIONNAIRE - PHQ9
2. FEELING DOWN, DEPRESSED OR HOPELESS: NOT AT ALL
SUM OF ALL RESPONSES TO PHQ9 QUESTIONS 1 & 2: 0
1. LITTLE INTEREST OR PLEASURE IN DOING THINGS: NOT AT ALL

## 2024-10-17 NOTE — PROGRESS NOTES
Subjective:  Quyen Rolon is a 79 y.o. female who presents to clinic today for Leg Swelling (RT LEG )      Quyen notes that her leg swelling is getting worse and is increasingly painful since she was seen here last.  She notes that she did get her compression stockings just today and since she started wearing them it does feel slightly better.  Additionally she had her DVT scan which was negative.  She is not aware of any other issues infection or pain on her right leg.  She has not been having increasing groin and flank pain.    Review of Systems    Assessment/Plan:  Quyen Rolon is a 79 y.o. female with a history of hypertension, CAD, uterine cancer  who presents to clinic today to address the following issues:   1. Pain and swelling of right lower leg  Comprehensive metabolic panel    CT pelvis w IV contrast      2. Iliopsoas bursitis of right hip  Comprehensive metabolic panel    CT pelvis w IV contrast      I called and spoke about case with the radiologist to discuss if there was the potential that prior iliopsoas bursitis may be causing obstruction of venous drainage.  He thought that this was improbable but possible and recommended evaluation with pelvic CT with contrast.  If this is within normal limits he recommended evaluating for potential superficial venous thrombophlebitis versus other venous issues even though she recently had duplex that was within normal limits.    If CTA does not show explanation for swelling will order further evaluation with outpatient having to come back to office.    Problem List Items Addressed This Visit       Iliopsoas bursitis of right hip    Relevant Orders    Comprehensive metabolic panel    CT pelvis w IV contrast     Other Visit Diagnoses       Pain and swelling of right lower leg    -  Primary    Relevant Orders    Comprehensive metabolic panel    CT pelvis w IV contrast            There are no Patient Instructions on file for this  "visit.    Follow up: 1 month    Return precautions discussed.  An After Visit Summary was given to the patient.  All questions were answered and patient in agreement with plan.    Objective:  /74   Pulse 70   Ht 1.499 m (4' 11\")   Wt 77 kg (169 lb 11.2 oz)   SpO2 96%   BMI 34.28 kg/m²     Physical Exam  Vitals and nursing note reviewed.   Constitutional:       General: She is not in acute distress.     Appearance: Normal appearance.   HENT:      Head: Normocephalic and atraumatic.      Mouth/Throat:      Mouth: Mucous membranes are moist.   Eyes:      General: No scleral icterus.        Right eye: No discharge.         Left eye: No discharge.      Extraocular Movements: Extraocular movements intact.      Conjunctiva/sclera: Conjunctivae normal.   Pulmonary:      Effort: No respiratory distress.   Musculoskeletal:         General: Swelling present.      Right lower leg: Edema (2+ above the knee) present.      Left lower leg: Edema (trace) present.   Skin:     General: Skin is warm and dry.   Neurological:      General: No focal deficit present.      Mental Status: She is alert and oriented to person, place, and time.   Psychiatric:         Attention and Perception: Attention normal.         Mood and Affect: Mood normal.         Speech: Speech normal.         Behavior: Behavior normal.         Cognition and Memory: Cognition and memory normal.         Judgment: Judgment normal.         I spent 21 minutes in total time for this visit including all related clinical activities before, during, and after the visit excluding other billable activities/procedure time.     Grecia Jarvis MD    "

## 2024-10-25 ENCOUNTER — HOSPITAL ENCOUNTER (OUTPATIENT)
Dept: RADIOLOGY | Facility: HOSPITAL | Age: 79
Discharge: HOME | End: 2024-10-25
Payer: COMMERCIAL

## 2024-10-25 DIAGNOSIS — M79.89 PAIN AND SWELLING OF RIGHT LOWER LEG: ICD-10-CM

## 2024-10-25 DIAGNOSIS — M79.661 PAIN AND SWELLING OF RIGHT LOWER LEG: ICD-10-CM

## 2024-10-25 DIAGNOSIS — M70.71 ILIOPSOAS BURSITIS OF RIGHT HIP: ICD-10-CM

## 2024-10-25 PROCEDURE — 2550000001 HC RX 255 CONTRASTS: Performed by: STUDENT IN AN ORGANIZED HEALTH CARE EDUCATION/TRAINING PROGRAM

## 2024-10-25 PROCEDURE — 72193 CT PELVIS W/DYE: CPT

## 2024-10-28 ENCOUNTER — TELEPHONE (OUTPATIENT)
Age: 79
End: 2024-10-28
Payer: COMMERCIAL

## 2024-11-07 ENCOUNTER — PATIENT OUTREACH (OUTPATIENT)
Age: 79
End: 2024-11-07
Payer: COMMERCIAL

## 2024-11-07 NOTE — PROGRESS NOTES
Successful 90 day outreach to patient regarding hospitalization as patient continues TCM program.   At time of outreach call the patient feels as if their condition has improved since initial visit with PCP or specialist. Questions or concerns addressed at this time with patient.

## 2024-12-09 ENCOUNTER — LAB (OUTPATIENT)
Dept: LAB | Facility: LAB | Age: 79
End: 2024-12-09
Payer: COMMERCIAL

## 2024-12-09 DIAGNOSIS — M79.661 PAIN AND SWELLING OF RIGHT LOWER LEG: ICD-10-CM

## 2024-12-09 DIAGNOSIS — M79.89 PAIN AND SWELLING OF RIGHT LOWER LEG: ICD-10-CM

## 2024-12-09 DIAGNOSIS — E11.9 TYPE 2 DIABETES MELLITUS WITHOUT COMPLICATION, WITHOUT LONG-TERM CURRENT USE OF INSULIN (MULTI): ICD-10-CM

## 2024-12-09 DIAGNOSIS — M70.71 ILIOPSOAS BURSITIS OF RIGHT HIP: ICD-10-CM

## 2024-12-09 LAB
ALBUMIN SERPL BCP-MCNC: 4.3 G/DL (ref 3.4–5)
ALP SERPL-CCNC: 57 U/L (ref 33–136)
ALT SERPL W P-5'-P-CCNC: 22 U/L (ref 7–45)
ANION GAP SERPL CALC-SCNC: 12 MMOL/L (ref 10–20)
AST SERPL W P-5'-P-CCNC: 20 U/L (ref 9–39)
BILIRUB SERPL-MCNC: 0.5 MG/DL (ref 0–1.2)
BUN SERPL-MCNC: 24 MG/DL (ref 6–23)
CALCIUM SERPL-MCNC: 10.2 MG/DL (ref 8.6–10.3)
CHLORIDE SERPL-SCNC: 106 MMOL/L (ref 98–107)
CO2 SERPL-SCNC: 25 MMOL/L (ref 21–32)
CREAT SERPL-MCNC: 1.01 MG/DL (ref 0.5–1.05)
EGFRCR SERPLBLD CKD-EPI 2021: 56 ML/MIN/1.73M*2
GLUCOSE SERPL-MCNC: 149 MG/DL (ref 74–99)
POTASSIUM SERPL-SCNC: 4.7 MMOL/L (ref 3.5–5.3)
PROT SERPL-MCNC: 6.6 G/DL (ref 6.4–8.2)
SODIUM SERPL-SCNC: 138 MMOL/L (ref 136–145)

## 2024-12-09 PROCEDURE — 80053 COMPREHEN METABOLIC PANEL: CPT

## 2024-12-09 PROCEDURE — 36415 COLL VENOUS BLD VENIPUNCTURE: CPT

## 2024-12-09 PROCEDURE — 83036 HEMOGLOBIN GLYCOSYLATED A1C: CPT

## 2024-12-10 LAB
EST. AVERAGE GLUCOSE BLD GHB EST-MCNC: 166 MG/DL
HBA1C MFR BLD: 7.4 %

## 2024-12-14 DIAGNOSIS — E11.9 TYPE 2 DIABETES MELLITUS WITHOUT COMPLICATION, WITHOUT LONG-TERM CURRENT USE OF INSULIN (MULTI): ICD-10-CM

## 2024-12-14 DIAGNOSIS — M70.71 ILIOPSOAS BURSITIS OF RIGHT HIP: ICD-10-CM

## 2024-12-19 ENCOUNTER — APPOINTMENT (OUTPATIENT)
Age: 79
End: 2024-12-19
Payer: COMMERCIAL

## 2024-12-19 VITALS
HEART RATE: 87 BPM | BODY MASS INDEX: 32.6 KG/M2 | OXYGEN SATURATION: 95 % | SYSTOLIC BLOOD PRESSURE: 134 MMHG | HEIGHT: 59 IN | DIASTOLIC BLOOD PRESSURE: 86 MMHG | WEIGHT: 161.69 LBS

## 2024-12-19 DIAGNOSIS — I49.1 PREMATURE ATRIAL COMPLEXES: Primary | ICD-10-CM

## 2024-12-19 DIAGNOSIS — R00.2 PALPITATIONS: ICD-10-CM

## 2024-12-19 DIAGNOSIS — E11.9 TYPE 2 DIABETES MELLITUS WITHOUT COMPLICATION, WITHOUT LONG-TERM CURRENT USE OF INSULIN (MULTI): ICD-10-CM

## 2024-12-19 NOTE — PROGRESS NOTES
"HPI cough subjective:  Quyen Rolon \"Terri\" Ольга is a 79 y.o. female who presents to clinic today for Follow-up (3 MO FU )      She notes that she needs large compression stockings based on her measurements. She currently has medium ones. They are leaving large skin marks.  She's not been having pain. This improved. Her leg no longer feels bruised. Her groin pain on the right side is also dramatically improved.    She was told to take 300mg of magnesium. She is having dificulty with her tonsils and they are having bad odors.     Review of Systems    Assessment/Plan:  Quyen Rolon \"Terri\" Ольга is a 79 y.o. female  who presents to clinic today to address the following issues:   1. Premature atrial complexes  Magnesium    Basic metabolic panel    CBC and Auto Differential    ECG 12 lead (Clinic Performed)      2. Palpitations  Magnesium    Basic metabolic panel    CBC and Auto Differential    ECG 12 lead (Clinic Performed)      3. Type 2 diabetes mellitus without complication, without long-term current use of insulin (Multi)  Hemoglobin A1c        Palpitations:  - Chronic problem, unresolved, new to this provider, requires further workup and treatment   - Discussed with pt that initially examination seems consistent with atrial fibrillation which led to EKG.  EKG consistent with premature atrial complexes.  Due to this we discussed obtaining labs which she will get tomorrow morning.  Additionally, discussed hydration, nutrition and consistently taking beta blocker. HR will not support increased dose.     Diabetes:  Chronic problem known to provider  Hemoglobin A1c above goal at 7.4 but patient would not like to make any modifications and that is reasonable based on age.  Problem List Items Addressed This Visit       Type 2 diabetes mellitus without complication, without long-term current use of insulin (Multi)    Overview     Hgb A1c 6.6 in June 2023.         Relevant Orders    Hemoglobin A1c " "    Other Visit Diagnoses       Premature atrial complexes    -  Primary    Relevant Orders    Magnesium    Basic metabolic panel    CBC and Auto Differential    ECG 12 lead (Clinic Performed)    Palpitations        Relevant Orders    Magnesium    Basic metabolic panel    CBC and Auto Differential    ECG 12 lead (Clinic Performed)            There are no Patient Instructions on file for this visit.    Follow up: 6 months    Return precautions discussed.  An After Visit Summary was given to the patient.  All questions were answered and patient in agreement with plan.    Objective:  /86   Pulse 87   Ht 1.499 m (4' 11\")   Wt 73.3 kg (161 lb 11 oz)   SpO2 95%   BMI 32.66 kg/m²     Physical Exam  Vitals and nursing note reviewed.   Constitutional:       General: She is not in acute distress.     Appearance: Normal appearance.   HENT:      Head: Normocephalic and atraumatic.      Ears:      Comments: Swollen tonsils     Mouth/Throat:      Mouth: Mucous membranes are moist.   Eyes:      General: No scleral icterus.        Right eye: No discharge.         Left eye: No discharge.      Extraocular Movements: Extraocular movements intact.      Conjunctiva/sclera: Conjunctivae normal.   Cardiovascular:      Rate and Rhythm: Normal rate.      Comments: Frequent PAC heard  Pulmonary:      Effort: No respiratory distress.   Skin:     General: Skin is warm and dry.   Neurological:      General: No focal deficit present.      Mental Status: She is alert and oriented to person, place, and time.   Psychiatric:         Attention and Perception: Attention normal.         Mood and Affect: Mood normal.         Speech: Speech normal.         Behavior: Behavior normal.         Cognition and Memory: Cognition and memory normal.         Judgment: Judgment normal.         I spent 18 minutes in total time for this visit including all related clinical activities before, during, and after the visit excluding other billable " activities/procedure time.     Grecia Jarvis MD

## 2024-12-20 ENCOUNTER — LAB (OUTPATIENT)
Dept: LAB | Facility: LAB | Age: 79
End: 2024-12-20
Payer: COMMERCIAL

## 2024-12-20 DIAGNOSIS — R00.2 PALPITATIONS: ICD-10-CM

## 2024-12-20 DIAGNOSIS — I49.1 PREMATURE ATRIAL COMPLEXES: ICD-10-CM

## 2024-12-20 DIAGNOSIS — Z95.5 HISTORY OF CORONARY ARTERY STENT PLACEMENT: ICD-10-CM

## 2024-12-20 LAB
ANION GAP SERPL CALC-SCNC: 12 MMOL/L (ref 10–20)
BASOPHILS # BLD AUTO: 0.05 X10*3/UL (ref 0–0.1)
BASOPHILS NFR BLD AUTO: 0.4 %
BUN SERPL-MCNC: 22 MG/DL (ref 6–23)
CALCIUM SERPL-MCNC: 9.9 MG/DL (ref 8.6–10.3)
CHLORIDE SERPL-SCNC: 106 MMOL/L (ref 98–107)
CO2 SERPL-SCNC: 27 MMOL/L (ref 21–32)
CREAT SERPL-MCNC: 0.96 MG/DL (ref 0.5–1.05)
EGFRCR SERPLBLD CKD-EPI 2021: 60 ML/MIN/1.73M*2
EOSINOPHIL # BLD AUTO: 0.52 X10*3/UL (ref 0–0.4)
EOSINOPHIL NFR BLD AUTO: 4.4 %
ERYTHROCYTE [DISTWIDTH] IN BLOOD BY AUTOMATED COUNT: 13.2 % (ref 11.5–14.5)
GLUCOSE SERPL-MCNC: 154 MG/DL (ref 74–99)
HCT VFR BLD AUTO: 38.6 % (ref 36–46)
HGB BLD-MCNC: 12 G/DL (ref 12–16)
IMM GRANULOCYTES # BLD AUTO: 0.04 X10*3/UL (ref 0–0.5)
IMM GRANULOCYTES NFR BLD AUTO: 0.3 % (ref 0–0.9)
LYMPHOCYTES # BLD AUTO: 4.64 X10*3/UL (ref 0.8–3)
LYMPHOCYTES NFR BLD AUTO: 39.1 %
MAGNESIUM SERPL-MCNC: 1.84 MG/DL (ref 1.6–2.4)
MCH RBC QN AUTO: 32.5 PG (ref 26–34)
MCHC RBC AUTO-ENTMCNC: 31.1 G/DL (ref 32–36)
MCV RBC AUTO: 105 FL (ref 80–100)
MONOCYTES # BLD AUTO: 0.96 X10*3/UL (ref 0.05–0.8)
MONOCYTES NFR BLD AUTO: 8.1 %
NEUTROPHILS # BLD AUTO: 5.67 X10*3/UL (ref 1.6–5.5)
NEUTROPHILS NFR BLD AUTO: 47.7 %
NRBC BLD-RTO: 0 /100 WBCS (ref 0–0)
PLATELET # BLD AUTO: 341 X10*3/UL (ref 150–450)
POTASSIUM SERPL-SCNC: 4.5 MMOL/L (ref 3.5–5.3)
RBC # BLD AUTO: 3.69 X10*6/UL (ref 4–5.2)
SODIUM SERPL-SCNC: 140 MMOL/L (ref 136–145)
WBC # BLD AUTO: 11.9 X10*3/UL (ref 4.4–11.3)

## 2024-12-20 PROCEDURE — 83735 ASSAY OF MAGNESIUM: CPT

## 2024-12-20 PROCEDURE — 36415 COLL VENOUS BLD VENIPUNCTURE: CPT

## 2024-12-20 PROCEDURE — 80048 BASIC METABOLIC PNL TOTAL CA: CPT

## 2024-12-20 PROCEDURE — 85025 COMPLETE CBC W/AUTO DIFF WBC: CPT

## 2024-12-23 RX ORDER — METOPROLOL SUCCINATE 50 MG/1
TABLET, EXTENDED RELEASE ORAL 2 TIMES DAILY
Qty: 100 TABLET | Refills: 2 | Status: SHIPPED | OUTPATIENT
Start: 2024-12-23

## 2024-12-23 NOTE — TELEPHONE ENCOUNTER
Rose:    Please call patient to discuss the following her labs look okay but she could take some additional magnesium succinate 400 mg daily..    Once finished please close the encounter.    Thank you,  Grecia Jarvis MD    PATIENT NOTIFIED. SHE STATES SHE IS ALREADY TAKING MAGNESIUM SUCCINATE 400 MG DAILY.

## 2024-12-26 RX ORDER — IBUPROFEN 600 MG/1
600 TABLET ORAL 3 TIMES DAILY PRN
Qty: 60 TABLET | Refills: 1 | Status: SHIPPED | OUTPATIENT
Start: 2024-12-26

## 2024-12-26 RX ORDER — BLOOD SUGAR DIAGNOSTIC
1 STRIP MISCELLANEOUS 2 TIMES DAILY
Qty: 100 STRIP | Refills: 1 | Status: SHIPPED | OUTPATIENT
Start: 2024-12-26

## 2024-12-30 ENCOUNTER — TELEPHONE (OUTPATIENT)
Age: 79
End: 2024-12-30
Payer: COMMERCIAL

## 2024-12-30 RX ORDER — CALCIUM CARBONATE 300MG(750)
400 TABLET,CHEWABLE ORAL 2 TIMES DAILY
COMMUNITY

## 2024-12-30 NOTE — TELEPHONE ENCOUNTER
Okay to take twice daily.    Grecia Jarvis MD        LEFT DETAILED MESSAGE ON PATIENT'S VOICE MAIL. INSTRUCTED TO CALL OFFICE WITH ANY QUESTIONS OR CONCERNS.

## 2024-12-30 NOTE — TELEPHONE ENCOUNTER
PATIENT NOTIFIED WITH HER LAB RESULTS AND THE NEED TO TAKE MAGNESIUM 400 MG DAILY. PATIENT STATES SHE IS ALREADY ON MAGNESIUM 400 MG DAILY. PLEASE ADVISE.

## 2025-01-02 ENCOUNTER — OFFICE VISIT (OUTPATIENT)
Age: 80
End: 2025-01-02
Payer: COMMERCIAL

## 2025-01-02 VITALS — OXYGEN SATURATION: 97 % | DIASTOLIC BLOOD PRESSURE: 98 MMHG | HEART RATE: 67 BPM | SYSTOLIC BLOOD PRESSURE: 158 MMHG

## 2025-01-02 DIAGNOSIS — K21.9 GASTROESOPHAGEAL REFLUX DISEASE, UNSPECIFIED WHETHER ESOPHAGITIS PRESENT: ICD-10-CM

## 2025-01-02 DIAGNOSIS — E11.9 TYPE 2 DIABETES MELLITUS WITHOUT COMPLICATION, WITHOUT LONG-TERM CURRENT USE OF INSULIN (MULTI): Primary | ICD-10-CM

## 2025-01-02 DIAGNOSIS — R30.0 DYSURIA: ICD-10-CM

## 2025-01-02 LAB
POC APPEARANCE, URINE: CLEAR
POC BILIRUBIN, URINE: NEGATIVE
POC BLOOD, URINE: NEGATIVE
POC COLOR, URINE: YELLOW
POC GLUCOSE, URINE: NEGATIVE MG/DL
POC KETONES, URINE: NEGATIVE MG/DL
POC LEUKOCYTES, URINE: NEGATIVE
POC NITRITE,URINE: NEGATIVE
POC PH, URINE: 7 PH
POC PROTEIN, URINE: NEGATIVE MG/DL
POC SPECIFIC GRAVITY, URINE: 1.01
POC UROBILINOGEN, URINE: 0.2 EU/DL

## 2025-01-02 PROCEDURE — 87086 URINE CULTURE/COLONY COUNT: CPT

## 2025-01-02 PROCEDURE — 3080F DIAST BP >= 90 MM HG: CPT | Performed by: STUDENT IN AN ORGANIZED HEALTH CARE EDUCATION/TRAINING PROGRAM

## 2025-01-02 PROCEDURE — 1159F MED LIST DOCD IN RCRD: CPT | Performed by: STUDENT IN AN ORGANIZED HEALTH CARE EDUCATION/TRAINING PROGRAM

## 2025-01-02 PROCEDURE — G2211 COMPLEX E/M VISIT ADD ON: HCPCS | Performed by: STUDENT IN AN ORGANIZED HEALTH CARE EDUCATION/TRAINING PROGRAM

## 2025-01-02 PROCEDURE — 99214 OFFICE O/P EST MOD 30 MIN: CPT | Performed by: STUDENT IN AN ORGANIZED HEALTH CARE EDUCATION/TRAINING PROGRAM

## 2025-01-02 PROCEDURE — 81003 URINALYSIS AUTO W/O SCOPE: CPT | Performed by: STUDENT IN AN ORGANIZED HEALTH CARE EDUCATION/TRAINING PROGRAM

## 2025-01-02 PROCEDURE — 3077F SYST BP >= 140 MM HG: CPT | Performed by: STUDENT IN AN ORGANIZED HEALTH CARE EDUCATION/TRAINING PROGRAM

## 2025-01-02 RX ORDER — DEXTROSE 4 G
TABLET,CHEWABLE ORAL
Qty: 1 EACH | Refills: 0 | Status: SHIPPED | OUTPATIENT
Start: 2025-01-02

## 2025-01-02 RX ORDER — FAMOTIDINE 20 MG/1
20 TABLET, FILM COATED ORAL 2 TIMES DAILY
Qty: 60 TABLET | Refills: 5 | Status: SHIPPED | OUTPATIENT
Start: 2025-01-02 | End: 2025-07-01

## 2025-01-02 NOTE — PROGRESS NOTES
"Subjective:  Quyen Rolon \"Terri\" Ольга is a 79 y.o. female who presents to clinic today for Difficulty Urinating (Pain,burning and itching x4 days)      She notes that she is having burning, itching and dysuria. Symptoms have been going on for 3-4 days. She has not had blood in her urine. She notes that she has had UTI previously. She also notes that her confusion has worsened.   She has burning in her throat and it feels like it goes all the way to her bladder.   She's going to have barium swallow test on Monday. She is then following up with Dr. Butt in [place].      Review of Systems    Assessment/Plan:  Quyen Rolon \"Terri\" Ольга is a 79 y.o. female with a history of GERD, urinary incontinence, diabetes who presents to clinic today to address the following issues:   1. Type 2 diabetes mellitus without complication, without long-term current use of insulin (Multi)  blood-glucose meter misc      2. Dysuria  POCT UA Automated manually resulted    Urine Culture    Urine Culture      3. Gastroesophageal reflux disease, unspecified whether esophagitis present  famotidine (Pepcid) 20 mg tablet        GERD:  - hx of surgical procedure previously, symptoms have returned, studies ordered and follow up scheduled with gen surg  - will start pepcid in the meantime due to her significant symptoms    Dysuria:  - ua not consistent with UTI, exam not consistent either, no signs of vaginal irriration  - will send for urine culture    Diabetes:  - reordered glucometer since prior one   Problem List Items Addressed This Visit       Type 2 diabetes mellitus without complication, without long-term current use of insulin (Multi) - Primary    Overview     Hgb A1c 6.6 in 2023.         Relevant Medications    blood-glucose meter misc     Other Visit Diagnoses       Dysuria        Relevant Orders    POCT UA Automated manually resulted (Completed)    Urine Culture (Completed)    Gastroesophageal reflux " disease, unspecified whether esophagitis present        Relevant Medications    famotidine (Pepcid) 20 mg tablet            There are no Patient Instructions on file for this visit.    Follow up: already scheduled    Return precautions discussed.  An After Visit Summary was given to the patient.  All questions were answered and patient in agreement with plan.    Objective:  BP (!) 158/98   Pulse 67   SpO2 97%     Physical Exam  Vitals and nursing note reviewed.   Constitutional:       General: She is not in acute distress.     Appearance: Normal appearance.   HENT:      Head: Normocephalic and atraumatic.      Mouth/Throat:      Mouth: Mucous membranes are moist.   Eyes:      General: No scleral icterus.        Right eye: No discharge.         Left eye: No discharge.      Extraocular Movements: Extraocular movements intact.      Conjunctiva/sclera: Conjunctivae normal.   Pulmonary:      Effort: No respiratory distress.   Abdominal:      Tenderness: There is no right CVA tenderness or left CVA tenderness.      Comments: No suprapubic tenderness   Skin:     General: Skin is warm and dry.   Neurological:      General: No focal deficit present.      Mental Status: She is alert and oriented to person, place, and time.   Psychiatric:         Attention and Perception: Attention normal.         Mood and Affect: Mood normal.         Speech: Speech normal.         Behavior: Behavior normal.         Cognition and Memory: Cognition and memory normal.         Judgment: Judgment normal.         I spent 16 minutes in total time for this visit including all related clinical activities before, during, and after the visit excluding other billable activities/procedure time.     Grecia Jarvis MD

## 2025-01-03 LAB — BACTERIA UR CULT: NORMAL

## 2025-01-08 ENCOUNTER — OFFICE VISIT (OUTPATIENT)
Age: 80
End: 2025-01-08
Payer: COMMERCIAL

## 2025-01-08 VITALS
WEIGHT: 155.1 LBS | HEIGHT: 59 IN | HEART RATE: 100 BPM | DIASTOLIC BLOOD PRESSURE: 78 MMHG | BODY MASS INDEX: 31.27 KG/M2 | SYSTOLIC BLOOD PRESSURE: 142 MMHG | OXYGEN SATURATION: 97 %

## 2025-01-08 DIAGNOSIS — J01.90 ACUTE SINUSITIS, RECURRENCE NOT SPECIFIED, UNSPECIFIED LOCATION: Primary | ICD-10-CM

## 2025-01-08 PROCEDURE — 3078F DIAST BP <80 MM HG: CPT | Performed by: FAMILY MEDICINE

## 2025-01-08 PROCEDURE — 1036F TOBACCO NON-USER: CPT | Performed by: FAMILY MEDICINE

## 2025-01-08 PROCEDURE — 99213 OFFICE O/P EST LOW 20 MIN: CPT | Performed by: FAMILY MEDICINE

## 2025-01-08 PROCEDURE — 1160F RVW MEDS BY RX/DR IN RCRD: CPT | Performed by: FAMILY MEDICINE

## 2025-01-08 PROCEDURE — 3077F SYST BP >= 140 MM HG: CPT | Performed by: FAMILY MEDICINE

## 2025-01-08 PROCEDURE — 1159F MED LIST DOCD IN RCRD: CPT | Performed by: FAMILY MEDICINE

## 2025-01-08 RX ORDER — AZITHROMYCIN 250 MG/1
TABLET, FILM COATED ORAL
Qty: 6 TABLET | Refills: 0 | Status: SHIPPED | OUTPATIENT
Start: 2025-01-08 | End: 2025-01-13

## 2025-01-08 NOTE — PROGRESS NOTES
"Hellen Rolon \"Terri\" is a 79 y.o. female who presents for Illness (Possible sinus infection x 10 days; Dr. Jarvis patient).  Pt of Dr Jarvis patient here c/o 10 day h/o sinus congestion, drainage, cough.  OTC meds somewhat helpful.  No fevers.  She feels like she is getting worse.  Having a lot of sinus pressure.              Objective   Visit Vitals  /78   Pulse 100      Physical Exam  Vitals reviewed.   Constitutional:       General: She is not in acute distress.  Cardiovascular:      Rate and Rhythm: Normal rate and regular rhythm.      Heart sounds: No murmur heard.  Pulmonary:      Effort: Pulmonary effort is normal. No respiratory distress.      Breath sounds: Normal breath sounds.   Skin:     General: Skin is warm and dry.   Neurological:      General: No focal deficit present.      Mental Status: She is alert. Mental status is at baseline.         Assessment/Plan   Problem List Items Addressed This Visit    None  Visit Diagnoses       Acute sinusitis, recurrence not specified, unspecified location    -  Primary    Relevant Medications    azithromycin (Zithromax) 250 mg tablet               Tanika Negron MD   "

## 2025-01-08 NOTE — PATIENT INSTRUCTIONS
Will treat with Zpak as she states that usually works well for her.  Discussed OTC medications that she may take as well.  Follow up if no improvement or if she worsens.

## 2025-01-17 DIAGNOSIS — G25.81 RESTLESS LEG SYNDROME: ICD-10-CM

## 2025-01-17 DIAGNOSIS — Z95.5 HISTORY OF CORONARY ARTERY STENT PLACEMENT: ICD-10-CM

## 2025-01-17 DIAGNOSIS — E11.9 TYPE 2 DIABETES MELLITUS WITHOUT COMPLICATION, WITHOUT LONG-TERM CURRENT USE OF INSULIN (MULTI): ICD-10-CM

## 2025-01-17 DIAGNOSIS — I10 HYPERTENSION, UNSPECIFIED TYPE: ICD-10-CM

## 2025-02-09 DIAGNOSIS — I10 HYPERTENSION, UNSPECIFIED TYPE: ICD-10-CM

## 2025-02-14 ENCOUNTER — TELEPHONE (OUTPATIENT)
Age: 80
End: 2025-02-14
Payer: COMMERCIAL

## 2025-02-14 DIAGNOSIS — J01.90 ACUTE SINUSITIS, RECURRENCE NOT SPECIFIED, UNSPECIFIED LOCATION: Primary | ICD-10-CM

## 2025-02-14 RX ORDER — AZITHROMYCIN 250 MG/1
TABLET, FILM COATED ORAL
Qty: 6 TABLET | Refills: 0 | Status: SHIPPED | OUTPATIENT
Start: 2025-02-14 | End: 2025-02-19

## 2025-02-14 NOTE — TELEPHONE ENCOUNTER
I do not believe that the patient has a sinus infection, usually sinus infections or nasal congestion and drainage that has been there for more than 10 days.  She is only been sick for 3 days, most likely has not one of the viruses it is going around.  I will send in a prescription for the azithromycin that she had had in January, she can hold onto that if she would like to and if not improving in the next 3 to 4 days then use the medication.  I would recommend over-the-counter nasal salt water irrigation, Tylenol for aches and pains, Robitussin or Delsym for cough and congestion.

## 2025-02-14 NOTE — TELEPHONE ENCOUNTER
PATIENT CALLED, PRODUCTIVE COUGH, TEMP 99.7,  CHEST, HEAD CONGESTION,HEADACHE. X 3 DAYS.   PATIENT SAYS SINUS INFECTION .   HAS THE SAME SYMPTOMS A COUPLE MONTHS AGO.   WOULD LIKE ANTIBIOTIC.   SHRIVERS

## 2025-02-17 RX ORDER — ATORVASTATIN CALCIUM 80 MG/1
80 TABLET, FILM COATED ORAL DAILY
Qty: 100 TABLET | Refills: 2 | OUTPATIENT
Start: 2025-02-17

## 2025-02-17 RX ORDER — LOSARTAN POTASSIUM 100 MG/1
100 TABLET ORAL DAILY
Qty: 100 TABLET | Refills: 2 | OUTPATIENT
Start: 2025-02-17

## 2025-02-17 RX ORDER — ROPINIROLE 1 MG/1
1 TABLET, FILM COATED ORAL DAILY
Qty: 100 TABLET | Refills: 2 | OUTPATIENT
Start: 2025-02-17

## 2025-03-06 DIAGNOSIS — E11.9 TYPE 2 DIABETES MELLITUS WITHOUT COMPLICATION, WITHOUT LONG-TERM CURRENT USE OF INSULIN (MULTI): ICD-10-CM

## 2025-03-11 RX ORDER — BLOOD SUGAR DIAGNOSTIC
1 STRIP MISCELLANEOUS 2 TIMES DAILY
Qty: 200 STRIP | Refills: 2 | Status: SHIPPED | OUTPATIENT
Start: 2025-03-11

## 2025-03-14 DIAGNOSIS — E11.9 TYPE 2 DIABETES MELLITUS WITHOUT COMPLICATION, WITHOUT LONG-TERM CURRENT USE OF INSULIN: ICD-10-CM

## 2025-03-14 DIAGNOSIS — I10 HYPERTENSION, UNSPECIFIED TYPE: ICD-10-CM

## 2025-03-14 DIAGNOSIS — G25.81 RESTLESS LEG SYNDROME: ICD-10-CM

## 2025-03-14 DIAGNOSIS — Z95.5 HISTORY OF CORONARY ARTERY STENT PLACEMENT: ICD-10-CM

## 2025-03-24 RX ORDER — ATORVASTATIN CALCIUM 80 MG/1
80 TABLET, FILM COATED ORAL DAILY
Qty: 60 TABLET | Refills: 5 | OUTPATIENT
Start: 2025-03-24

## 2025-03-24 RX ORDER — ROPINIROLE 1 MG/1
1 TABLET, FILM COATED ORAL DAILY
Qty: 60 TABLET | Refills: 5 | OUTPATIENT
Start: 2025-03-24

## 2025-03-25 DIAGNOSIS — E11.9 TYPE 2 DIABETES MELLITUS WITHOUT COMPLICATION, WITHOUT LONG-TERM CURRENT USE OF INSULIN: ICD-10-CM

## 2025-04-01 RX ORDER — METFORMIN HYDROCHLORIDE 500 MG/1
TABLET, EXTENDED RELEASE ORAL
Qty: 400 TABLET | Refills: 2 | OUTPATIENT
Start: 2025-04-01

## 2025-04-07 DIAGNOSIS — I10 HYPERTENSION, UNSPECIFIED TYPE: ICD-10-CM

## 2025-04-15 RX ORDER — LOSARTAN POTASSIUM 100 MG/1
100 TABLET ORAL DAILY
Qty: 90 TABLET | Refills: 3 | Status: SHIPPED | OUTPATIENT
Start: 2025-04-15

## 2025-05-08 DIAGNOSIS — Z95.5 HISTORY OF CORONARY ARTERY STENT PLACEMENT: ICD-10-CM

## 2025-05-08 DIAGNOSIS — E11.9 TYPE 2 DIABETES MELLITUS WITHOUT COMPLICATION, WITHOUT LONG-TERM CURRENT USE OF INSULIN: ICD-10-CM

## 2025-05-08 DIAGNOSIS — G25.81 RESTLESS LEG SYNDROME: ICD-10-CM

## 2025-05-08 DIAGNOSIS — I10 HYPERTENSION, UNSPECIFIED TYPE: ICD-10-CM

## 2025-05-08 RX ORDER — ATORVASTATIN CALCIUM 80 MG/1
80 TABLET, FILM COATED ORAL DAILY
Qty: 60 TABLET | Refills: 5 | Status: SHIPPED | OUTPATIENT
Start: 2025-05-08

## 2025-05-08 RX ORDER — ROPINIROLE 1 MG/1
1 TABLET, FILM COATED ORAL DAILY
Qty: 60 TABLET | Refills: 5 | Status: SHIPPED | OUTPATIENT
Start: 2025-05-08

## 2025-05-19 DIAGNOSIS — E11.9 TYPE 2 DIABETES MELLITUS WITHOUT COMPLICATION, WITHOUT LONG-TERM CURRENT USE OF INSULIN: ICD-10-CM

## 2025-06-06 ENCOUNTER — TELEPHONE (OUTPATIENT)
Age: 80
End: 2025-06-06
Payer: COMMERCIAL

## 2025-06-07 LAB
ALBUMIN SERPL-MCNC: 4.2 G/DL (ref 3.6–5.1)
ALP SERPL-CCNC: 66 U/L (ref 37–153)
ALT SERPL-CCNC: 19 U/L (ref 6–29)
ANION GAP SERPL CALCULATED.4IONS-SCNC: 7 MMOL/L (CALC) (ref 7–17)
AST SERPL-CCNC: 16 U/L (ref 10–35)
BILIRUB SERPL-MCNC: 0.4 MG/DL (ref 0.2–1.2)
BUN SERPL-MCNC: 27 MG/DL (ref 7–25)
CALCIUM SERPL-MCNC: 9.8 MG/DL (ref 8.6–10.4)
CHLORIDE SERPL-SCNC: 104 MMOL/L (ref 98–110)
CO2 SERPL-SCNC: 28 MMOL/L (ref 20–32)
CREAT SERPL-MCNC: 1.11 MG/DL (ref 0.6–0.95)
EGFRCR SERPLBLD CKD-EPI 2021: 50 ML/MIN/1.73M2
EST. AVERAGE GLUCOSE BLD GHB EST-MCNC: 177 MG/DL
EST. AVERAGE GLUCOSE BLD GHB EST-SCNC: 9.8 MMOL/L
GLUCOSE SERPL-MCNC: 148 MG/DL (ref 65–99)
HBA1C MFR BLD: 7.8 %
POTASSIUM SERPL-SCNC: 5 MMOL/L (ref 3.5–5.3)
PROT SERPL-MCNC: 6.4 G/DL (ref 6.1–8.1)
SODIUM SERPL-SCNC: 139 MMOL/L (ref 135–146)

## 2025-06-10 NOTE — TELEPHONE ENCOUNTER
Will fill out f2f at [facility].    Grecia Jarvis MD

## 2025-06-16 ENCOUNTER — APPOINTMENT (OUTPATIENT)
Age: 80
End: 2025-06-16
Payer: COMMERCIAL

## 2025-06-16 VITALS
DIASTOLIC BLOOD PRESSURE: 80 MMHG | BODY MASS INDEX: 32.94 KG/M2 | HEIGHT: 59 IN | SYSTOLIC BLOOD PRESSURE: 138 MMHG | HEART RATE: 60 BPM | WEIGHT: 163.4 LBS

## 2025-06-16 DIAGNOSIS — G89.29 CHRONIC LEFT-SIDED LOW BACK PAIN WITHOUT SCIATICA: ICD-10-CM

## 2025-06-16 DIAGNOSIS — N18.31 CHRONIC KIDNEY DISEASE, STAGE 3A (MULTI): ICD-10-CM

## 2025-06-16 DIAGNOSIS — C54.9 MALIGNANT NEOPLASM OF CORPUS UTERI, EXCEPT ISTHMUS (MULTI): ICD-10-CM

## 2025-06-16 DIAGNOSIS — K21.9 GASTROESOPHAGEAL REFLUX DISEASE, UNSPECIFIED WHETHER ESOPHAGITIS PRESENT: ICD-10-CM

## 2025-06-16 DIAGNOSIS — E11.65 TYPE 2 DIABETES MELLITUS WITH HYPERGLYCEMIA, WITHOUT LONG-TERM CURRENT USE OF INSULIN: ICD-10-CM

## 2025-06-16 DIAGNOSIS — I10 HYPERTENSION, UNSPECIFIED TYPE: ICD-10-CM

## 2025-06-16 DIAGNOSIS — M70.71 ILIOPSOAS BURSITIS OF RIGHT HIP: Primary | ICD-10-CM

## 2025-06-16 DIAGNOSIS — E11.59 TYPE 2 DIABETES MELLITUS WITH OTHER CIRCULATORY COMPLICATIONS: ICD-10-CM

## 2025-06-16 DIAGNOSIS — I48.91 ATRIAL FIBRILLATION, UNSPECIFIED TYPE (MULTI): ICD-10-CM

## 2025-06-16 DIAGNOSIS — M54.50 CHRONIC LEFT-SIDED LOW BACK PAIN WITHOUT SCIATICA: ICD-10-CM

## 2025-06-16 DIAGNOSIS — E11.9 TYPE 2 DIABETES MELLITUS WITHOUT COMPLICATION, WITHOUT LONG-TERM CURRENT USE OF INSULIN: ICD-10-CM

## 2025-06-16 PROCEDURE — G2211 COMPLEX E/M VISIT ADD ON: HCPCS | Performed by: STUDENT IN AN ORGANIZED HEALTH CARE EDUCATION/TRAINING PROGRAM

## 2025-06-16 PROCEDURE — 3079F DIAST BP 80-89 MM HG: CPT | Performed by: STUDENT IN AN ORGANIZED HEALTH CARE EDUCATION/TRAINING PROGRAM

## 2025-06-16 PROCEDURE — 99214 OFFICE O/P EST MOD 30 MIN: CPT | Performed by: STUDENT IN AN ORGANIZED HEALTH CARE EDUCATION/TRAINING PROGRAM

## 2025-06-16 PROCEDURE — 1160F RVW MEDS BY RX/DR IN RCRD: CPT | Performed by: STUDENT IN AN ORGANIZED HEALTH CARE EDUCATION/TRAINING PROGRAM

## 2025-06-16 PROCEDURE — 1036F TOBACCO NON-USER: CPT | Performed by: STUDENT IN AN ORGANIZED HEALTH CARE EDUCATION/TRAINING PROGRAM

## 2025-06-16 PROCEDURE — 1159F MED LIST DOCD IN RCRD: CPT | Performed by: STUDENT IN AN ORGANIZED HEALTH CARE EDUCATION/TRAINING PROGRAM

## 2025-06-16 PROCEDURE — 3075F SYST BP GE 130 - 139MM HG: CPT | Performed by: STUDENT IN AN ORGANIZED HEALTH CARE EDUCATION/TRAINING PROGRAM

## 2025-06-16 RX ORDER — AMLODIPINE BESYLATE 2.5 MG/1
2.5 TABLET ORAL
Qty: 90 TABLET | Refills: 3 | Status: SHIPPED | OUTPATIENT
Start: 2025-06-16 | End: 2025-06-16

## 2025-06-16 RX ORDER — METFORMIN HYDROCHLORIDE 500 MG/1
1000 TABLET, EXTENDED RELEASE ORAL
Qty: 360 TABLET | Refills: 3 | Status: SHIPPED | OUTPATIENT
Start: 2025-06-16

## 2025-06-16 RX ORDER — METFORMIN HYDROCHLORIDE 500 MG/1
1000 TABLET, EXTENDED RELEASE ORAL
Qty: 360 TABLET | Refills: 3 | Status: SHIPPED | OUTPATIENT
Start: 2025-06-16 | End: 2025-06-16

## 2025-06-16 RX ORDER — AMLODIPINE BESYLATE 2.5 MG/1
2.5 TABLET ORAL
Qty: 90 TABLET | Refills: 3 | Status: SHIPPED | OUTPATIENT
Start: 2025-06-16 | End: 2026-06-16

## 2025-06-16 RX ORDER — FAMOTIDINE 20 MG/1
20 TABLET, FILM COATED ORAL 2 TIMES DAILY
Qty: 180 TABLET | Refills: 3 | Status: SHIPPED | OUTPATIENT
Start: 2025-06-16 | End: 2025-12-13

## 2025-06-16 RX ORDER — FAMOTIDINE 20 MG/1
20 TABLET, FILM COATED ORAL 2 TIMES DAILY
Qty: 180 TABLET | Refills: 3 | Status: SHIPPED | OUTPATIENT
Start: 2025-06-16 | End: 2025-06-16

## 2025-06-16 NOTE — ASSESSMENT & PLAN NOTE
Chronic problem, known to provider, requires further workup and management  Most recent A1c 7.8  Continue metformin twice daily  Start Jardiance 10 mg daily

## 2025-06-16 NOTE — PROGRESS NOTES
"Subjective:  Quyen Rolon \"Terri\" Ольга is a 79 y.o. female who presents to clinic today for No chief complaint on file.      She notes that she has both aching and moving of her legs in bed at night. She is taking 1mg of requip nightly. She does think that this is helpful. She notes occasional pain in her legs during the day. Does move and be active in her appt during the day. Sometimes pain starts at 4pm. She previously rubbed them down with rubbing alcohol. They improve after an hour or so of laying in bed.  She does take tylenol, usually 3 or so a day. She takes her atorvastatin at noon.  In her ideal world she would like to sleep at 10pm.     Diabetes: previously on metformin and glipizide, only on metformin now. Has been eating more and has gained 7lbs. She notes overall feeling okay.    Review of Systems    Assessment/Plan:  Quyen Rolon \"Terri\" Ольга is a 79 y.o. female  who presents to clinic today to address the following issues:   1. Iliopsoas bursitis of right hip  Disability Placard      2. Chronic left-sided low back pain without sciatica  Disability Placard      3. Type 2 diabetes mellitus with hyperglycemia, without long-term current use of insulin  empagliflozin (Jardiance) 10 mg tablet    Hemoglobin A1c    Albumin-Creatinine Ratio, Urine Random    Hemoglobin A1c      4. Atrial fibrillation, unspecified type (Multi)        5. Type 2 diabetes mellitus with other circulatory complications        6. Malignant neoplasm of corpus uteri, except isthmus (Multi)        7. Chronic kidney disease, stage 3a (Multi)  Hemoglobin A1c    Albumin-Creatinine Ratio, Urine Random    Hemoglobin A1c      8. Hypertension, unspecified type  amLODIPine (Norvasc) 2.5 mg tablet    DISCONTINUED: amLODIPine (Norvasc) 2.5 mg tablet      9. Gastroesophageal reflux disease, unspecified whether esophagitis present  famotidine (Pepcid) 20 mg tablet    DISCONTINUED: famotidine (Pepcid) 20 mg tablet      10. Type 2 " diabetes mellitus without complication, without long-term current use of insulin  metFORMIN XR (Glucophage-XR) 500 mg 24 hr tablet    DISCONTINUED: metFORMIN XR (Glucophage-XR) 500 mg 24 hr tablet          Problem List Items Addressed This Visit       Malignant neoplasm of corpus uteri, except isthmus (Multi)    Overview   Uterine cancer approximately 2015 s/p total hysterectomy.         Current Assessment & Plan   Chronic problem, resolved, requires continued monitoring  No signs of vaginal bleeding or issues continue to observe         Type 2 diabetes mellitus without complication, without long-term current use of insulin    Overview   Hgb A1c 6.6 in June 2023.         Current Assessment & Plan   Chronic problem, known to provider, requires further workup and management  Most recent A1c 7.8  Continue metformin twice daily  Start Jardiance 10 mg daily         Relevant Medications    empagliflozin (Jardiance) 10 mg tablet    metFORMIN XR (Glucophage-XR) 500 mg 24 hr tablet    Acute left-sided low back pain without sciatica    Overview   Carito has had multiple episodes of low back pain over the last several years.  Typically she gets them every few months.  She has done physical therapy successfully in the past.         Hypertension    Overview   Blood pressure goal less than or equal to 140/90.         Current Assessment & Plan   Chronic problem, currently well-managed with blood pressure below goal         Relevant Medications    amLODIPine (Norvasc) 2.5 mg tablet    Iliopsoas bursitis of right hip - Primary    Relevant Orders    Disability Placard    Chronic kidney disease, stage 3a (Multi)    Current Assessment & Plan   Chronic problem, known to provider not  CKD stable  Obtain urine protein  Start Jardiance         Relevant Orders    Hemoglobin A1c    Albumin-Creatinine Ratio, Urine Random    RESOLVED: Atrial fibrillation, unspecified type (Multi)    Relevant Medications    amLODIPine (Norvasc) 2.5 mg tablet     "Type 2 diabetes mellitus with other circulatory complications     Other Visit Diagnoses         Gastroesophageal reflux disease, unspecified whether esophagitis present        Relevant Medications    famotidine (Pepcid) 20 mg tablet            Patient Instructions   Move your atorvastatin to a night time medication.     Follow up: 3 months due to medication changes    Return precautions discussed.  An After Visit Summary was given to the patient.  All questions were answered and patient in agreement with plan.    Objective:  /80   Pulse 60   Ht 1.499 m (4' 11\")   Wt 74.1 kg (163 lb 6.4 oz)   BMI 33.00 kg/m²     Physical Exam  Vitals and nursing note reviewed.   Constitutional:       General: She is not in acute distress.     Appearance: Normal appearance.   HENT:      Head: Normocephalic and atraumatic.      Mouth/Throat:      Mouth: Mucous membranes are moist.   Eyes:      General: No scleral icterus.        Right eye: No discharge.         Left eye: No discharge.      Extraocular Movements: Extraocular movements intact.      Conjunctiva/sclera: Conjunctivae normal.   Cardiovascular:      Rate and Rhythm: Normal rate. Rhythm irregular.   Pulmonary:      Effort: Pulmonary effort is normal. No respiratory distress.      Breath sounds: Normal breath sounds.   Abdominal:      General: Abdomen is flat.      Palpations: Abdomen is soft.   Musculoskeletal:      Right lower leg: Edema present.      Left lower leg: Edema present.      Comments: Bilateral trace to 1+ pitting edema   Skin:     General: Skin is warm and dry.   Neurological:      General: No focal deficit present.      Mental Status: She is alert and oriented to person, place, and time.   Psychiatric:         Attention and Perception: Attention normal.         Mood and Affect: Mood normal.         Speech: Speech normal.         Behavior: Behavior normal.         Cognition and Memory: Cognition and memory normal.         Judgment: Judgment normal. "         I spent 32 minutes in total time for this visit including all related clinical activities before, during, and after the visit excluding other billable activities/procedure time.     Grecia Jarvis MD

## 2025-06-16 NOTE — ASSESSMENT & PLAN NOTE
Chronic problem, resolved, requires continued monitoring  No signs of vaginal bleeding or issues continue to observe

## 2025-06-23 ENCOUNTER — TELEPHONE (OUTPATIENT)
Age: 80
End: 2025-06-23
Payer: COMMERCIAL

## 2025-06-23 DIAGNOSIS — E11.65 TYPE 2 DIABETES MELLITUS WITH HYPERGLYCEMIA, WITHOUT LONG-TERM CURRENT USE OF INSULIN: Primary | ICD-10-CM

## 2025-06-23 NOTE — TELEPHONE ENCOUNTER
She states she has a skip in her heart and the Jardiance made the skip hurt all day and she stopped taking it would like to be put back on the Glipaside she said she had no side effects with it.  Please advise

## 2025-06-23 NOTE — TELEPHONE ENCOUNTER
Can you call and clarify what she means by this? Is she checking her sugar or having side effects? How does she know its not working?    Grecia Jarvis MD

## 2025-06-24 RX ORDER — GLIPIZIDE 2.5 MG/1
2.5 TABLET, EXTENDED RELEASE ORAL DAILY
Qty: 30 TABLET | Refills: 11 | Status: SHIPPED | OUTPATIENT
Start: 2025-06-24 | End: 2026-06-24

## 2025-06-24 NOTE — TELEPHONE ENCOUNTER
I sent glipizide. Please inform her to monitor for hypoglycemia and to notify us if she has any. Okay to stop jardiance.    Grecia Jarvis MD

## 2025-07-07 ENCOUNTER — HOSPITAL ENCOUNTER (OUTPATIENT)
Facility: HOSPITAL | Age: 80
Setting detail: OBSERVATION
Discharge: NURSING, REHAB OR LONG-TERM CARE | End: 2025-07-08
Attending: EMERGENCY MEDICINE | Admitting: HOSPITALIST
Payer: COMMERCIAL

## 2025-07-07 ENCOUNTER — APPOINTMENT (OUTPATIENT)
Dept: RADIOLOGY | Facility: HOSPITAL | Age: 80
End: 2025-07-07
Payer: COMMERCIAL

## 2025-07-07 DIAGNOSIS — M25.551 PAIN OF RIGHT HIP: ICD-10-CM

## 2025-07-07 DIAGNOSIS — M54.50 ACUTE RIGHT-SIDED LOW BACK PAIN WITHOUT SCIATICA: Primary | ICD-10-CM

## 2025-07-07 PROBLEM — R53.1 WEAKNESS: Status: ACTIVE | Noted: 2025-07-07

## 2025-07-07 LAB
ALBUMIN SERPL BCP-MCNC: 3.9 G/DL (ref 3.4–5)
ALP SERPL-CCNC: 60 U/L (ref 33–136)
ALT SERPL W P-5'-P-CCNC: 17 U/L (ref 7–45)
ANION GAP SERPL CALC-SCNC: 10 MMOL/L (ref 10–20)
APPEARANCE UR: CLEAR
AST SERPL W P-5'-P-CCNC: 15 U/L (ref 9–39)
BASOPHILS # BLD AUTO: 0.05 X10*3/UL (ref 0–0.1)
BASOPHILS NFR BLD AUTO: 0.5 %
BILIRUB SERPL-MCNC: 0.5 MG/DL (ref 0–1.2)
BILIRUB UR STRIP.AUTO-MCNC: NEGATIVE MG/DL
BUN SERPL-MCNC: 25 MG/DL (ref 6–23)
CALCIUM SERPL-MCNC: 9.6 MG/DL (ref 8.6–10.3)
CHLORIDE SERPL-SCNC: 106 MMOL/L (ref 98–107)
CO2 SERPL-SCNC: 28 MMOL/L (ref 21–32)
COLOR UR: COLORLESS
CREAT SERPL-MCNC: 1.16 MG/DL (ref 0.5–1.05)
EGFRCR SERPLBLD CKD-EPI 2021: 48 ML/MIN/1.73M*2
EOSINOPHIL # BLD AUTO: 0.47 X10*3/UL (ref 0–0.4)
EOSINOPHIL NFR BLD AUTO: 4.6 %
ERYTHROCYTE [DISTWIDTH] IN BLOOD BY AUTOMATED COUNT: 13.2 % (ref 11.5–14.5)
GLUCOSE BLD MANUAL STRIP-MCNC: 104 MG/DL (ref 74–99)
GLUCOSE SERPL-MCNC: 145 MG/DL (ref 74–99)
GLUCOSE UR STRIP.AUTO-MCNC: ABNORMAL MG/DL
HCT VFR BLD AUTO: 33.4 % (ref 36–46)
HGB BLD-MCNC: 11.1 G/DL (ref 12–16)
IMM GRANULOCYTES # BLD AUTO: 0.02 X10*3/UL (ref 0–0.5)
IMM GRANULOCYTES NFR BLD AUTO: 0.2 % (ref 0–0.9)
KETONES UR STRIP.AUTO-MCNC: NEGATIVE MG/DL
LEUKOCYTE ESTERASE UR QL STRIP.AUTO: NEGATIVE
LYMPHOCYTES # BLD AUTO: 3.5 X10*3/UL (ref 0.8–3)
LYMPHOCYTES NFR BLD AUTO: 34.4 %
MCH RBC QN AUTO: 33.2 PG (ref 26–34)
MCHC RBC AUTO-ENTMCNC: 33.2 G/DL (ref 32–36)
MCV RBC AUTO: 100 FL (ref 80–100)
MONOCYTES # BLD AUTO: 1 X10*3/UL (ref 0.05–0.8)
MONOCYTES NFR BLD AUTO: 9.8 %
NEUTROPHILS # BLD AUTO: 5.14 X10*3/UL (ref 1.6–5.5)
NEUTROPHILS NFR BLD AUTO: 50.5 %
NITRITE UR QL STRIP.AUTO: NEGATIVE
NRBC BLD-RTO: 0 /100 WBCS (ref 0–0)
PH UR STRIP.AUTO: 7 [PH]
PLATELET # BLD AUTO: 255 X10*3/UL (ref 150–450)
POTASSIUM SERPL-SCNC: 4.9 MMOL/L (ref 3.5–5.3)
PROT SERPL-MCNC: 6.1 G/DL (ref 6.4–8.2)
PROT UR STRIP.AUTO-MCNC: NEGATIVE MG/DL
RBC # BLD AUTO: 3.34 X10*6/UL (ref 4–5.2)
RBC # UR STRIP.AUTO: NEGATIVE MG/DL
SODIUM SERPL-SCNC: 139 MMOL/L (ref 136–145)
SP GR UR STRIP.AUTO: 1.01
UROBILINOGEN UR STRIP.AUTO-MCNC: NORMAL MG/DL
WBC # BLD AUTO: 10.2 X10*3/UL (ref 4.4–11.3)

## 2025-07-07 PROCEDURE — 2500000005 HC RX 250 GENERAL PHARMACY W/O HCPCS: Performed by: EMERGENCY MEDICINE

## 2025-07-07 PROCEDURE — 99285 EMERGENCY DEPT VISIT HI MDM: CPT | Mod: 25 | Performed by: EMERGENCY MEDICINE

## 2025-07-07 PROCEDURE — 81003 URINALYSIS AUTO W/O SCOPE: CPT | Performed by: HOSPITALIST

## 2025-07-07 PROCEDURE — G0378 HOSPITAL OBSERVATION PER HR: HCPCS

## 2025-07-07 PROCEDURE — 97161 PT EVAL LOW COMPLEX 20 MIN: CPT | Mod: GP | Performed by: PHYSICAL THERAPIST

## 2025-07-07 PROCEDURE — 96372 THER/PROPH/DIAG INJ SC/IM: CPT | Performed by: HOSPITALIST

## 2025-07-07 PROCEDURE — 73502 X-RAY EXAM HIP UNI 2-3 VIEWS: CPT | Mod: RIGHT SIDE | Performed by: RADIOLOGY

## 2025-07-07 PROCEDURE — 72125 CT NECK SPINE W/O DYE: CPT | Performed by: RADIOLOGY

## 2025-07-07 PROCEDURE — 2500000001 HC RX 250 WO HCPCS SELF ADMINISTERED DRUGS (ALT 637 FOR MEDICARE OP): Performed by: HOSPITALIST

## 2025-07-07 PROCEDURE — 72131 CT LUMBAR SPINE W/O DYE: CPT | Performed by: RADIOLOGY

## 2025-07-07 PROCEDURE — 97165 OT EVAL LOW COMPLEX 30 MIN: CPT | Mod: GO

## 2025-07-07 PROCEDURE — 72125 CT NECK SPINE W/O DYE: CPT

## 2025-07-07 PROCEDURE — 2500000001 HC RX 250 WO HCPCS SELF ADMINISTERED DRUGS (ALT 637 FOR MEDICARE OP): Performed by: EMERGENCY MEDICINE

## 2025-07-07 PROCEDURE — 70450 CT HEAD/BRAIN W/O DYE: CPT

## 2025-07-07 PROCEDURE — 2500000004 HC RX 250 GENERAL PHARMACY W/ HCPCS (ALT 636 FOR OP/ED): Performed by: EMERGENCY MEDICINE

## 2025-07-07 PROCEDURE — 80053 COMPREHEN METABOLIC PANEL: CPT | Performed by: EMERGENCY MEDICINE

## 2025-07-07 PROCEDURE — 85025 COMPLETE CBC W/AUTO DIFF WBC: CPT | Performed by: EMERGENCY MEDICINE

## 2025-07-07 PROCEDURE — 96360 HYDRATION IV INFUSION INIT: CPT | Mod: 59

## 2025-07-07 PROCEDURE — 73502 X-RAY EXAM HIP UNI 2-3 VIEWS: CPT | Mod: RT

## 2025-07-07 PROCEDURE — 96372 THER/PROPH/DIAG INJ SC/IM: CPT | Performed by: EMERGENCY MEDICINE

## 2025-07-07 PROCEDURE — 99223 1ST HOSP IP/OBS HIGH 75: CPT | Performed by: HOSPITALIST

## 2025-07-07 PROCEDURE — 2500000004 HC RX 250 GENERAL PHARMACY W/ HCPCS (ALT 636 FOR OP/ED): Performed by: HOSPITALIST

## 2025-07-07 PROCEDURE — 82947 ASSAY GLUCOSE BLOOD QUANT: CPT | Mod: 59

## 2025-07-07 PROCEDURE — 70450 CT HEAD/BRAIN W/O DYE: CPT | Performed by: RADIOLOGY

## 2025-07-07 PROCEDURE — 36415 COLL VENOUS BLD VENIPUNCTURE: CPT | Performed by: EMERGENCY MEDICINE

## 2025-07-07 PROCEDURE — 72131 CT LUMBAR SPINE W/O DYE: CPT

## 2025-07-07 RX ORDER — ACETAMINOPHEN 650 MG/1
650 SUPPOSITORY RECTAL EVERY 4 HOURS PRN
Status: DISCONTINUED | OUTPATIENT
Start: 2025-07-07 | End: 2025-07-08 | Stop reason: HOSPADM

## 2025-07-07 RX ORDER — ENOXAPARIN SODIUM 100 MG/ML
40 INJECTION SUBCUTANEOUS EVERY 24 HOURS
Status: DISCONTINUED | OUTPATIENT
Start: 2025-07-07 | End: 2025-07-08 | Stop reason: HOSPADM

## 2025-07-07 RX ORDER — FAMOTIDINE 20 MG/1
20 TABLET, FILM COATED ORAL NIGHTLY
Status: DISCONTINUED | OUTPATIENT
Start: 2025-07-07 | End: 2025-07-08 | Stop reason: HOSPADM

## 2025-07-07 RX ORDER — DEXTROSE 50 % IN WATER (D50W) INTRAVENOUS SYRINGE
25
Status: DISCONTINUED | OUTPATIENT
Start: 2025-07-07 | End: 2025-07-08 | Stop reason: HOSPADM

## 2025-07-07 RX ORDER — DEXTROSE 50 % IN WATER (D50W) INTRAVENOUS SYRINGE
12.5
Status: DISCONTINUED | OUTPATIENT
Start: 2025-07-07 | End: 2025-07-08 | Stop reason: HOSPADM

## 2025-07-07 RX ORDER — ACETAMINOPHEN 325 MG/1
650 TABLET ORAL EVERY 4 HOURS PRN
Status: DISCONTINUED | OUTPATIENT
Start: 2025-07-07 | End: 2025-07-08 | Stop reason: HOSPADM

## 2025-07-07 RX ORDER — LANOLIN ALCOHOL/MO/W.PET/CERES
400 CREAM (GRAM) TOPICAL 2 TIMES DAILY
Status: DISCONTINUED | OUTPATIENT
Start: 2025-07-07 | End: 2025-07-08 | Stop reason: HOSPADM

## 2025-07-07 RX ORDER — LIDOCAINE 560 MG/1
1 PATCH PERCUTANEOUS; TOPICAL; TRANSDERMAL DAILY
Status: DISCONTINUED | OUTPATIENT
Start: 2025-07-07 | End: 2025-07-08 | Stop reason: HOSPADM

## 2025-07-07 RX ORDER — AMLODIPINE BESYLATE 5 MG/1
TABLET ORAL
Status: DISPENSED
Start: 2025-07-07 | End: 2025-07-07

## 2025-07-07 RX ORDER — ROPINIROLE 1 MG/1
1 TABLET, FILM COATED ORAL NIGHTLY
Status: DISCONTINUED | OUTPATIENT
Start: 2025-07-07 | End: 2025-07-08 | Stop reason: HOSPADM

## 2025-07-07 RX ORDER — METOPROLOL SUCCINATE 25 MG/1
25 TABLET, EXTENDED RELEASE ORAL 2 TIMES DAILY
Status: DISCONTINUED | OUTPATIENT
Start: 2025-07-07 | End: 2025-07-08 | Stop reason: HOSPADM

## 2025-07-07 RX ORDER — AMLODIPINE BESYLATE 5 MG/1
2.5 TABLET ORAL DAILY
Status: DISCONTINUED | OUTPATIENT
Start: 2025-07-07 | End: 2025-07-07

## 2025-07-07 RX ORDER — ONDANSETRON HYDROCHLORIDE 2 MG/ML
4 INJECTION, SOLUTION INTRAVENOUS EVERY 8 HOURS PRN
Status: DISCONTINUED | OUTPATIENT
Start: 2025-07-07 | End: 2025-07-08 | Stop reason: HOSPADM

## 2025-07-07 RX ORDER — NAPROXEN SODIUM 220 MG/1
81 TABLET, FILM COATED ORAL DAILY
Status: DISCONTINUED | OUTPATIENT
Start: 2025-07-07 | End: 2025-07-08 | Stop reason: HOSPADM

## 2025-07-07 RX ORDER — AMLODIPINE BESYLATE 5 MG/1
2.5 TABLET ORAL DAILY
Status: DISCONTINUED | OUTPATIENT
Start: 2025-07-07 | End: 2025-07-08

## 2025-07-07 RX ORDER — FERROUS SULFATE 325(65) MG
1 TABLET ORAL
Status: DISCONTINUED | OUTPATIENT
Start: 2025-07-08 | End: 2025-07-08 | Stop reason: HOSPADM

## 2025-07-07 RX ORDER — ONDANSETRON 4 MG/1
4 TABLET, ORALLY DISINTEGRATING ORAL EVERY 8 HOURS PRN
Status: DISCONTINUED | OUTPATIENT
Start: 2025-07-07 | End: 2025-07-08 | Stop reason: HOSPADM

## 2025-07-07 RX ORDER — ATORVASTATIN CALCIUM 80 MG/1
80 TABLET, FILM COATED ORAL NIGHTLY
Status: DISCONTINUED | OUTPATIENT
Start: 2025-07-07 | End: 2025-07-08 | Stop reason: HOSPADM

## 2025-07-07 RX ORDER — KETOROLAC TROMETHAMINE 30 MG/ML
15 INJECTION, SOLUTION INTRAMUSCULAR; INTRAVENOUS ONCE
Status: COMPLETED | OUTPATIENT
Start: 2025-07-07 | End: 2025-07-07

## 2025-07-07 RX ORDER — LIDOCAINE 560 MG/1
1 PATCH PERCUTANEOUS; TOPICAL; TRANSDERMAL ONCE
Status: COMPLETED | OUTPATIENT
Start: 2025-07-07 | End: 2025-07-08

## 2025-07-07 RX ORDER — ACETAMINOPHEN 160 MG/5ML
650 SOLUTION ORAL EVERY 4 HOURS PRN
Status: DISCONTINUED | OUTPATIENT
Start: 2025-07-07 | End: 2025-07-08 | Stop reason: HOSPADM

## 2025-07-07 RX ORDER — POLYETHYLENE GLYCOL 3350 17 G/17G
17 POWDER, FOR SOLUTION ORAL DAILY
Status: DISCONTINUED | OUTPATIENT
Start: 2025-07-07 | End: 2025-07-08 | Stop reason: HOSPADM

## 2025-07-07 RX ORDER — LOSARTAN POTASSIUM 100 MG/1
100 TABLET ORAL ONCE
Status: COMPLETED | OUTPATIENT
Start: 2025-07-07 | End: 2025-07-07

## 2025-07-07 RX ORDER — SODIUM CHLORIDE 9 MG/ML
100 INJECTION, SOLUTION INTRAVENOUS CONTINUOUS
Status: DISCONTINUED | OUTPATIENT
Start: 2025-07-07 | End: 2025-07-08 | Stop reason: HOSPADM

## 2025-07-07 RX ORDER — INSULIN LISPRO 100 [IU]/ML
0-5 INJECTION, SOLUTION INTRAVENOUS; SUBCUTANEOUS
Status: DISCONTINUED | OUTPATIENT
Start: 2025-07-08 | End: 2025-07-08 | Stop reason: HOSPADM

## 2025-07-07 RX ORDER — LOSARTAN POTASSIUM 100 MG/1
100 TABLET ORAL DAILY
Status: DISCONTINUED | OUTPATIENT
Start: 2025-07-08 | End: 2025-07-08 | Stop reason: HOSPADM

## 2025-07-07 RX ORDER — ACETAMINOPHEN 325 MG/1
975 TABLET ORAL ONCE
Status: COMPLETED | OUTPATIENT
Start: 2025-07-07 | End: 2025-07-07

## 2025-07-07 RX ADMIN — ASPIRIN 81 MG: 81 TABLET, CHEWABLE ORAL at 18:50

## 2025-07-07 RX ADMIN — ACETAMINOPHEN 650 MG: 325 TABLET ORAL at 20:33

## 2025-07-07 RX ADMIN — SODIUM CHLORIDE 100 ML/HR: 0.9 INJECTION, SOLUTION INTRAVENOUS at 18:50

## 2025-07-07 RX ADMIN — LIDOCAINE 4% 1 PATCH: 40 PATCH TOPICAL at 13:33

## 2025-07-07 RX ADMIN — KETOROLAC TROMETHAMINE 15 MG: 30 INJECTION, SOLUTION INTRAMUSCULAR at 10:10

## 2025-07-07 RX ADMIN — AMLODIPINE BESYLATE 2.5 MG: 5 TABLET ORAL at 11:01

## 2025-07-07 RX ADMIN — ROPINIROLE 1 MG: 1 TABLET, FILM COATED ORAL at 20:33

## 2025-07-07 RX ADMIN — ATORVASTATIN CALCIUM 80 MG: 80 TABLET, FILM COATED ORAL at 20:33

## 2025-07-07 RX ADMIN — LOSARTAN POTASSIUM 100 MG: 100 TABLET, FILM COATED ORAL at 11:01

## 2025-07-07 RX ADMIN — Medication 1 TABLET: at 20:33

## 2025-07-07 RX ADMIN — FAMOTIDINE 20 MG: 20 TABLET, FILM COATED ORAL at 20:33

## 2025-07-07 RX ADMIN — METOPROLOL SUCCINATE 25 MG: 25 TABLET, EXTENDED RELEASE ORAL at 20:33

## 2025-07-07 RX ADMIN — ENOXAPARIN SODIUM 40 MG: 100 INJECTION SUBCUTANEOUS at 18:50

## 2025-07-07 RX ADMIN — SODIUM CHLORIDE 1000 ML: 0.9 INJECTION, SOLUTION INTRAVENOUS at 11:01

## 2025-07-07 RX ADMIN — ACETAMINOPHEN 975 MG: 325 TABLET ORAL at 13:33

## 2025-07-07 SDOH — SOCIAL STABILITY: SOCIAL INSECURITY
WITHIN THE LAST YEAR, HAVE YOU BEEN RAPED OR FORCED TO HAVE ANY KIND OF SEXUAL ACTIVITY BY YOUR PARTNER OR EX-PARTNER?: NO

## 2025-07-07 SDOH — ECONOMIC STABILITY: HOUSING INSECURITY: AT ANY TIME IN THE PAST 12 MONTHS, WERE YOU HOMELESS OR LIVING IN A SHELTER (INCLUDING NOW)?: NO

## 2025-07-07 SDOH — ECONOMIC STABILITY: INCOME INSECURITY: IN THE PAST 12 MONTHS HAS THE ELECTRIC, GAS, OIL, OR WATER COMPANY THREATENED TO SHUT OFF SERVICES IN YOUR HOME?: NO

## 2025-07-07 SDOH — SOCIAL STABILITY: SOCIAL INSECURITY
WITHIN THE LAST YEAR, HAVE YOU BEEN KICKED, HIT, SLAPPED, OR OTHERWISE PHYSICALLY HURT BY YOUR PARTNER OR EX-PARTNER?: NO

## 2025-07-07 SDOH — ECONOMIC STABILITY: HOUSING INSECURITY: IN THE LAST 12 MONTHS, WAS THERE A TIME WHEN YOU WERE NOT ABLE TO PAY THE MORTGAGE OR RENT ON TIME?: NO

## 2025-07-07 SDOH — SOCIAL STABILITY: SOCIAL INSECURITY: DO YOU FEEL ANYONE HAS EXPLOITED OR TAKEN ADVANTAGE OF YOU FINANCIALLY OR OF YOUR PERSONAL PROPERTY?: YES

## 2025-07-07 SDOH — ECONOMIC STABILITY: HOUSING INSECURITY: IN THE PAST 12 MONTHS, HOW MANY TIMES HAVE YOU MOVED WHERE YOU WERE LIVING?: 0

## 2025-07-07 SDOH — HEALTH STABILITY: PHYSICAL HEALTH: ON AVERAGE, HOW MANY MINUTES DO YOU ENGAGE IN EXERCISE AT THIS LEVEL?: 20 MIN

## 2025-07-07 SDOH — ECONOMIC STABILITY: TRANSPORTATION INSECURITY: IN THE PAST 12 MONTHS, HAS LACK OF TRANSPORTATION KEPT YOU FROM MEDICAL APPOINTMENTS OR FROM GETTING MEDICATIONS?: NO

## 2025-07-07 SDOH — HEALTH STABILITY: MENTAL HEALTH
DO YOU FEEL STRESS - TENSE, RESTLESS, NERVOUS, OR ANXIOUS, OR UNABLE TO SLEEP AT NIGHT BECAUSE YOUR MIND IS TROUBLED ALL THE TIME - THESE DAYS?: NOT AT ALL

## 2025-07-07 SDOH — ECONOMIC STABILITY: FOOD INSECURITY: WITHIN THE PAST 12 MONTHS, THE FOOD YOU BOUGHT JUST DIDN'T LAST AND YOU DIDN'T HAVE MONEY TO GET MORE.: NEVER TRUE

## 2025-07-07 SDOH — ECONOMIC STABILITY: FOOD INSECURITY: WITHIN THE PAST 12 MONTHS, YOU WORRIED THAT YOUR FOOD WOULD RUN OUT BEFORE YOU GOT THE MONEY TO BUY MORE.: NEVER TRUE

## 2025-07-07 SDOH — SOCIAL STABILITY: SOCIAL INSECURITY: HAVE YOU HAD THOUGHTS OF HARMING ANYONE ELSE?: NO

## 2025-07-07 SDOH — SOCIAL STABILITY: SOCIAL NETWORK
IN A TYPICAL WEEK, HOW MANY TIMES DO YOU TALK ON THE PHONE WITH FAMILY, FRIENDS, OR NEIGHBORS?: MORE THAN THREE TIMES A WEEK

## 2025-07-07 SDOH — HEALTH STABILITY: PHYSICAL HEALTH: ON AVERAGE, HOW MANY DAYS PER WEEK DO YOU ENGAGE IN MODERATE TO STRENUOUS EXERCISE (LIKE A BRISK WALK)?: 1 DAY

## 2025-07-07 SDOH — ECONOMIC STABILITY: FOOD INSECURITY: HOW HARD IS IT FOR YOU TO PAY FOR THE VERY BASICS LIKE FOOD, HOUSING, MEDICAL CARE, AND HEATING?: NOT HARD AT ALL

## 2025-07-07 SDOH — HEALTH STABILITY: MENTAL HEALTH: EXPERIENCED ANY OF THE FOLLOWING LIFE EVENTS: ASSAULT WITH A WEAPON

## 2025-07-07 SDOH — SOCIAL STABILITY: SOCIAL INSECURITY: WITHIN THE LAST YEAR, HAVE YOU BEEN HUMILIATED OR EMOTIONALLY ABUSED IN OTHER WAYS BY YOUR PARTNER OR EX-PARTNER?: NO

## 2025-07-07 SDOH — SOCIAL STABILITY: SOCIAL INSECURITY: ARE YOU OR HAVE YOU BEEN THREATENED OR ABUSED PHYSICALLY, EMOTIONALLY, OR SEXUALLY BY ANYONE?: NO

## 2025-07-07 SDOH — SOCIAL STABILITY: SOCIAL INSECURITY: WITHIN THE LAST YEAR, HAVE YOU BEEN AFRAID OF YOUR PARTNER OR EX-PARTNER?: NO

## 2025-07-07 SDOH — SOCIAL STABILITY: SOCIAL INSECURITY: DOES ANYONE TRY TO KEEP YOU FROM HAVING/CONTACTING OTHER FRIENDS OR DOING THINGS OUTSIDE YOUR HOME?: NO

## 2025-07-07 SDOH — SOCIAL STABILITY: SOCIAL NETWORK: HOW OFTEN DO YOU GET TOGETHER WITH FRIENDS OR RELATIVES?: MORE THAN THREE TIMES A WEEK

## 2025-07-07 SDOH — SOCIAL STABILITY: SOCIAL INSECURITY: DO YOU FEEL UNSAFE GOING BACK TO THE PLACE WHERE YOU ARE LIVING?: NO

## 2025-07-07 SDOH — SOCIAL STABILITY: SOCIAL INSECURITY: ARE THERE ANY APPARENT SIGNS OF INJURIES/BEHAVIORS THAT COULD BE RELATED TO ABUSE/NEGLECT?: NO

## 2025-07-07 SDOH — SOCIAL STABILITY: SOCIAL INSECURITY: ABUSE: ADULT

## 2025-07-07 SDOH — SOCIAL STABILITY: SOCIAL INSECURITY: WERE YOU ABLE TO COMPLETE ALL THE BEHAVIORAL HEALTH SCREENINGS?: YES

## 2025-07-07 SDOH — SOCIAL STABILITY: SOCIAL INSECURITY: HAS ANYONE EVER THREATENED TO HURT YOUR FAMILY OR YOUR PETS?: NO

## 2025-07-07 SDOH — SOCIAL STABILITY: SOCIAL INSECURITY: HAVE YOU HAD ANY THOUGHTS OF HARMING ANYONE ELSE?: NO

## 2025-07-07 ASSESSMENT — COGNITIVE AND FUNCTIONAL STATUS - GENERAL
WALKING IN HOSPITAL ROOM: A LITTLE
TOILETING: A LITTLE
DRESSING REGULAR UPPER BODY CLOTHING: A LITTLE
DRESSING REGULAR LOWER BODY CLOTHING: A LITTLE
CLIMB 3 TO 5 STEPS WITH RAILING: TOTAL
MOBILITY SCORE: 18
HELP NEEDED FOR BATHING: A LOT
DRESSING REGULAR UPPER BODY CLOTHING: A LITTLE
MOVING TO AND FROM BED TO CHAIR: A LITTLE
CLIMB 3 TO 5 STEPS WITH RAILING: A LOT
HELP NEEDED FOR BATHING: A LITTLE
MOVING FROM LYING ON BACK TO SITTING ON SIDE OF FLAT BED WITH BEDRAILS: A LOT
DAILY ACTIVITIY SCORE: 16
DRESSING REGULAR LOWER BODY CLOTHING: A LOT
MOVING TO AND FROM BED TO CHAIR: A LITTLE
DRESSING REGULAR UPPER BODY CLOTHING: A LITTLE
STANDING UP FROM CHAIR USING ARMS: A LITTLE
WALKING IN HOSPITAL ROOM: A LITTLE
TURNING FROM BACK TO SIDE WHILE IN FLAT BAD: A LITTLE
WALKING IN HOSPITAL ROOM: A LITTLE
TOILETING: A LOT
PERSONAL GROOMING: A LITTLE
MOBILITY SCORE: 19
TOILETING: A LITTLE
DAILY ACTIVITIY SCORE: 20
STANDING UP FROM CHAIR USING ARMS: A LITTLE
CLIMB 3 TO 5 STEPS WITH RAILING: A LOT
DAILY ACTIVITIY SCORE: 20
TURNING FROM BACK TO SIDE WHILE IN FLAT BAD: A LITTLE
HELP NEEDED FOR BATHING: A LITTLE
PATIENT BASELINE BEDBOUND: NO
MOBILITY SCORE: 15
DRESSING REGULAR LOWER BODY CLOTHING: A LITTLE
STANDING UP FROM CHAIR USING ARMS: A LITTLE
MOVING TO AND FROM BED TO CHAIR: A LITTLE

## 2025-07-07 ASSESSMENT — LIFESTYLE VARIABLES
SKIP TO QUESTIONS 9-10: 1
PRESCIPTION_ABUSE_PAST_12_MONTHS: NO
SUBSTANCE_ABUSE_PAST_12_MONTHS: NO
AUDIT-C TOTAL SCORE: 0
AUDIT-C TOTAL SCORE: 0
HOW MANY STANDARD DRINKS CONTAINING ALCOHOL DO YOU HAVE ON A TYPICAL DAY: PATIENT DOES NOT DRINK
HOW OFTEN DO YOU HAVE 6 OR MORE DRINKS ON ONE OCCASION: NEVER
HOW OFTEN DO YOU HAVE A DRINK CONTAINING ALCOHOL: NEVER

## 2025-07-07 ASSESSMENT — ACTIVITIES OF DAILY LIVING (ADL)
LACK_OF_TRANSPORTATION: NO
HEARING - LEFT EAR: FUNCTIONAL
FEEDING YOURSELF: INDEPENDENT
JUDGMENT_ADEQUATE_SAFELY_COMPLETE_DAILY_ACTIVITIES: YES
BATHING: NEEDS ASSISTANCE
PATIENT'S MEMORY ADEQUATE TO SAFELY COMPLETE DAILY ACTIVITIES?: YES
ADL_ASSISTANCE: INDEPENDENT
DRESSING YOURSELF: INDEPENDENT
BATHING: NEEDS ASSISTANCE
WALKS IN HOME: INDEPENDENT
GROOMING: INDEPENDENT
PATIENT'S MEMORY ADEQUATE TO SAFELY COMPLETE DAILY ACTIVITIES?: YES
JUDGMENT_ADEQUATE_SAFELY_COMPLETE_DAILY_ACTIVITIES: YES
TOILETING: NEEDS ASSISTANCE
ADEQUATE_TO_COMPLETE_ADL: YES
GROOMING: INDEPENDENT
LACK_OF_TRANSPORTATION: NO
HEARING - LEFT EAR: FUNCTIONAL
WALKS IN HOME: INDEPENDENT
FEEDING YOURSELF: INDEPENDENT
LACK_OF_TRANSPORTATION: NO
ASSISTIVE_DEVICE: WALKER;EYEGLASSES;CANE
HEARING - RIGHT EAR: FUNCTIONAL
ADL_ASSISTANCE: INDEPENDENT
ASSISTIVE_DEVICE: CANE;EYEGLASSES
ADEQUATE_TO_COMPLETE_ADL: YES
BATHING_ASSISTANCE: MAXIMAL
DRESSING YOURSELF: INDEPENDENT
HEARING - RIGHT EAR: FUNCTIONAL

## 2025-07-07 ASSESSMENT — PAIN - FUNCTIONAL ASSESSMENT
PAIN_FUNCTIONAL_ASSESSMENT: 0-10

## 2025-07-07 ASSESSMENT — PATIENT HEALTH QUESTIONNAIRE - PHQ9
2. FEELING DOWN, DEPRESSED OR HOPELESS: NOT AT ALL
1. LITTLE INTEREST OR PLEASURE IN DOING THINGS: NOT AT ALL
SUM OF ALL RESPONSES TO PHQ9 QUESTIONS 1 & 2: 0

## 2025-07-07 ASSESSMENT — PAIN SCALES - GENERAL
PAINLEVEL_OUTOF10: 9
PAINLEVEL_OUTOF10: 9
PAINLEVEL_OUTOF10: 5 - MODERATE PAIN
PAINLEVEL_OUTOF10: 3
PAINLEVEL_OUTOF10: 9
PAINLEVEL_OUTOF10: 7

## 2025-07-07 ASSESSMENT — PAIN DESCRIPTION - DESCRIPTORS: DESCRIPTORS: BURNING;ACHING

## 2025-07-07 ASSESSMENT — PAIN DESCRIPTION - ORIENTATION: ORIENTATION: RIGHT;LEFT;LOWER

## 2025-07-07 ASSESSMENT — PAIN DESCRIPTION - LOCATION
LOCATION: BACK
LOCATION: BACK

## 2025-07-07 NOTE — ED PROVIDER NOTES
HPI   Chief Complaint   Patient presents with    Back Pain     Pt bib by ems for c/o back spasms. States she fell 5 days ago, and has had back pain since, worsening this am       79-year-old female presenting with lower back pain.  She states she fell on Wednesday but she lost her balance.  She landed on her back.  She states she also hit the left side of her head by her ear.  She denies LOC but she is on baby aspirin daily.  Patient states that she initially did not have very much pain but over the course of several days it has been worse.  Yesterday she states she went shopping and had no difficulty walking.  Has been taking Tylenol for pain.  Patient states today after getting over taking her home medications and Tylenol she laid back down and was unable to get up.  She describes the pain as burning and aching in her lower back and into her tailbone.  She states her neck and head pain have improved.  Denies dizziness.  She states she cannot take pain medication because she it makes her sick even with nausea medicine.  She states she takes the Tylenol mostly for the pain.              Patient History   Medical History[1]  Surgical History[2]  Family History[3]  Social History[4]    Physical Exam   ED Triage Vitals [07/07/25 0830]   Temperature Heart Rate Respirations BP   36.6 °C (97.8 °F) 63 18 171/88      Pulse Ox Temp Source Heart Rate Source Patient Position   95 % Temporal -- --      BP Location FiO2 (%)     -- --       Physical Exam  Vitals and nursing note reviewed.   HENT:      Head: Normocephalic and atraumatic.      Mouth/Throat:      Mouth: Mucous membranes are moist.   Eyes:      Extraocular Movements: Extraocular movements intact.      Pupils: Pupils are equal, round, and reactive to light.   Cardiovascular:      Rate and Rhythm: Normal rate and regular rhythm.   Pulmonary:      Effort: Pulmonary effort is normal.   Abdominal:      Palpations: Abdomen is soft.   Musculoskeletal:      Cervical back:  Normal range of motion and neck supple.        Back:    Skin:     General: Skin is warm and dry.   Neurological:      General: No focal deficit present.      Mental Status: She is alert and oriented to person, place, and time.   Psychiatric:         Mood and Affect: Mood normal.           ED Course & MDM   Diagnoses as of 07/07/25 1714   Acute right-sided low back pain without sciatica   Pain of right hip                 No data recorded                                 Medical Decision Making  79-year-old female presenting with back pain as chief complaint.  She does have some mild neck pain on the right as well.  No focal neurologic deficits or lateralizing signs or symptoms.  Differential includes but is not limited to contusion, hip fracture, patella fracture, intracranial hemorrhage, C-spine fracture.  Patient reports inability to ambulate therefore she was given IV Toradol due to her allergies and we will plan to admit.  Because it obtain a CBC and CMP.  CT brain, cervical spine, lumbar spine, right hip x-ray will also be obtained due to location of the patient's pain.  CT brain and cervical spine were negative for acute findings.  Right hip x-ray interpreted by myself shows no acute fracture or subluxation.  CT of the lumbar spine shows nondisplaced fracture involving the right lateral disc osteophyte complex along the inferior endplate of L2.  Given the patient's inability to ambulate I attempted to admit her for placement in skilled nursing.  Hospitalist is trying to get PT and OT done in the ER so we can get her straight to the nursing home from the ER.  Patient was given a dose of Tylenol and a liter of normal saline.  Patient was seen and evaluated by PT and OT however they are able to place the patient today due to the time.  Patient will be admitted to the hospitalist in stable condition for observation.        Procedure  Procedures         [1]   Past Medical History:  Diagnosis Date    CAD (coronary  artery disease)     Diabetes mellitus (Multi)     High cholesterol     Hypertension     Restless leg     Uterine cancer (Multi)    [2]   Past Surgical History:  Procedure Laterality Date    APPENDECTOMY      CAROTID STENT      EYE SURGERY Bilateral     Cataract    FOOT SURGERY Left     HYSTERECTOMY      NECK SURGERY      Carotid artery    REVISION TOTAL HIP ARTHROPLASTY Right     TOTAL KNEE ARTHROPLASTY Bilateral     TUBAL LIGATION     [3]   Family History  Problem Relation Name Age of Onset    Heart attack Mother      Hypertension Mother      Diabetes Mother      Cervical cancer Sister      Cancer Brother 3 brothers 1 alive     Prostate cancer Brother 3 brothers 1 alive    [4]   Social History  Tobacco Use    Smoking status: Never     Passive exposure: Never    Smokeless tobacco: Never   Vaping Use    Vaping status: Never Used   Substance Use Topics    Alcohol use: Never    Drug use: Never        Herve Campbell,   07/07/25 0296

## 2025-07-07 NOTE — PROGRESS NOTES
07/07/25 1543   Discharge Planning   Living Arrangements Alone   Support Systems Children;/  (Has PASSPORT services, but apparently no aides available at this time.)   Assistance Needed Needs new PASSPORT aides   Type of Residence Private residence  ([facility] [address] (subsidized in [place]))   Number of Stairs to Enter Residence 0   Number of Stairs Within Residence 0   Do you have animals or pets at home? No   Who is requesting discharge planning? Provider   Home or Post Acute Services Post acute facilities (Rehab/SNF/etc)   Type of Post Acute Facility Services Skilled nursing   Expected Discharge Disposition SNF   Does the patient need discharge transport arranged? Yes   Financial Resource Strain   How hard is it for you to pay for the very basics like food, housing, medical care, and heating? Not hard   Housing Stability   In the last 12 months, was there a time when you were not able to pay the mortgage or rent on time? N   In the past 12 months, how many times have you moved where you were living? 0   At any time in the past 12 months, were you homeless or living in a shelter (including now)? N   Transportation Needs   In the past 12 months, has lack of transportation kept you from medical appointments or from getting medications? no   In the past 12 months, has lack of transportation kept you from meetings, work, or from getting things needed for daily living? No   Patient Choice   Patient / Family choosing to utilize agency / facility established prior to hospitalization No   Stroke Family Assessment   Stroke Family Assessment Needed No   Intensity of Service   Intensity of Service >30 min     Social Work consult requested for hopeful SNF placement straight from ED. Per PT/OT evals, patient is appropriate for same. SW reviewed patient's chart and met with patient at bedside to perform initial assessment. Patient stated that, prior to current hospitalization, patient  "received some PASSPORT services with BRITT/Davi managing same. However, patient does not currently have any aides, as friend who was assisting \"stopped showing up\" at some point. Patient has home-delivered meals. Patient has the following DME for use in patient's home: cane, rollator, Cpap. Patient's PCP is Dr. Jarvis; patient's pharmacy of choice is [facility] in [place] for delivery, and [facility] also for delivery. Patient expressed concern that patient's purse was left in emergency squad, including patient's phone. Squad apparently left message for patient's dtr/Lyric who lives only half a mile away from patient. SW to phone same; Await response. Patient hopes to discharge to SNF for skilled rehab; SW emphasized patient's freedom of choice of provider, and offered list of options. Patient's facility of choice is [facility], and patient agreed for several other referrals to be sent as well. SW sent same and phoned [facility] who will consider referral.  - 1600: SW left message for patient's PASSPORT BRITT/Davi at the [facility]; Awaits return call.  - 1650: [facility] is able to accept patient into a semi-private room. Patient is in agreement with same, as is patient's dtr/Lyric who returned SW's call. [facility] Auth Team submitted for insurance auth which is now pending. Medical team updated. Plan for patient is to discharge to [facility] pending insurance auth. Care Transitions to follow and assist. PRETTY Srinivasan    "

## 2025-07-07 NOTE — CARE PLAN
Problem: Pain - Adult  Goal: Verbalizes/displays adequate comfort level or baseline comfort level  Outcome: Progressing     Problem: Safety - Adult  Goal: Free from fall injury  Outcome: Progressing     Problem: Discharge Planning  Goal: Discharge to home or other facility with appropriate resources  Outcome: Progressing     Problem: Chronic Conditions and Co-morbidities  Goal: Patient's chronic conditions and co-morbidity symptoms are monitored and maintained or improved  Outcome: Progressing     Problem: Nutrition  Goal: Nutrient intake appropriate for maintaining nutritional needs  Outcome: Progressing     Problem: Fall/Injury  Goal: Not fall by end of shift  Outcome: Progressing  Goal: Be free from injury by end of the shift  Outcome: Progressing  Goal: Verbalize understanding of personal risk factors for fall in the hospital  Outcome: Progressing  Goal: Verbalize understanding of risk factor reduction measures to prevent injury from fall in the home  Outcome: Progressing  Goal: Use assistive devices by end of the shift  Outcome: Progressing  Goal: Pace activities to prevent fatigue by end of the shift  Outcome: Progressing   The patient's goals for the shift include      The clinical goals for the shift include no falls, rest    Over the shift, the patient did not make progress toward the following goals. Barriers to progression include . Recommendations to address these barriers include .

## 2025-07-07 NOTE — H&P
"History Of Present Illness  Quyen Rolon \"Terri\" is a 79 y.o. female presenting with acute on chronic low back pain after a fall 5 days ago.  Pain was getting worse and finally patient was unable to get up and hence decided to visit hospital.  Denies fever chills nausea vomiting bowel bladder incontinence or sensory or motor deficits.  She has restless legs.  Denies any joint pains or skin rash otherwise.  No loss of consciousness but hit her head with fall.  No active bleeding.  No focal weakness numbness paralysis or paresthesias or tingling.  No flank pain hematuria dysuria.  No chest pain shortness of breath palpitations or syncope.  No headache neck pain.  No sweating tremors nervousness or lightheadedness.  CT lumbar spine with nondisplaced fracture involving right lateral disc osteophyte complex along inferior endplate of L2.  No compression fractures.  Advanced multilevel degenerative changes  CT head negative for acute process  She had right hip pain and x-ray did not show any acute process similarly CT neck and cervical area with no acute process.    Past Medical History  Medical History[1]  Coronary artery disease  Diabetes mellitus type 2  Hyperlipidemia  Hypertension  Restless legs  Uterine cancer  Surgical History  Surgical History[2]     Social History  She reports that she has never smoked. She has never been exposed to tobacco smoke. She has never used smokeless tobacco. She reports that she does not drink alcohol and does not use drugs.    Family History  Family History[3]     Allergies  Morphine, Ampicillin, Guaifenesin, Iodinated contrast media, Meperidine, Pollen extracts, Propoxyphene n-acetaminophen, Ceftriaxone, and Latex    Review of Systems  All other 12 point review of systems negative except HPI  Physical Exam   General Appearance: AAO x 3,   Skin: skin color pink, warm, and dry; no suspicious rashes or lesions  Eyes : PERRL, EOM's intact  ENT: mucous membranes pink and moist  Neck: " "normocephalic  Respiratory: lungs clear to auscultation anteriorly; no wheezing, rhonchi, or crackles.   Heart: regular rate and rhythm.   Abdomen: Nondistended, positive bowel sounds x4, soft,  nontender  Extremities: no edema   Peripheral pulses: normal x4 extremities  Neuro: alert, coherent and conversant, no focal motor deficits  Last Recorded Vitals  Blood pressure 153/62, pulse 70, temperature 36.6 °C (97.8 °F), temperature source Temporal, resp. rate 16, height (!) 1.499 m (4' 11\"), weight 73.5 kg (162 lb), SpO2 95%.    Relevant Results  Results for orders placed or performed during the hospital encounter of 07/07/25 (from the past 24 hours)   CBC and Auto Differential   Result Value Ref Range    WBC 10.2 4.4 - 11.3 x10*3/uL    nRBC 0.0 0.0 - 0.0 /100 WBCs    RBC 3.34 (L) 4.00 - 5.20 x10*6/uL    Hemoglobin 11.1 (L) 12.0 - 16.0 g/dL    Hematocrit 33.4 (L) 36.0 - 46.0 %     80 - 100 fL    MCH 33.2 26.0 - 34.0 pg    MCHC 33.2 32.0 - 36.0 g/dL    RDW 13.2 11.5 - 14.5 %    Platelets 255 150 - 450 x10*3/uL    Neutrophils % 50.5 40.0 - 80.0 %    Immature Granulocytes %, Automated 0.2 0.0 - 0.9 %    Lymphocytes % 34.4 13.0 - 44.0 %    Monocytes % 9.8 2.0 - 10.0 %    Eosinophils % 4.6 0.0 - 6.0 %    Basophils % 0.5 0.0 - 2.0 %    Neutrophils Absolute 5.14 1.60 - 5.50 x10*3/uL    Immature Granulocytes Absolute, Automated 0.02 0.00 - 0.50 x10*3/uL    Lymphocytes Absolute 3.50 (H) 0.80 - 3.00 x10*3/uL    Monocytes Absolute 1.00 (H) 0.05 - 0.80 x10*3/uL    Eosinophils Absolute 0.47 (H) 0.00 - 0.40 x10*3/uL    Basophils Absolute 0.05 0.00 - 0.10 x10*3/uL   Comprehensive Metabolic Panel   Result Value Ref Range    Glucose 145 (H) 74 - 99 mg/dL    Sodium 139 136 - 145 mmol/L    Potassium 4.9 3.5 - 5.3 mmol/L    Chloride 106 98 - 107 mmol/L    Bicarbonate 28 21 - 32 mmol/L    Anion Gap 10 10 - 20 mmol/L    Urea Nitrogen 25 (H) 6 - 23 mg/dL    Creatinine 1.16 (H) 0.50 - 1.05 mg/dL    eGFR 48 (L) >60 mL/min/1.73m*2    " Calcium 9.6 8.6 - 10.3 mg/dL    Albumin 3.9 3.4 - 5.0 g/dL    Alkaline Phosphatase 60 33 - 136 U/L    Total Protein 6.1 (L) 6.4 - 8.2 g/dL    AST 15 9 - 39 U/L    Bilirubin, Total 0.5 0.0 - 1.2 mg/dL    ALT 17 7 - 45 U/L       CT lumbar spine wo IV contrast  Result Date: 7/7/2025  Interpreted By:  Jaswant Chapman, STUDY: CT LUMBAR SPINE WO IV CONTRAST;  7/7/2025 10:16 am   INDICATION: Signs/Symptoms:fall/trauma.     COMPARISON: None.   ACCESSION NUMBER(S): [id number]   ORDERING CLINICIAN: LISA SEGOVIA   TECHNIQUE: Unenhanced axial images were obtained through the lumbar spine. The axial data was utilized to reconstruct images in sagittal and coronal planes.   FINDINGS: There is sacralization of L5. Nondisplaced fracture noted involving a right lateral disc osteophyte complex along the inferior endplate of L2, best seen on coronal images, for example, image 38 of 92 of series 6. There are advanced multilevel degenerative changes which include disc space narrowing, endplate spurring, endplate sclerosis and irregularity, posterior disc osteophyte complexes, and facet hypertrophy. There is subluxation at several levels which is most likely related to facet degenerative disease. This is most conspicuous at L4 on L5. Facet joints demonstrate a normal alignment. No evidence of spondylolysis. No suspicious lytic or blastic lesions. No paraspinal hematoma is evident.       Nondisplaced fracture involving a right lateral disc osteophyte complex along the inferior endplate of L2. No compression fractures. Advanced multilevel degenerative changes.   MACRO: None.   Signed by: Jaswant Chapman 7/7/2025 10:29 AM Dictation workstation:   [id number]    CT cervical spine wo IV contrast  Result Date: 7/7/2025  Interpreted By:  Jaswant Chapman, STUDY: CT CERVICAL SPINE WO IV CONTRAST;  7/7/2025 10:16 am   INDICATION: Signs/Symptoms:pain/fall.     COMPARISON: None.   ACCESSION NUMBER(S): [id number]   ORDERING CLINICIAN: LISA SEGOVIA    TECHNIQUE: Unenhanced axial images were obtained through the cervical spine. The axial data was utilized to reconstruct images in sagittal and coronal planes.   FINDINGS: No acute fracture is identified. Degenerative changes include disc space narrowing, endplate spurring, endplate sclerosis, uncovertebral spurring, posterior disc osteophyte complexes, and facet hypertrophy. Mild subluxation at a few levels most likely relates to facet degenerative disease. Facet joints demonstrate a normal alignment. Prevertebral soft tissues are within normal limits. No lytic or blastic lesion is noted.       No evidence of an acute fracture. Degenerative changes.   MACRO: None.   Signed by: Jaswant Chapman 7/7/2025 10:24 AM Dictation workstation:   [id number]    CT head wo IV contrast  Result Date: 7/7/2025  Interpreted By:  Jaswant Chapman, STUDY: CT HEAD WO IV CONTRAST;  7/7/2025 10:16 am   INDICATION: Signs/Symptoms:head injury.     COMPARISON: None.   ACCESSION NUMBER(S): [id number]   ORDERING CLINICIAN: LISA SEGOVIA   TECHNIQUE: Unenhanced images were obtained through the brain.   FINDINGS: Ventricles are normal in size and position. There are some vague areas of decreased attenuation in the white matter, nonspecific, but commonly associated with small vessel ischemic disease. There is no mass effect or midline shift. No acute intracranial hemorrhage is identified. No extra-axial fluid collections are seen. No intraparenchymal mass lesions are identified.  Bone windows demonstrate no evidence of an acute calvarial fracture.       No evidence of an acute intracranial process.   MACRO: None.   Signed by: Jaswant Chapman 7/7/2025 10:22 AM Dictation workstation:   [id number]    XR hip right with pelvis when performed 2 or 3 views  Result Date: 7/7/2025  Interpreted By:  Jaswant Chapman, STUDY: XR HIP RIGHT WITH PELVIS WHEN PERFORMED 2 OR 3 VIEWS;  7/7/2025 10:11 am   INDICATION: Signs/Symptoms:pain/fall.     COMPARISON: None.   ACCESSION  NUMBER(S): [id number]   ORDERING CLINICIAN: LISA SEGOVIA   FINDINGS: No acute fracture is identified. No dislocation is seen. There are no lytic or blastic lesions. Right hip arthroplasty is noted and intact. No surrounding lucencies. Sacroiliac joints appear symmetric. There is no diastasis of the pubic symphysis. Left hip degenerative changes noted.       No radiographic evidence of an acute fracture.   MACRO: None.   Signed by: Jaswant Chapman 7/7/2025 10:14 AM Dictation workstation:   [id number]      Scheduled medications  Scheduled Medications[4]  Continuous medications  Continuous Medications[5]  PRN medications  PRN Medications[6]           Assessment & Plan  Weakness    79-year-old female with history of  Coronary artery disease  Diabetes mellitus type 2  Hyperlipidemia  Hypertension  Restless legs  Uterine cancer  Presenting with  Generalized weakness deconditioning, ambulatory dysfunction  Acute on chronic low back pain  Fall  Plan  Follow case management for placement to rehab  Resume home meds  Fall, aspiration, seizure precautions  Hydrate  Daily CBC BMP  Follow vitals  Patient stated that she has skipped beat, denies any active cardiac complaints  To monitor clinically  Patient to follow cardiology outpatient at this time  Tylenol and lidocaine for back pain  PT OT  Antihypertensives on losartan, amlodipine, Jardiance, metoprolol  Atorvastatin 80 Mg daily, Requip, famotidine  On aspirin 81 mg daily for coronary artery disease/CVA prophylaxis  Iron supplement for anemia sent magnesium supplement sent  DNR CCA  Lovenox for DVT prophylaxis        Ron Florentino MD         [1]   Past Medical History:  Diagnosis Date    CAD (coronary artery disease)     Diabetes mellitus (Multi)     High cholesterol     Hypertension     Restless leg     Uterine cancer (Multi)    [2]   Past Surgical History:  Procedure Laterality Date    APPENDECTOMY      CAROTID STENT      EYE SURGERY Bilateral     Cataract    FOOT SURGERY  Left     HYSTERECTOMY      NECK SURGERY      Carotid artery    REVISION TOTAL HIP ARTHROPLASTY Right     TOTAL KNEE ARTHROPLASTY Bilateral     TUBAL LIGATION     [3]   Family History  Problem Relation Name Age of Onset    Heart attack Mother      Hypertension Mother      Diabetes Mother      Cervical cancer Sister      Cancer Brother 3 brothers 1 alive     Prostate cancer Brother 3 brothers 1 alive    [4] amLODIPine, 2.5 mg, oral, Daily  lidocaine, 1 patch, transdermal, Once  [5]    [6]

## 2025-07-07 NOTE — PROGRESS NOTES
"Occupational Therapy  Evaluation    Patient Name: Quyen Rolon \"Terri\"  MRN: [id number]  Today's Date: 7/7/2025  Time Calculation  Start Time: 1327  Stop Time: 1350  Time Calculation (min): 23 min    Current Problem: No diagnosis found.    OT order: OT eval and treat   Referred by: Herve Campbell DO  Reason for referral: impaired mobility;79-year-old female presenting with lower back pain.  She states she fell on Wednesday but she lost her balance.  She landed on her back.  She states she also hit the left side of her head by her ear.  She denies LOC but she is on baby aspirin daily.  Patient states that she initially did not have very much pain but over the course of several days it has been worse.  Yesterday she states she went shopping and had no difficulty walking.  Past medical history related to rehab: CAD (coronary artery disease)      Diabetes mellitus (Multi)     High cholesterol     Hypertension     Restless leg     Uterine cancer (Multi);     xray= -compression Fx L-spine, - neck fx, -hip/pelvic fx; +non-displaced lateral disc osteophyte fracture    Precautions:   Medical Precautions: Fall precautions    ASSESSMENT  OT Assessment: Pt is a 79 year olf female presenting with increase low back pain, (-)CT for head, neck and spine fractures. Pt states PLOF, independent with ADLs/iADLs and used device PRN. Upon eval, pt limited by pain. Educated on lpog roll technique with min-mod A, and transfers with CGA to FWW>recliner with increasedverbal cues for hand placement and fww safety. Pt should benefit from continued skilled OT services to increase progress. Rec moderate intensity when medically cleared. Pt with Decreased ADL status, Decreased cognition, Decreased endurance, Decreased functional mobility  Prognosis: Good  Barriers to discharge home: Caregiver assistance, Physical needs  Patient lives alone and/or does not have reliable caregiver assistance  Intermittent mobility assistance needed, " Intermittent ADL assistance needed, High falls risk due to function or environment     Tolerance: Patient limited by pain    PLAN  Frequency: 3 times per week (during this acute inpatient hospitalization)  Treatment Interventions: ADL retraining, UE strengthening/ROM, Endurance training, Patient/family training, Equipment evaluation/education, Functional transfer training  Discharge Recommendations: Moderate intensity level of continued care (Based on current functional status and rehab potential, patient is anticipated to tolerate and benefit from 5 or more days per week of skilled rehabilitative therapy after discharge from this acute inpatient hospitalization.)  OT OK to discharge: Yes (when medically cleared)    GENERAL VISIT INFORMATION   Start of session communication: Bedside nurse  End of session communication: Bedside nurse  Family/caregiver present:    Caregiver feedback:    Co-Treatment: PT  Reason for co-treatment: to maximize patient safety with mobility while addressing discipline-specific goals   Position Pt Received:  Bed, 2 rail up, Alarm off, not on at start of session (CONOR dumont)  End of session position: Up in chair, Alarm off, not on at start of session (call light in reach end of session)    SUBJECTIVE  Home Living:  Type of Home: Apartment  Lives With: Alone  Home Adaptive Equipment: Cane (rollator)  Home Layout: One level  Home Access:  (incline/grade entrance)  Bathroom Shower/Tub: Walk-in shower  Bathroom Equipment: Grab bars in shower, Shower chair with back  Home Living Comments: sleeps in regualr bed     Prior Level of Function:  ADL Assistance: Independent  Homemaking Assistance: Needs assistance (does have a friend help with cleaning; does her own grocery shopping and uses rollator to wheel her laundry to laundromat within her community living as well as to get her mail)  Ambulatory Assistance: Independent (with rollator in community;  cane for shopping, no device in apt but states  sometimes she uses rollator if her back is hurting)  Prior Function Comments: +drives;    Pain:  Assessment: 0-10  Score: 9  Type: Acute pain  Location: Back  Interventions: Distraction, Repositioned      OBJECTIVE  Vital Signs:  Vitals Session: During OT  Vital Signs Comment: no concerns    Cognition:  Overall Cognitive Status: Within Functional Limits  Safety/Judgement: Exceptions to WFL             Current ADL function:   EATING:  Independent     GROOMING: Minimal     BATHING: Maximal     UB DRESSING: Minimal     LB DRESSING: Moderate (demo'd difficult to reach RLE)     TOILETING: Moderate    ADL comments:  anticipated    Activity Tolerance:  Endurance: Decreased tolerance for upright activites    Bed Mobility/Transfers:   Bed Mobility  Bed Mobility: Yes  Bed Mobility 1  Bed Mobility 1: Supine to sitting  Level of Assistance 1: Minimum assistance  Transfers  Transfer: Yes  Transfer 1  Technique 1: Sit to stand, Stand to sit  Transfer Device 1: Gait belt, Walker  Transfer Level of Assistance 1: Contact guard, Moderate verbal cues  Trials/Comments 1: instruction and cues for hand placement and safety; from elevated gurney to recliner    Ambulation/Gait Training:  Functional Mobility  Functional Mobility Performed: Yes  Functional Mobility 1  Surface 1: Level tile  Device 1: Rolling walker  Functional Mobility Support Devices: Gait belt  Assistance 1: Contact guard  Comments 1: No LOB noted, increased pain wit h mobility. verbal cues for FWW placement/safety    Sitting Balance:  Static Sitting Balance  Static Sitting-Balance Support: Feet supported, Feet unsupported  Static Sitting-Level of Assistance: Modified Independent  Dynamic Sitting Balance  Dynamic Sitting-Balance Support: Feet unsupported  Dynamic Sitting-Level of Assistance: Close supervision    Standing Balance:  Static Standing Balance  Static Standing-Level of Assistance: Contact guard  Static Standing-Comment/Number of Minutes: FWW  Dynamic Standing  Balance  Dynamic Standing-Level of Assistance: Contact guard  Dynamic Standing-Comments: FWW      Sensation:  Light Touch: No apparent deficits    Strength:  Strength Comments: BUE WFL    Coordination:  Movements are Fluid and Coordinated: Yes     Hand Function:  Hand Function  Gross Grasp: Functional  Coordination: Functional    Extremities: RUE   RUE : Within Functional Limits and LUE   LUE: Within Functional Limits    Outcome Measures: [facility] Daily Activity   Putting on and taking off regular lower body clothing: A lot  Bathing (including washing, rinsing, drying): A lot  Putting on and taking off regular upper body clothing: A little  Toileting, which includes using toilet, bedpan or urinal: A lot  Taking care of personal grooming such as brushing teeth: A little   Eating Meals: None   Daily Activity - Total Score: 16    EDUCATION:     Education Documentation  Body Mechanics, taught by Marni Mistry OT at 7/7/2025  2:56 PM.  Learner: Patient  Readiness: Eager  Method: Explanation  Response: Verbalizes Understanding  Comment: ADL/functional mobility retrianing    Precautions, taught by Marni Mistry OT at 7/7/2025  2:56 PM.  Learner: Patient  Readiness: Eager  Method: Explanation  Response: Verbalizes Understanding  Comment: ADL/functional mobility retrianing    ADL Training, taught by Marni Mistry OT at 7/7/2025  2:56 PM.  Learner: Patient  Readiness: Eager  Method: Explanation  Response: Verbalizes Understanding  Comment: ADL/functional mobility retrianing    Education Comments  No comments found.        Goals:   Encounter Problems       Encounter Problems (Active)       ADLs       Patient with complete lower body dressing with stand by assist level of assistance with PRN adaptive equipment while supported sitting       Start:  07/07/25    Expected End:  07/21/25            Patient will complete toileting including hygiene clothing management/hygiene with stand by assist level of assistance       Start:   07/07/25    Expected End:  07/21/25               BALANCE       Pt will maintain dynamic standing balance during ADL task with stand by assist level of assistance in order to demonstrate decreased risk of falling and improved postural control.       Start:  07/07/25    Expected End:  07/21/25               TRANSFERS       Patient will perform bed mobility supervision level of assistance and use of log roll technique in order to improve safety and independence with mobility       Start:  07/07/25    Expected End:  07/21/25            Patient will complete sit to stand transfer with supervision level of assistance and front wheeled walker in order to improve safety and prepare for out of bed mobility.       Start:  07/07/25    Expected End:  07/21/25

## 2025-07-07 NOTE — PROGRESS NOTES
Brief Note: Pending PT/OT evan,  to follow in the event that patient is appropriate for SNF placement directly from the ED. Care Transitions to follow and assist as patient's needs become clearer. PRETTY Srinivasan

## 2025-07-07 NOTE — PROGRESS NOTES
"Physical Therapy    Physical Therapy Evaluation    Patient Name: Quyen Rolon \"Terri\"  MRN: [id number]  Today's Date: 7/7/2025   Time Calculation  Start Time: 1328  Stop Time: 1350  Time Calculation (min): 22 min    Assessment/Plan  Skilled PT intervention is indicated due to patient presents with deficits in bed mobility, transfers, gait, strength, activity tolerance, and safety awareness.  Patient with recent fall (several falls in past 2 months) and c/o 9/10 back pain limiting her mobility.  Patient is typically independent with mobility in her apt (no device) and use of rollator for community ambulation. Patient now is needing assist for mobility and a FWW.   Recommend continued physical therapy intervention at a  moderate intensity following hospitalization to improve strength, balance, mobility and reduce fall risk.  Continue ambulation with FWW.    PT Assessment  PT Assessment Results: Decreased strength, Decreased endurance, Impaired balance, Decreased mobility, Decreased safety awareness, Pain  Rehab Prognosis: Good  Barriers to Discharge Home: Caregiver assistance, Physical needs  Caregiver Assistance: Patient lives alone and/or does not have reliable caregiver assistance  Physical Needs: Ambulating household distances limited by function/safety, 24hr mobility assistance needed, 24hr ADL assistance needed, High falls risk due to function or environment  Evaluation/Treatment Tolerance: Patient limited by pain  End of Session Communication: Bedside nurse  End of Session Patient Position: Up in chair, Alarm off, not on at start of session (call light in reach end of session)  IP OR SWING BED PT PLAN  Inpatient or Swing Bed: Inpatient  PT Plan  Treatment/Interventions: Bed mobility, Transfer training, Gait training, Balance training, Strengthening, Endurance training, Therapeutic exercise, Therapeutic activity, Home exercise program  PT Plan: Ongoing PT  PT Frequency: 4 times per week  PT Discharge " Recommendations: Moderate intensity level of continued care  PT Recommended Transfer Status: Assist x1  PT - OK to Discharge: Yes (once medically appropriate and safe DC plan in place)    Subjective     Current Problem:  Problem List[1]    General Visit Information:  General  Reason for Referral: impaired mobility;79-year-old female presenting with lower back pain.  She states she fell on Wednesday but she lost her balance.  She landed on her back.  She states she also hit the left side of her head by her ear.  She denies LOC but she is on baby aspirin daily.  Patient states that she initially did not have very much pain but over the course of several days it has been worse.  Yesterday she states she went shopping and had no difficulty walking.  Referred By: Herve Campbell DO  Past Medical History Relevant to Rehab: CAD (coronary artery disease)      Diabetes mellitus (Multi)     High cholesterol     Hypertension     Restless leg     Uterine cancer (Multi);     xray= -compression Fx L-spine, - neck fx, -hip/pelvic fx; +non-displaced lateral disc osteophyte fracture  Co-Treatment: OT  Co-Treatment Reason: to maximize patient safety with mobility while addressing discipline-specific goals  Prior to Session Communication: Bedside nurse  Patient Position Received: Bed, 2 rail up, Alarm off, not on at start of session (CONOR dumont)  General Comment: patient agreeable to assessment    Home Living:  Home Living  Type of Home: Apartment  Lives With: Alone  Home Adaptive Equipment: Cane (rollator)  Home Layout: One level  Home Access:  (incline/grade entrance)  Bathroom Shower/Tub: Walk-in shower  Bathroom Equipment: Grab bars in shower, Shower chair with back  Home Living Comments: sleeps in regular bed    Prior Level of Function:  Prior Function Per Pt/Caregiver Report  ADL Assistance: Independent  Homemaking Assistance: Needs assistance (does have a friend help with cleaning; does her own grocery shopping and uses  rollator to wheel her laundry to laundromat within her community living as well as to get her mail)  Ambulatory Assistance: Independent (with rollator in community;  cane for shopping, no device in apt but states sometimes she uses rollator if her back is hurting)  Prior Function Comments: +drives;    Precautions:  Precautions  Medical Precautions: Fall precautions    Vital Signs:  Vital Signs  Vitals Session: During PT  Vital Signs Comment: no concerns  Objective     Pain:  Pain Assessment  Pain Assessment: 0-10  0-10 (Numeric) Pain Score: 9  Pain Type: Acute pain  Pain Location: Back  Pain Orientation: Lower  Pain Radiating Towards: no radiating pain  Pain Interventions: Repositioned, Medication (See MAR)    Cognition:  Cognition  Overall Cognitive Status: Within Functional Limits  Safety/Judgement: Exceptions to WFL    General Assessments:  General Observation  General Observation: pleasant and cooperative   Activity Tolerance  Endurance: Decreased tolerance for upright activites  Sensation  Light Touch: No apparent deficits  Strength  Strength Comments: LEs grossly >/=3+/5     Coordination  Movements are Fluid and Coordinated: Yes  Postural Control  Postural Control: Within Functional Limits  Posture Comment: bent over posture  Static Sitting Balance  Static Sitting-Balance Support: Feet unsupported, Bilateral upper extremity supported  Static Sitting-Level of Assistance: Close supervision  Static Standing Balance  Static Standing-Balance Support: Bilateral upper extremity supported (with FWW)  Static Standing-Level of Assistance: Contact guard  Dynamic Standing Balance  Dynamic Standing-Balance Support: Bilateral upper extremity supported (with FWW)  Dynamic Standing-Level of Assistance: Contact guard  Dynamic Standing-Balance: Turning  Dynamic Standing-Comments: cues to slow down and not turn to fast and to far at once (reduce twist)    Functional Assessments:     Bed Mobility  Bed Mobility: Yes  Bed Mobility  1  Bed Mobility 1: Supine to sitting  Level of Assistance 1: Minimum assistance  Bed Mobility Comments 1: bed flat, no rail; reaching for therapist to support to log roll to L side and then sidelying to sit with hand-held assist  Transfers  Transfer: Yes  Transfer 1  Technique 1: Sit to stand, Stand to sit  Transfer Device 1: Gait belt, Walker  Transfer Level of Assistance 1: Contact guard, Moderate verbal cues  Trials/Comments 1: instruction and cues for hand placement and safety; from elevated gurney to recliner  Ambulation/Gait Training  Ambulation/Gait Training Performed: Yes  Ambulation/Gait Training 1  Surface 1: Level tile  Device 1: Rolling walker  Gait Support Devices: Gait belt  Assistance 1: Contact guard, Minimal verbal cues  Quality of Gait 1: Diminished heel strike, Inconsistent stride length, Forward flexed posture, Decreased step length  Comments/Distance (ft) 1: 11'; cues to not twist/slow down with turning;  no s/s of pain wiht ambulation but reports 9/10 pain          Outcome Measures:  [facility] Basic Mobility  Turning from your back to your side while in a flat bed without using bedrails: A lot  Moving from lying on your back to sitting on the side of a flat bed without using bedrails: A little  Moving to and from bed to chair (including a wheelchair): A little  Standing up from a chair using your arms (e.g. wheelchair or bedside chair): A little  To walk in hospital room: A little  Climbing 3-5 steps with railing: Total  Basic Mobility - Total Score: 15                            Goals:  Encounter Problems       Encounter Problems (Active)       PT Problem       PT Goal 1       Start:  07/07/25    Expected End:  07/21/25       Quyen Rolon will be independent with bed mobility for supine to and from sitting EOB without use of rail and bed flat           PT Goal 2       Start:  07/07/25    Expected End:  07/21/25       Quyen Rolon will demonstrate good safety awareness with transfers  and mobility and with use of assistive device (proper hand placement).            PT Goal 3       Start:  07/07/25    Expected End:  07/21/25       Quyen Rolon will transfer sit to and from stand using least restrictive assistive device SBA            PT Goal 4       Start:  07/07/25    Expected End:  07/21/25       Quyen Rolon will ambulate 100ft with assistive device , level surface, good balance, steady SBA                Education Documentation  Mobility Training, taught by Brittani Moore, PT at 7/7/2025  2:34 PM.  Learner: Patient  Readiness: Acceptance  Method: Explanation  Response: Verbalizes Understanding, Demonstrated Understanding, Needs Reinforcement  Comment: safety with mobility, transfers, safety with device    Education Comments  No comments found.               [1]   Patient Active Problem List  Diagnosis    Urinary incontinence    Malignant neoplasm of corpus uteri, except isthmus (Multi)    Type 2 diabetes mellitus without complication, without long-term current use of insulin    Acute left-sided low back pain without sciatica    Bladder prolapse, female, acquired    History of coronary artery stent placement    Hypertension    Abscess    Iliopsoas bursitis of right hip    Chronic kidney disease, stage 3a (Multi)    Type 2 diabetes mellitus with other circulatory complications

## 2025-07-08 VITALS
HEIGHT: 59 IN | TEMPERATURE: 99.5 F | WEIGHT: 162 LBS | BODY MASS INDEX: 32.66 KG/M2 | SYSTOLIC BLOOD PRESSURE: 182 MMHG | OXYGEN SATURATION: 93 % | RESPIRATION RATE: 19 BRPM | DIASTOLIC BLOOD PRESSURE: 82 MMHG | HEART RATE: 70 BPM

## 2025-07-08 PROBLEM — R53.1 WEAKNESS: Status: RESOLVED | Noted: 2025-07-07 | Resolved: 2025-07-08

## 2025-07-08 LAB
ANION GAP SERPL CALC-SCNC: 9 MMOL/L (ref 10–20)
BUN SERPL-MCNC: 20 MG/DL (ref 6–23)
CALCIUM SERPL-MCNC: 9.2 MG/DL (ref 8.6–10.3)
CHLORIDE SERPL-SCNC: 110 MMOL/L (ref 98–107)
CK SERPL-CCNC: 67 U/L (ref 0–215)
CO2 SERPL-SCNC: 25 MMOL/L (ref 21–32)
CREAT SERPL-MCNC: 1.06 MG/DL (ref 0.5–1.05)
EGFRCR SERPLBLD CKD-EPI 2021: 53 ML/MIN/1.73M*2
ERYTHROCYTE [DISTWIDTH] IN BLOOD BY AUTOMATED COUNT: 13 % (ref 11.5–14.5)
GLUCOSE BLD MANUAL STRIP-MCNC: 102 MG/DL (ref 74–99)
GLUCOSE BLD MANUAL STRIP-MCNC: 180 MG/DL (ref 74–99)
GLUCOSE SERPL-MCNC: 137 MG/DL (ref 74–99)
HCT VFR BLD AUTO: 32.6 % (ref 36–46)
HGB BLD-MCNC: 10.6 G/DL (ref 12–16)
MCH RBC QN AUTO: 33.5 PG (ref 26–34)
MCHC RBC AUTO-ENTMCNC: 32.5 G/DL (ref 32–36)
MCV RBC AUTO: 103 FL (ref 80–100)
NRBC BLD-RTO: 0 /100 WBCS (ref 0–0)
PLATELET # BLD AUTO: 277 X10*3/UL (ref 150–450)
POTASSIUM SERPL-SCNC: 4.2 MMOL/L (ref 3.5–5.3)
RBC # BLD AUTO: 3.16 X10*6/UL (ref 4–5.2)
SARS-COV-2 RNA RESP QL NAA+PROBE: NOT DETECTED
SODIUM SERPL-SCNC: 140 MMOL/L (ref 136–145)
WBC # BLD AUTO: 9.3 X10*3/UL (ref 4.4–11.3)

## 2025-07-08 PROCEDURE — 82550 ASSAY OF CK (CPK): CPT | Performed by: HOSPITALIST

## 2025-07-08 PROCEDURE — 87635 SARS-COV-2 COVID-19 AMP PRB: CPT | Performed by: HOSPITALIST

## 2025-07-08 PROCEDURE — G0378 HOSPITAL OBSERVATION PER HR: HCPCS

## 2025-07-08 PROCEDURE — 2500000002 HC RX 250 W HCPCS SELF ADMINISTERED DRUGS (ALT 637 FOR MEDICARE OP, ALT 636 FOR OP/ED): Performed by: HOSPITALIST

## 2025-07-08 PROCEDURE — 2500000001 HC RX 250 WO HCPCS SELF ADMINISTERED DRUGS (ALT 637 FOR MEDICARE OP): Performed by: HOSPITALIST

## 2025-07-08 PROCEDURE — 82374 ASSAY BLOOD CARBON DIOXIDE: CPT | Performed by: HOSPITALIST

## 2025-07-08 PROCEDURE — 85027 COMPLETE CBC AUTOMATED: CPT | Performed by: HOSPITALIST

## 2025-07-08 PROCEDURE — 2500000001 HC RX 250 WO HCPCS SELF ADMINISTERED DRUGS (ALT 637 FOR MEDICARE OP): Performed by: STUDENT IN AN ORGANIZED HEALTH CARE EDUCATION/TRAINING PROGRAM

## 2025-07-08 PROCEDURE — 82947 ASSAY GLUCOSE BLOOD QUANT: CPT

## 2025-07-08 PROCEDURE — 36415 COLL VENOUS BLD VENIPUNCTURE: CPT | Performed by: HOSPITALIST

## 2025-07-08 PROCEDURE — 96361 HYDRATE IV INFUSION ADD-ON: CPT

## 2025-07-08 PROCEDURE — 99239 HOSP IP/OBS DSCHRG MGMT >30: CPT | Performed by: HOSPITALIST

## 2025-07-08 PROCEDURE — 2500000004 HC RX 250 GENERAL PHARMACY W/ HCPCS (ALT 636 FOR OP/ED): Performed by: HOSPITALIST

## 2025-07-08 PROCEDURE — 2500000005 HC RX 250 GENERAL PHARMACY W/O HCPCS: Performed by: HOSPITALIST

## 2025-07-08 RX ORDER — AMLODIPINE BESYLATE 5 MG/1
5 TABLET ORAL DAILY
Status: DISCONTINUED | OUTPATIENT
Start: 2025-07-08 | End: 2025-07-08 | Stop reason: HOSPADM

## 2025-07-08 RX ORDER — TRAMADOL HYDROCHLORIDE 50 MG/1
50 TABLET, FILM COATED ORAL EVERY 8 HOURS PRN
Status: DISCONTINUED | OUTPATIENT
Start: 2025-07-08 | End: 2025-07-08 | Stop reason: HOSPADM

## 2025-07-08 RX ORDER — DIPHENHYDRAMINE HCL 25 MG
25 CAPSULE ORAL EVERY 6 HOURS PRN
Status: DISCONTINUED | OUTPATIENT
Start: 2025-07-08 | End: 2025-07-08 | Stop reason: HOSPADM

## 2025-07-08 RX ADMIN — METOPROLOL SUCCINATE 25 MG: 25 TABLET, EXTENDED RELEASE ORAL at 09:29

## 2025-07-08 RX ADMIN — FERROUS SULFATE TAB 325 MG (65 MG ELEMENTAL FE) 1 TABLET: 325 (65 FE) TAB at 09:28

## 2025-07-08 RX ADMIN — DIPHENHYDRAMINE HYDROCHLORIDE 25 MG: 25 CAPSULE ORAL at 04:35

## 2025-07-08 RX ADMIN — LOSARTAN POTASSIUM 100 MG: 100 TABLET, FILM COATED ORAL at 09:29

## 2025-07-08 RX ADMIN — AMLODIPINE BESYLATE 5 MG: 5 TABLET ORAL at 09:29

## 2025-07-08 RX ADMIN — ASPIRIN 81 MG: 81 TABLET, CHEWABLE ORAL at 09:29

## 2025-07-08 RX ADMIN — INSULIN LISPRO 1 UNITS: 100 INJECTION, SOLUTION INTRAVENOUS; SUBCUTANEOUS at 12:14

## 2025-07-08 RX ADMIN — LIDOCAINE 4% 1 PATCH: 40 PATCH TOPICAL at 09:29

## 2025-07-08 RX ADMIN — SODIUM CHLORIDE 100 ML/HR: 0.9 INJECTION, SOLUTION INTRAVENOUS at 05:44

## 2025-07-08 RX ADMIN — Medication 1 TABLET: at 09:29

## 2025-07-08 RX ADMIN — ACETAMINOPHEN 650 MG: 325 TABLET ORAL at 06:37

## 2025-07-08 ASSESSMENT — PAIN SCALES - GENERAL
PAINLEVEL_OUTOF10: 6
PAINLEVEL_OUTOF10: 0 - NO PAIN

## 2025-07-08 ASSESSMENT — PAIN - FUNCTIONAL ASSESSMENT
PAIN_FUNCTIONAL_ASSESSMENT: 0-10
PAIN_FUNCTIONAL_ASSESSMENT: 0-10

## 2025-07-08 ASSESSMENT — PAIN DESCRIPTION - LOCATION: LOCATION: BACK

## 2025-07-08 ASSESSMENT — PAIN DESCRIPTION - ORIENTATION: ORIENTATION: RIGHT

## 2025-07-08 NOTE — PROGRESS NOTES
Per DSC Auth Team, patient has received insurance auth to go to [facility].  relayed same to medical team, and awaits response.  - 1225: Per medical team, patient is medically appropriate for discharge today. SW met with patient at bedside to review discharge plan; Patient is under Observation status, so Medicare IM does not apply. SW to send updated notes and final orders to [facility] via [facility]Port when complete. BOLIVAR/Dulce to complete HENS document.  - 1330: Per nursing, patient's family intends to transport patient. DAKOTA updated [facility] re: same. Plan for patient is to discharge to [facility] today; insurance auth received 7/8. No further Care Transitions needs foreseen. Care Transitions available upon request. PRETTY Srinivasan

## 2025-07-08 NOTE — DISCHARGE SUMMARY
Discharge Diagnosis  Weakness           Issues Requiring Follow-Up  PCP    Discharge Meds     Medication List      START taking these medications     empagliflozin 10 mg tablet; Commonly known as: Jardiance; Take 1 tablet   (10 mg) by mouth once daily.     CHANGE how you take these medications     metFORMIN  mg 24 hr tablet; Commonly known as: Glucophage-XR; Take   2 tablets (1,000 mg) by mouth 2 times daily (morning and late afternoon).;   What changed: how much to take     CONTINUE taking these medications     acetaminophen 500 mg tablet; Commonly known as: Tylenol   amLODIPine 2.5 mg tablet; Commonly known as: Norvasc; Take 1 tablet (2.5   mg) by mouth once daily.   aspirin 81 mg chewable tablet   atorvastatin 80 mg tablet; Commonly known as: Lipitor; TAKE 1 TABLET BY   MOUTH ONCE  DAILY   BLINK TEARS OPHT   blood-glucose meter misc; Please provide 1 touch meter   cholecalciferol 125 mcg (5,000 units) tablet; Commonly known as: Vitamin   D-3   famotidine 20 mg tablet; Commonly known as: Pepcid; Take 1 tablet (20   mg) by mouth 2 times a day.   ferrous sulfate 325 mg (65 mg elemental) tablet   glipiZIDE XL 2.5 mg 24 hr tablet; Commonly known as: Glucotrol XL; Take   1 tablet (2.5 mg) by mouth once daily. Do not crush, chew, or split.   ibuprofen 600 mg tablet; Take 1 tablet (600 mg) by mouth 3 times a day   as needed for moderate pain (4 - 6).   losartan 100 mg tablet; Commonly known as: Cozaar; TAKE 1 TABLET BY   MOUTH ONCE  DAILY   magnesium oxide 400 mg tablet; Commonly known as: Mag-Ox   metoprolol succinate XL 50 mg 24 hr tablet; Commonly known as:   Toprol-XL; TAKE ONE-HALF TABLET BY MOUTH  TWICE DAILY   MULTIVITAMIN ORAL   OneTouch Ultra Test; Generic drug: blood sugar diagnostic; USE 1 STRIP   TWICE DAILY   rOPINIRole 1 mg tablet; Commonly known as: Requip; TAKE 1 TABLET BY   MOUTH ONCE  DAILY   sodium chloride 0.65 % nasal spray; Commonly known as: Ocean   VITAMIN B COMPLEX ORAL       Test Results  Pending At Discharge  Pending Labs       No current pending labs.            Hospital Course      79 y.o. female presenting with acute on chronic low back pain after a fall 5 days ago.  Pain was getting worse and finally patient was unable to get up and hence decided to visit hospital.  Denies fever chills nausea vomiting bowel bladder incontinence or sensory or motor deficits.  She has restless legs.  Denies any joint pains or skin rash otherwise.  No loss of consciousness but hit her head with fall.  No active bleeding.  No focal weakness numbness paralysis or paresthesias or tingling.  No flank pain hematuria dysuria.  No chest pain shortness of breath palpitations or syncope.  No headache neck pain.  No sweating tremors nervousness or lightheadedness.  CT lumbar spine with nondisplaced fracture involving right lateral disc osteophyte complex along inferior endplate of L2.  No compression fractures.  Advanced multilevel degenerative changes  CT head negative for acute process  She had right hip pain and x-ray did not show any acute process similarly CT neck and cervical area with no acute process.    Clinical impression  79-year-old female with history of  Coronary artery disease  Diabetes mellitus type 2  Hyperlipidemia  Hypertension  Restless legs  Uterine cancer  Presenting with  Generalized weakness deconditioning, ambulatory dysfunction  Acute on chronic low back pain  Fall  Resumed home meds  Maintain fall, aspiration, seizure precautions  Case management able to find placement for SNF/rehab  Recommend to follow spine surgery outpatient  Pain management and PT  Tylenol, tramadol and lidocaine for back pain here  PT OT following  On antihypertensives including losartan, Lotrimin, Jardiance, metoprolol  Statins for hyperlipidemia  Continues Requip, famotidine  On aspirin for CVA prophylaxis  Iron supplement for anemia, Nexium supportive  Encourage nutrition  DNR CCA  On Lovenox for DVT prophylaxis  here  Medically stable for discharge today    Pertinent Physical Exam At Time of Discharge  Physical Exam  General Appearance: AAO x 3,   Skin: skin color pink, warm, and dry; no suspicious rashes or lesions  Eyes : PERRL, EOM's intact  ENT: mucous membranes pink and moist  Neck: normocephalic  Respiratory: lungs clear to auscultation anteriorly; no wheezing, rhonchi, or crackles.   Heart: regular rate and rhythm.   Abdomen: Nondistended, positive bowel sounds x4, soft,  nontender  Extremities: no edema   Peripheral pulses: normal x4 extremities  Neuro: alert, coherent and conversant, no focal motor deficits  Outpatient Follow-Up  Future Appointments   Date Time Provider Department Center   9/16/2025  4:00 PM Grecia Jarvis MD [id number] None     Time to dc > 35 minutes    Ron Florentino MD

## 2025-07-08 NOTE — NURSING NOTE
Discharge Note: 7/8/2025 1415 Discharged via wheelchair to private car for family transport to [facility], paperwork packet sent with daughter, personal belongings taken by daughter, no distress noted, no complaints voiced. Ubaldo SANCHEZ

## 2025-07-08 NOTE — DISCHARGE INSTR - DIET
Type: Cardiac    Consistent Carb   Carb diet selection: CCD 60 gm/meal   Fat restriction: 70 gm fat   Sodium restriction: 2 - 3 grams Sodium     Aspiration precautions.

## 2025-07-08 NOTE — CARE PLAN
The clinical goals for the shift include pain control      Problem: Pain - Adult  Goal: Verbalizes/displays adequate comfort level or baseline comfort level  Outcome: Progressing     Problem: Safety - Adult  Goal: Free from fall injury  Outcome: Progressing     Problem: Discharge Planning  Goal: Discharge to home or other facility with appropriate resources  Outcome: Progressing     Problem: Chronic Conditions and Co-morbidities  Goal: Patient's chronic conditions and co-morbidity symptoms are monitored and maintained or improved  Outcome: Progressing     Problem: Nutrition  Goal: Nutrient intake appropriate for maintaining nutritional needs  Outcome: Progressing     Problem: Fall/Injury  Goal: Not fall by end of shift  Outcome: Progressing  Goal: Be free from injury by end of the shift  Outcome: Progressing  Goal: Verbalize understanding of personal risk factors for fall in the hospital  Outcome: Progressing  Goal: Verbalize understanding of risk factor reduction measures to prevent injury from fall in the home  Outcome: Progressing  Goal: Use assistive devices by end of the shift  Outcome: Progressing  Goal: Pace activities to prevent fatigue by end of the shift  Outcome: Progressing     Problem: Pain  Goal: Takes deep breaths with improved pain control throughout the shift  Outcome: Progressing  Goal: Turns in bed with improved pain control throughout the shift  Outcome: Progressing  Goal: Walks with improved pain control throughout the shift  Outcome: Progressing  Goal: Performs ADL's with improved pain control throughout shift  Outcome: Progressing  Goal: Participates in PT with improved pain control throughout the shift  Outcome: Progressing  Goal: Free from opioid side effects throughout the shift  Outcome: Progressing  Goal: Free from acute confusion related to pain meds throughout the shift  Outcome: Progressing     Problem: Diabetes  Goal: Achieve decreasing blood glucose levels by end of  shift  Outcome: Progressing  Goal: Increase stability of blood glucose readings by end of shift  Outcome: Progressing  Goal: Decrease in ketones present in urine by end of shift  Outcome: Progressing  Goal: Maintain electrolyte levels within acceptable range throughout shift  Outcome: Progressing  Goal: Maintain glucose levels >70mg/dl to <250mg/dl throughout shift  Outcome: Progressing  Goal: No changes in neurological exam by end of shift  Outcome: Progressing  Goal: Vital signs within normal range for age by end of shift  Outcome: Progressing  Goal: Increase self care and/or family involovement by end of shift  Outcome: Progressing

## 2025-07-10 ENCOUNTER — LAB REQUISITION (OUTPATIENT)
Dept: LAB | Facility: HOSPITAL | Age: 80
End: 2025-07-10
Payer: COMMERCIAL

## 2025-07-10 DIAGNOSIS — E78.5 HYPERLIPIDEMIA, UNSPECIFIED: ICD-10-CM

## 2025-07-10 DIAGNOSIS — N18.31 CHRONIC KIDNEY DISEASE, STAGE 3A (MULTI): ICD-10-CM

## 2025-07-10 DIAGNOSIS — E11.59 TYPE 2 DIABETES MELLITUS WITH OTHER CIRCULATORY COMPLICATIONS: ICD-10-CM

## 2025-07-10 LAB
ALBUMIN SERPL BCP-MCNC: 3.7 G/DL (ref 3.4–5)
ALP SERPL-CCNC: 55 U/L (ref 33–136)
ALT SERPL W P-5'-P-CCNC: 17 U/L (ref 7–45)
ANION GAP SERPL CALC-SCNC: 11 MMOL/L (ref 10–20)
AST SERPL W P-5'-P-CCNC: 16 U/L (ref 9–39)
BILIRUB SERPL-MCNC: 0.4 MG/DL (ref 0–1.2)
BUN SERPL-MCNC: 25 MG/DL (ref 6–23)
CALCIUM SERPL-MCNC: 9.1 MG/DL (ref 8.6–10.3)
CHLORIDE SERPL-SCNC: 109 MMOL/L (ref 98–107)
CHOLEST SERPL-MCNC: 125 MG/DL (ref 0–199)
CHOLESTEROL/HDL RATIO: 3
CO2 SERPL-SCNC: 26 MMOL/L (ref 21–32)
CREAT SERPL-MCNC: 1.24 MG/DL (ref 0.5–1.05)
EGFRCR SERPLBLD CKD-EPI 2021: 44 ML/MIN/1.73M*2
ERYTHROCYTE [DISTWIDTH] IN BLOOD BY AUTOMATED COUNT: 13 % (ref 11.5–14.5)
EST. AVERAGE GLUCOSE BLD GHB EST-MCNC: 171 MG/DL
GLUCOSE SERPL-MCNC: 142 MG/DL (ref 74–99)
HBA1C MFR BLD: 7.6 % (ref ?–5.7)
HCT VFR BLD AUTO: 33.1 % (ref 36–46)
HDLC SERPL-MCNC: 42 MG/DL
HGB BLD-MCNC: 10.7 G/DL (ref 12–16)
LDLC SERPL CALC-MCNC: 61 MG/DL
MCH RBC QN AUTO: 33.2 PG (ref 26–34)
MCHC RBC AUTO-ENTMCNC: 32.3 G/DL (ref 32–36)
MCV RBC AUTO: 103 FL (ref 80–100)
NON HDL CHOLESTEROL: 83 MG/DL (ref 0–149)
NRBC BLD-RTO: 0 /100 WBCS (ref 0–0)
PLATELET # BLD AUTO: 296 X10*3/UL (ref 150–450)
POTASSIUM SERPL-SCNC: 4.6 MMOL/L (ref 3.5–5.3)
PROT SERPL-MCNC: 5.3 G/DL (ref 6.4–8.2)
RBC # BLD AUTO: 3.22 X10*6/UL (ref 4–5.2)
SODIUM SERPL-SCNC: 141 MMOL/L (ref 136–145)
TRIGL SERPL-MCNC: 111 MG/DL (ref 0–149)
VLDL: 22 MG/DL (ref 0–40)
WBC # BLD AUTO: 9.4 X10*3/UL (ref 4.4–11.3)

## 2025-07-10 PROCEDURE — 80061 LIPID PANEL: CPT | Mod: OUT | Performed by: INTERNAL MEDICINE

## 2025-07-10 PROCEDURE — 85027 COMPLETE CBC AUTOMATED: CPT | Mod: OUT | Performed by: INTERNAL MEDICINE

## 2025-07-10 PROCEDURE — 84075 ASSAY ALKALINE PHOSPHATASE: CPT | Mod: OUT | Performed by: INTERNAL MEDICINE

## 2025-07-10 PROCEDURE — 82270 OCCULT BLOOD FECES: CPT | Mod: OUT,SAMLAB | Performed by: NURSE PRACTITIONER

## 2025-07-10 PROCEDURE — 83036 HEMOGLOBIN GLYCOSYLATED A1C: CPT | Mod: OUT,SAMLAB | Performed by: INTERNAL MEDICINE

## 2025-07-10 PROCEDURE — 36415 COLL VENOUS BLD VENIPUNCTURE: CPT | Mod: OUT | Performed by: INTERNAL MEDICINE

## 2025-07-11 ENCOUNTER — LAB REQUISITION (OUTPATIENT)
Dept: LAB | Facility: HOSPITAL | Age: 80
End: 2025-07-11
Payer: COMMERCIAL

## 2025-07-11 DIAGNOSIS — R19.5 OTHER FECAL ABNORMALITIES: ICD-10-CM

## 2025-07-12 LAB — HEMOCCULT SP1 STL QL: NEGATIVE

## 2025-07-18 ENCOUNTER — NURSING HOME VISIT (OUTPATIENT)
Dept: POST ACUTE CARE | Facility: EXTERNAL LOCATION | Age: 80
End: 2025-07-18
Payer: COMMERCIAL

## 2025-07-18 DIAGNOSIS — S32.028D OTHER FRACTURE OF SECOND LUMBAR VERTEBRA, SUBSEQUENT ENCOUNTER FOR FRACTURE WITH ROUTINE HEALING: Primary | ICD-10-CM

## 2025-07-18 PROCEDURE — 99304 1ST NF CARE SF/LOW MDM 25: CPT | Performed by: INTERNAL MEDICINE

## 2025-07-18 NOTE — LETTER
Patient: Terri Rolon  : 1945    Encounter Date: 2025    Pt was seen in the NH.  78 yo f with PMH HTN HLD RLS here for rehab afte L2 fx, feels fine now , ready for dc  Pt is in usual state , no complaint  General appearance: Comfortable, no distress  ROS: No SOB  Medications reviewed  Head: Normal  Neck: Soft  Heart: Regular  Lungs: Clear  Abdomen: soft    Plan:   1)clinically doing fine, dc home today  2) To continue fu with PMD    Problem List Items Addressed This Visit    None  Visit Diagnoses         Other fracture of second lumbar vertebra, subsequent encounter for fracture with routine healing    -  Primary               Electronically Signed By: Dejon Sahu MD   25  8:41 PM

## 2025-07-19 ENCOUNTER — HOME HEALTH ADMISSION (OUTPATIENT)
Dept: HOME HEALTH SERVICES | Facility: HOME HEALTH | Age: 80
End: 2025-07-19
Payer: COMMERCIAL

## 2025-07-19 ENCOUNTER — DOCUMENTATION (OUTPATIENT)
Dept: HOME HEALTH SERVICES | Facility: HOME HEALTH | Age: 80
End: 2025-07-19
Payer: COMMERCIAL

## 2025-07-19 DIAGNOSIS — E11.9 TYPE 2 DIABETES MELLITUS WITHOUT COMPLICATION, WITHOUT LONG-TERM CURRENT USE OF INSULIN: ICD-10-CM

## 2025-07-19 DIAGNOSIS — I10 HYPERTENSION, UNSPECIFIED TYPE: ICD-10-CM

## 2025-07-19 NOTE — PROGRESS NOTES
Pt was seen in the NH.  80 yo f with PMH HTN HLD RLS here for rehab afte L2 fx, feels fine now , ready for dc  Pt is in usual state , no complaint  General appearance: Comfortable, no distress  ROS: No SOB  Medications reviewed  Head: Normal  Neck: Soft  Heart: Regular  Lungs: Clear  Abdomen: soft    Plan:   1)clinically doing fine, dc home today  2) To continue fu with PMD    Problem List Items Addressed This Visit    None  Visit Diagnoses         Other fracture of second lumbar vertebra, subsequent encounter for fracture with routine healing    -  Primary

## 2025-07-19 NOTE — HH CARE COORDINATION
Home Care received a Referral for Nursing, Physical Therapy, and Occupational Therapy. We have processed the referral for a Start of Care on 7/20/25 .     If you have any questions or concerns, please feel free to contact us at [phone]. Follow the prompts, enter your five digit zip code, and you will be directed to your care team on WEST 1.

## 2025-07-21 ENCOUNTER — HOME CARE VISIT (OUTPATIENT)
Dept: HOME HEALTH SERVICES | Facility: HOME HEALTH | Age: 80
End: 2025-07-21
Payer: COMMERCIAL

## 2025-07-21 VITALS
HEIGHT: 59 IN | DIASTOLIC BLOOD PRESSURE: 64 MMHG | SYSTOLIC BLOOD PRESSURE: 130 MMHG | HEART RATE: 84 BPM | TEMPERATURE: 97.4 F | WEIGHT: 162 LBS | RESPIRATION RATE: 18 BRPM | OXYGEN SATURATION: 96 % | BODY MASS INDEX: 32.66 KG/M2

## 2025-07-21 DIAGNOSIS — G47.33 OBSTRUCTIVE SLEEP APNEA: ICD-10-CM

## 2025-07-21 PROCEDURE — G0299 HHS/HOSPICE OF RN EA 15 MIN: HCPCS

## 2025-07-21 RX ORDER — LANCETS
1 EACH MISCELLANEOUS 2 TIMES DAILY
Qty: 100 EACH | Refills: 11 | Status: SHIPPED | OUTPATIENT
Start: 2025-07-21

## 2025-07-21 RX ORDER — LANCETS
1 EACH MISCELLANEOUS 2 TIMES DAILY
COMMUNITY
End: 2025-07-21 | Stop reason: SDUPTHER

## 2025-07-21 RX ORDER — DEXTROSE 4 G
TABLET,CHEWABLE ORAL
Qty: 1 EACH | Refills: 0 | Status: SHIPPED | OUTPATIENT
Start: 2025-07-21

## 2025-07-21 RX ORDER — BLOOD SUGAR DIAGNOSTIC
1 STRIP MISCELLANEOUS 2 TIMES DAILY
Qty: 200 STRIP | Refills: 2 | Status: SHIPPED | OUTPATIENT
Start: 2025-07-21 | End: 2025-07-23

## 2025-07-21 RX ORDER — AMLODIPINE BESYLATE 2.5 MG/1
2.5 TABLET ORAL
Qty: 90 TABLET | Refills: 3 | Status: SHIPPED | OUTPATIENT
Start: 2025-07-21

## 2025-07-23 ENCOUNTER — HOME CARE VISIT (OUTPATIENT)
Dept: HOME HEALTH SERVICES | Facility: HOME HEALTH | Age: 80
End: 2025-07-23
Payer: COMMERCIAL

## 2025-07-23 DIAGNOSIS — E11.9 TYPE 2 DIABETES MELLITUS WITHOUT COMPLICATION, WITHOUT LONG-TERM CURRENT USE OF INSULIN: ICD-10-CM

## 2025-07-23 PROCEDURE — G0151 HHCP-SERV OF PT,EA 15 MIN: HCPCS

## 2025-07-23 RX ORDER — IBUPROFEN 200 MG
1 CAPSULE ORAL DAILY
COMMUNITY
End: 2025-07-23 | Stop reason: SDUPTHER

## 2025-07-23 RX ORDER — IBUPROFEN 200 MG
1 CAPSULE ORAL DAILY
Qty: 50 EACH | Refills: 11 | Status: SHIPPED | OUTPATIENT
Start: 2025-07-23

## 2025-07-23 SDOH — HEALTH STABILITY: PHYSICAL HEALTH: EXERCISE TYPE: SKILLED THERAPEUTIC EXERCISES

## 2025-07-23 ASSESSMENT — ENCOUNTER SYMPTOMS
HIGHEST PAIN SEVERITY IN PAST 24 HOURS: 4/10
PERSON REPORTING PAIN: PATIENT
PAIN LOCATION - RELIEVING FACTORS: REST AND TYLENOL
PAIN LOCATION - PAIN SEVERITY: 0/10
PAIN LOCATION: GENERALIZED
PAIN LOCATION - PAIN SEVERITY: 6/10
CHANGE IN APPETITE: UNCHANGED
SLEEP QUALITY: ADEQUATE
AGGRESSION WITHIN DEFINED LIMITS: 1
PAIN LOCATION - PAIN FREQUENCY: INTERMITTENT
PAIN: 1
APPETITE LEVEL: GOOD
STOOL FREQUENCY: DAILY
PAIN: 1
PAIN LOCATION - EXACERBATING FACTORS: CHRONIC
PAIN LOCATION - PAIN QUALITY: ACHY
LAST BOWEL MOVEMENT: 67407
BOWEL PATTERN NORMAL: 1
PAIN LOCATION: BACK
LOWER EXTREMITY EDEMA: 1
SUBJECTIVE PAIN PROGRESSION: UNCHANGED
MUSCLE WEAKNESS: 1
ANGER WITHIN DEFINED LIMITS: 1
PERSON REPORTING PAIN: PATIENT

## 2025-07-23 ASSESSMENT — ACTIVITIES OF DAILY LIVING (ADL)
CURRENT_FUNCTION: MAXIMUM ASSIST
ENTERING_EXITING_HOME: CONTACT GUARD ASSIST
LIGHT HOUSEKEEPING: NEEDS ASSISTANCE
PHYSICAL_TRANSFERS_DEVICES: BODY SUPPORT
AMBULATION ASSISTANCE: STAND BY ASSIST
PHYSICAL TRANSFERS ASSESSED: 1
PREPARING MEALS: NEEDS ASSISTANCE
AMBULATION ASSISTANCE: MAXIMUM ASSIST
AMBULATION ASSISTANCE: STAND BY ASSIST
CURRENT_FUNCTION: STAND BY ASSIST
AMBULATION ASSISTANCE: 1
AMBULATION_DISTANCE/DURATION_TOLERATED: 40 FT
BATHING_CURRENT_FUNCTION: CONTACT GUARD ASSIST
AMBULATION ASSISTANCE: 1
OASIS_M1830: 03
PHYSICAL_TRANSFERS_DEVICES: WALKER
PHYSICAL TRANSFERS ASSESSED: 1
HOUSEKEEPING ASSESSED: 1
TRANSPORTATION ASSESSED: 1
AMBULATION ASSISTANCE ON FLAT SURFACES: 1
BATHING ASSESSED: 1
TRANSPORTATION: DEPENDENT

## 2025-07-23 NOTE — HOME HEALTH
GOALS: PATIENT WILL DEMONSTRATE IMPROVED PAIN PERCEPTION, ENDURANCE, STRENGTH, MOBILITY, AND BALANCE.  SUBJECTIVE: THE PATIENT REPORTED CHRONIC LOW BACK PAIN. PATIENT REPORTED DIFFICULTY WITH AMBULATION.  PRIOR LEVEL OF FUNCTION: DEPENDENT.  OBJECTIVE: MANUAL MUSCLE TESTING WAS PERFORMED TO DETERMINE BOTH CORE AND LE STRENGTH. PATIENT DEMONSTRATED 2-/5 MUSCLE STRENGTH TO CORE AND FORD LE'S. BALANCE TESTING WAS PERFORMED WHICH SHOWED PATIENT TO BE A HIGH FALL RISK. PATIENT WAS ABLE TO WALK 40 FEET WITH RW, BUT DEMONSTRATED DIFFICULTY WITH TRANSFERS FROM STS AND GAIT ABNORMALITIES ALONG WITH BALANCE IMPAIRMENT.  THERAPEUTIC ACTIVITIES: MMT, TUG, MOBILITY, AND GAIT ASSESSMENT COMPLETED  ASSESSMENT: DEMONSTRATED GROSSLY REDUCED STRENGTH AND ENDURANCE SECONDARY TO ASSOCIATED COMORBID CONDITIONS. PATIENT, PRESENTLY, IS A FALL RISK, CURRENTLY AMBULATING WITH A WALKER. FALL PRECAUTIONS DISCUSSED.  PLAN: CONSIDER COMORBID FACTORS WHEN IMPLEMENTING POC. CONTINUE WITH GENERAL ENDURANCE AND STRENGTH TRAINING UE'S AND LE'S, GAIT TRAINING ACTIVITIES, BALANCE AND PROPRIOCEPTIVE TRAINING, AND FALL PREVENTION STRATEGIES AS PER PT POC. PROGRESS AS TOLERATED.  PLAN FOR NEXT VISIT: SKILLED EXERCISES, BALANCE TRAINING, STRENGTH TRAINING, TRANSFER TRAINING.  REHAB POTENTIALS FOR STATED GOALS: GOOD  PATIENT STATED GOAL: ABLE TO WALK WITHOUT DIFFICULTY.

## 2025-07-25 ENCOUNTER — HOME CARE VISIT (OUTPATIENT)
Dept: HOME HEALTH SERVICES | Facility: HOME HEALTH | Age: 80
End: 2025-07-25
Payer: COMMERCIAL

## 2025-07-25 PROCEDURE — G0151 HHCP-SERV OF PT,EA 15 MIN: HCPCS

## 2025-07-25 SDOH — HEALTH STABILITY: PHYSICAL HEALTH: EXERCISE ACTIVITY: SKILLED THERAPEUTIC EXERCISES

## 2025-07-25 SDOH — HEALTH STABILITY: PHYSICAL HEALTH: EXERCISE TYPE: SKILLED THERAPEUTIC EXERCISES

## 2025-07-25 SDOH — HEALTH STABILITY: PHYSICAL HEALTH: PHYSICAL EXERCISE: 10

## 2025-07-25 SDOH — HEALTH STABILITY: PHYSICAL HEALTH: RESISTANCE: AS TOLERATED

## 2025-07-25 SDOH — HEALTH STABILITY: PHYSICAL HEALTH: EXERCISE ACTIVITIES SETS: 2

## 2025-07-25 SDOH — HEALTH STABILITY: PHYSICAL HEALTH: EXERCISE ACTIVITY: GAIT TRAINING

## 2025-07-25 SDOH — HEALTH STABILITY: PHYSICAL HEALTH: PHYSICAL EXERCISE: SITTING

## 2025-07-25 ASSESSMENT — ENCOUNTER SYMPTOMS
PAIN LOCATION: BACK
PAIN: 1
PAIN LOCATION - PAIN SEVERITY: 5/10
PERSON REPORTING PAIN: PATIENT

## 2025-07-25 ASSESSMENT — ACTIVITIES OF DAILY LIVING (ADL)
AMBULATION_DISTANCE/DURATION_TOLERATED: 50 FT
AMBULATION ASSISTANCE ON FLAT SURFACES: 1

## 2025-07-28 ENCOUNTER — HOME CARE VISIT (OUTPATIENT)
Dept: HOME HEALTH SERVICES | Facility: HOME HEALTH | Age: 80
End: 2025-07-28
Payer: COMMERCIAL

## 2025-07-28 PROCEDURE — G0151 HHCP-SERV OF PT,EA 15 MIN: HCPCS

## 2025-07-28 SDOH — HEALTH STABILITY: PHYSICAL HEALTH: EXERCISE ACTIVITIES SETS: 1

## 2025-07-28 SDOH — HEALTH STABILITY: PHYSICAL HEALTH: PHYSICAL EXERCISE: 10

## 2025-07-28 SDOH — HEALTH STABILITY: PHYSICAL HEALTH: EXERCISE ACTIVITY: GAIT TRAINING

## 2025-07-28 SDOH — HEALTH STABILITY: PHYSICAL HEALTH: EXERCISE ACTIVITIES SETS: 2

## 2025-07-28 SDOH — HEALTH STABILITY: PHYSICAL HEALTH

## 2025-07-28 SDOH — HEALTH STABILITY: PHYSICAL HEALTH: RESISTANCE: AS TOLERATED

## 2025-07-28 SDOH — HEALTH STABILITY: PHYSICAL HEALTH: EXERCISE ACTIVITY: STS TRANSFER TRAINING

## 2025-07-28 SDOH — HEALTH STABILITY: PHYSICAL HEALTH: EXERCISE TYPE: SKILLED THERAPEUTIC EXERCISES

## 2025-07-28 SDOH — HEALTH STABILITY: PHYSICAL HEALTH: EXERCISE ACTIVITY: SKILLED THERAPEUTIC EXERCISES

## 2025-07-28 SDOH — HEALTH STABILITY: PHYSICAL HEALTH: PHYSICAL EXERCISE: SITTING, STANDING

## 2025-07-28 ASSESSMENT — ENCOUNTER SYMPTOMS
PAIN LOCATION: BACK
PAIN LOCATION - PAIN SEVERITY: 4/10
PAIN: 1
PERSON REPORTING PAIN: PATIENT

## 2025-07-28 ASSESSMENT — ACTIVITIES OF DAILY LIVING (ADL): AMBULATION ASSISTANCE ON FLAT SURFACES: 1

## 2025-07-31 ENCOUNTER — TELEPHONE (OUTPATIENT)
Age: 80
End: 2025-07-31
Payer: COMMERCIAL

## 2025-07-31 ENCOUNTER — HOME CARE VISIT (OUTPATIENT)
Dept: HOME HEALTH SERVICES | Facility: HOME HEALTH | Age: 80
End: 2025-07-31
Payer: COMMERCIAL

## 2025-07-31 VITALS
RESPIRATION RATE: 18 BRPM | SYSTOLIC BLOOD PRESSURE: 140 MMHG | TEMPERATURE: 97.4 F | HEART RATE: 76 BPM | DIASTOLIC BLOOD PRESSURE: 64 MMHG | OXYGEN SATURATION: 96 %

## 2025-07-31 PROCEDURE — G0299 HHS/HOSPICE OF RN EA 15 MIN: HCPCS

## 2025-07-31 PROCEDURE — G0151 HHCP-SERV OF PT,EA 15 MIN: HCPCS

## 2025-07-31 SDOH — HEALTH STABILITY: PHYSICAL HEALTH: EXERCISE ACTIVITY: GAIT TRAINING

## 2025-07-31 SDOH — HEALTH STABILITY: PHYSICAL HEALTH: EXERCISE ACTIVITY: SKILLED THERAPEUTIC EXERCISES

## 2025-07-31 SDOH — HEALTH STABILITY: PHYSICAL HEALTH: RESISTANCE: AS TOLERATED

## 2025-07-31 SDOH — HEALTH STABILITY: PHYSICAL HEALTH: EXERCISE ACTIVITIES SETS: 2

## 2025-07-31 SDOH — HEALTH STABILITY: PHYSICAL HEALTH: PHYSICAL EXERCISE: 10

## 2025-07-31 SDOH — HEALTH STABILITY: PHYSICAL HEALTH: PHYSICAL EXERCISE: SITTING

## 2025-07-31 SDOH — HEALTH STABILITY: PHYSICAL HEALTH: EXERCISE TYPE: SKILLED THERAPEUTIC EXERCISES

## 2025-07-31 ASSESSMENT — ACTIVITIES OF DAILY LIVING (ADL)
AMBULATION ASSISTANCE ON FLAT SURFACES: 1
AMBULATION_DISTANCE/DURATION_TOLERATED: 120 FT

## 2025-07-31 ASSESSMENT — ENCOUNTER SYMPTOMS
PERSON REPORTING PAIN: PATIENT
PAIN: 1
PAIN LOCATION: BACK
PAIN LOCATION - PAIN SEVERITY: 4/10

## 2025-07-31 NOTE — TELEPHONE ENCOUNTER
Terri called she has been released from the nursing home and she needs a new  prescription for her incontinent  and CPAP supplies sent to [facility] ... She also needs a new sugar meter her insurance wont pay for the other one she said it has to be   Acucheck.

## 2025-08-01 ASSESSMENT — ENCOUNTER SYMPTOMS
PAIN LOCATION - PAIN SEVERITY: 5/10
PERSON REPORTING PAIN: PATIENT
LAST BOWEL MOVEMENT: 67417
STOOL FREQUENCY: DAILY
BOWEL PATTERN NORMAL: 1
PAIN LOCATION - PAIN FREQUENCY: CONSTANT
LOWER EXTREMITY EDEMA: 1
LOWEST PAIN SEVERITY IN PAST 24 HOURS: 5/10
PAIN LOCATION: BACK
HIGHEST PAIN SEVERITY IN PAST 24 HOURS: 5/10
SUBJECTIVE PAIN PROGRESSION: GRADUALLY IMPROVING
PAIN LOCATION - RELIEVING FACTORS: REST AND MEDICATIONS
PAIN LOCATION - EXACERBATING FACTORS: ACTIVITY AND POSITIONAL
PAIN LOCATION - PAIN QUALITY: ACHY
PAIN SEVERITY GOAL: 0/10
PAIN: 1

## 2025-08-01 ASSESSMENT — ACTIVITIES OF DAILY LIVING (ADL)
AMBULATION ASSISTANCE: 1
TRANSPORTATION ASSESSED: 1
PHYSICAL_TRANSFERS_DEVICES: ROLLATOR
TRANSPORTATION: DEPENDENT
LIGHT HOUSEKEEPING: DEPENDENT
HOUSEKEEPING ASSESSED: 1
PREPARING MEALS: NEEDS ASSISTANCE
PHYSICAL TRANSFERS ASSESSED: 1
CURRENT_FUNCTION: STAND BY ASSIST
AMBULATION ASSISTANCE: STAND BY ASSIST

## 2025-08-03 ENCOUNTER — HOME CARE VISIT (OUTPATIENT)
Dept: HOME HEALTH SERVICES | Facility: HOME HEALTH | Age: 80
End: 2025-08-03
Payer: COMMERCIAL

## 2025-08-03 VITALS
OXYGEN SATURATION: 93 % | HEART RATE: 76 BPM | RESPIRATION RATE: 18 BRPM | DIASTOLIC BLOOD PRESSURE: 88 MMHG | TEMPERATURE: 97 F | SYSTOLIC BLOOD PRESSURE: 148 MMHG

## 2025-08-03 PROCEDURE — G0299 HHS/HOSPICE OF RN EA 15 MIN: HCPCS

## 2025-08-03 ASSESSMENT — ACTIVITIES OF DAILY LIVING (ADL)
LIGHT HOUSEKEEPING: NEEDS ASSISTANCE
PHYSICAL TRANSFERS ASSESSED: 1
PHYSICAL_TRANSFERS_DEVICES: ROLLATOR
HOUSEKEEPING ASSESSED: 1
PREPARING MEALS: NEEDS ASSISTANCE
CURRENT_FUNCTION: STAND BY ASSIST
AMBULATION ASSISTANCE: 1
TRANSPORTATION ASSESSED: 1
TRANSPORTATION: DEPENDENT
AMBULATION ASSISTANCE: STAND BY ASSIST

## 2025-08-03 ASSESSMENT — ENCOUNTER SYMPTOMS
PERSON REPORTING PAIN: PATIENT
LOWER EXTREMITY EDEMA: 1
APPETITE LEVEL: GOOD
LAST BOWEL MOVEMENT: 67420
PAIN LOCATION: BACK
HIGHEST PAIN SEVERITY IN PAST 24 HOURS: 10/10
PAIN LOCATION - EXACERBATING FACTORS: ACTIVITY
STOOL FREQUENCY: DAILY
LOWEST PAIN SEVERITY IN PAST 24 HOURS: 6/10
PAIN LOCATION - PAIN FREQUENCY: CONSTANT
PAIN SEVERITY GOAL: 3/10
CHANGE IN APPETITE: UNCHANGED
PAIN: 1
SUBJECTIVE PAIN PROGRESSION: UNCHANGED
PAIN LOCATION - PAIN QUALITY: ACHY
PAIN LOCATION - PAIN SEVERITY: 7/10
PAIN LOCATION - RELIEVING FACTORS: REST AND MEDICATION
BOWEL PATTERN NORMAL: 1
MUSCLE WEAKNESS: 1

## 2025-08-04 ENCOUNTER — HOME CARE VISIT (OUTPATIENT)
Dept: HOME HEALTH SERVICES | Facility: HOME HEALTH | Age: 80
End: 2025-08-04
Payer: COMMERCIAL

## 2025-08-04 PROCEDURE — G0151 HHCP-SERV OF PT,EA 15 MIN: HCPCS

## 2025-08-04 SDOH — HEALTH STABILITY: PHYSICAL HEALTH: EXERCISE ACTIVITY: SKILLED THERAPEUTIC EXERCISES

## 2025-08-04 SDOH — HEALTH STABILITY: PHYSICAL HEALTH: PHYSICAL EXERCISE: 10

## 2025-08-04 SDOH — HEALTH STABILITY: PHYSICAL HEALTH: EXERCISE TYPE: SKILLED THERAPEUTIC EXERCISES

## 2025-08-04 SDOH — HEALTH STABILITY: PHYSICAL HEALTH: EXERCISE ACTIVITIES SETS: 2

## 2025-08-04 SDOH — HEALTH STABILITY: PHYSICAL HEALTH: PHYSICAL EXERCISE: SITTING

## 2025-08-04 SDOH — HEALTH STABILITY: PHYSICAL HEALTH: EXERCISE ACTIVITY: GAIT TRAINING

## 2025-08-04 SDOH — HEALTH STABILITY: PHYSICAL HEALTH: RESISTANCE: AS TOLERATED

## 2025-08-04 ASSESSMENT — ENCOUNTER SYMPTOMS
PAIN LOCATION: BACK
PAIN: 1
PERSON REPORTING PAIN: PATIENT
PAIN LOCATION - PAIN SEVERITY: 5/10

## 2025-08-04 ASSESSMENT — ACTIVITIES OF DAILY LIVING (ADL)
AMBULATION_DISTANCE/DURATION_TOLERATED: 220 FT
AMBULATION ASSISTANCE ON FLAT SURFACES: 1

## 2025-08-05 ENCOUNTER — OFFICE VISIT (OUTPATIENT)
Age: 80
End: 2025-08-05
Payer: COMMERCIAL

## 2025-08-05 ENCOUNTER — APPOINTMENT (OUTPATIENT)
Age: 80
End: 2025-08-05
Payer: COMMERCIAL

## 2025-08-05 VITALS
DIASTOLIC BLOOD PRESSURE: 80 MMHG | HEART RATE: 61 BPM | OXYGEN SATURATION: 96 % | BODY MASS INDEX: 31.77 KG/M2 | SYSTOLIC BLOOD PRESSURE: 160 MMHG | WEIGHT: 157.6 LBS | HEIGHT: 59 IN

## 2025-08-05 DIAGNOSIS — N18.31 CHRONIC KIDNEY DISEASE, STAGE 3A (MULTI): ICD-10-CM

## 2025-08-05 DIAGNOSIS — S32.028D OTHER CLOSED FRACTURE OF SECOND LUMBAR VERTEBRA WITH ROUTINE HEALING, SUBSEQUENT ENCOUNTER: Primary | ICD-10-CM

## 2025-08-05 DIAGNOSIS — E11.65 TYPE 2 DIABETES MELLITUS WITH HYPERGLYCEMIA, WITHOUT LONG-TERM CURRENT USE OF INSULIN: ICD-10-CM

## 2025-08-05 DIAGNOSIS — R11.0 CHRONIC NAUSEA: ICD-10-CM

## 2025-08-05 DIAGNOSIS — M20.42 HAMMER TOES OF BOTH FEET: ICD-10-CM

## 2025-08-05 DIAGNOSIS — E11.9 TYPE 2 DIABETES MELLITUS WITHOUT COMPLICATION, WITHOUT LONG-TERM CURRENT USE OF INSULIN: ICD-10-CM

## 2025-08-05 DIAGNOSIS — B37.0 ORAL THRUSH: ICD-10-CM

## 2025-08-05 DIAGNOSIS — M20.41 HAMMER TOES OF BOTH FEET: ICD-10-CM

## 2025-08-05 PROCEDURE — 3077F SYST BP >= 140 MM HG: CPT | Performed by: STUDENT IN AN ORGANIZED HEALTH CARE EDUCATION/TRAINING PROGRAM

## 2025-08-05 PROCEDURE — G2211 COMPLEX E/M VISIT ADD ON: HCPCS | Performed by: STUDENT IN AN ORGANIZED HEALTH CARE EDUCATION/TRAINING PROGRAM

## 2025-08-05 PROCEDURE — 1036F TOBACCO NON-USER: CPT | Performed by: STUDENT IN AN ORGANIZED HEALTH CARE EDUCATION/TRAINING PROGRAM

## 2025-08-05 PROCEDURE — 3079F DIAST BP 80-89 MM HG: CPT | Performed by: STUDENT IN AN ORGANIZED HEALTH CARE EDUCATION/TRAINING PROGRAM

## 2025-08-05 PROCEDURE — 1159F MED LIST DOCD IN RCRD: CPT | Performed by: STUDENT IN AN ORGANIZED HEALTH CARE EDUCATION/TRAINING PROGRAM

## 2025-08-05 PROCEDURE — 99214 OFFICE O/P EST MOD 30 MIN: CPT | Performed by: STUDENT IN AN ORGANIZED HEALTH CARE EDUCATION/TRAINING PROGRAM

## 2025-08-05 PROCEDURE — 1124F ACP DISCUSS-NO DSCNMKR DOCD: CPT | Performed by: STUDENT IN AN ORGANIZED HEALTH CARE EDUCATION/TRAINING PROGRAM

## 2025-08-05 PROCEDURE — 1160F RVW MEDS BY RX/DR IN RCRD: CPT | Performed by: STUDENT IN AN ORGANIZED HEALTH CARE EDUCATION/TRAINING PROGRAM

## 2025-08-05 RX ORDER — TRAMADOL HYDROCHLORIDE 50 MG/1
50 TABLET, FILM COATED ORAL EVERY 8 HOURS PRN
Qty: 14 TABLET | Refills: 0 | Status: SHIPPED | OUTPATIENT
Start: 2025-08-05

## 2025-08-05 RX ORDER — ONDANSETRON 4 MG/1
4 TABLET, ORALLY DISINTEGRATING ORAL EVERY 8 HOURS PRN
Qty: 20 TABLET | Refills: 0 | Status: SHIPPED | OUTPATIENT
Start: 2025-08-05 | End: 2025-08-12

## 2025-08-05 RX ORDER — LANCETS
1 EACH MISCELLANEOUS 4 TIMES DAILY
Qty: 100 EACH | Refills: 11 | Status: SHIPPED | OUTPATIENT
Start: 2025-08-05

## 2025-08-05 RX ORDER — NYSTATIN 100000 [USP'U]/ML
5 SUSPENSION ORAL 4 TIMES DAILY
Qty: 200 ML | Refills: 0 | Status: SHIPPED | OUTPATIENT
Start: 2025-08-05 | End: 2025-08-15

## 2025-08-05 ASSESSMENT — ENCOUNTER SYMPTOMS
OCCASIONAL FEELINGS OF UNSTEADINESS: 1
LOSS OF SENSATION IN FEET: 0
DEPRESSION: 0

## 2025-08-05 NOTE — PROGRESS NOTES
"Patient: Quyen Rolon \"Terri\"  : 1945  PCP: Grecia Jarvis MD  MRN: [id number]  Program: No programs to display       Quyen Rolon \"Terri\" is a 79 y.o. female presenting today for follow-up after being discharged from the hospital {Numbers; 1-14:82251} days ago. The main problem requiring admission was ***. The discharge summary and/or Transitional Care Management documentation was reviewed. Medication reconciliation was performed as indicated via the \"Charly as Reviewed\" timestamp.     Quyen Rolon \"Terri\" was contacted by Transitional Care Management services two days after her discharge. This encounter and supporting documentation was reviewed.    Review of Systems    /80   Pulse 61   Ht (!) 1.499 m (4' 11\")   Wt 71.5 kg (157 lb 9.6 oz)   SpO2 96%   BMI 31.83 kg/m²     Physical Exam    The complexity of medical decision making for this patient's transitional care is {DESC; MODERATE/HIGH:56761}.    Assessment/Plan   {Assess/PlanSmartLinks:84430}      Grecia Jarvis MD    "

## 2025-08-05 NOTE — PROGRESS NOTES
"Subjective:  Quyen Rolon \"Terri\" Ольга is a 79 y.o. female who presents to clinic today for Follow-up (Nursing Home discharge  and still in pain would like to know if you can give her tramidole... she needs a new test meter and supplies her insurance will only pay for the Accu check )      Came home from [facility] on July 18th. She is having home nursing and PT currently. She notes that with doing housework she is getting pain in her low back and also having muschle spasms. Still having soreness from where she fell. Nondisplaced fracture involving a right lateral disc osteophyte  complex along the inferior endplate of L2. Pain increased with increased activity.     She was unable to take Jardiance due to chest pain and increased palpitations. Now taking glipizide. Appetite has been curbed.    She has had burning in her tongue since her hospitalization.    She has hammertoes.     Review of Systems    Assessment/Plan:  Quyen Rolon \"Terri\" Ольга is a 79 y.o. female  who presents to clinic today to address the following issues:   1. Other closed fracture of second lumbar vertebra with routine healing, subsequent encounter  traMADol (Ultram) 50 mg tablet      2. Type 2 diabetes mellitus with hyperglycemia, without long-term current use of insulin  blood sugar diagnostic    Diabetic Shoes      3. Type 2 diabetes mellitus without complication, without long-term current use of insulin  lancets misc    Home blood glucose meter    Diabetic Shoes      4. Chronic nausea  ondansetron ODT (Zofran-ODT) 4 mg disintegrating tablet      5. Chronic kidney disease, stage 3a (Multi)  Basic metabolic panel    Basic metabolic panel      6. Oral thrush  nystatin (Mycostatin) 100,000 unit/mL suspension      7. Hammer toes of both feet  Diabetic Shoes        Fracture with back pain:  Chronic problem, new to provider, requires further management  Gave sparing prescription for tramadol after counseling patient that she " "should not use it when walking and it can increase her risk for falls    Diabetes:  Chronic problem, known to provider  Continue meds  Recheck hemoglobin A1c prior to follow-up  Diabetic shoes ordered  Order new glucometer that patient inferential,    Oral thrush:  Nystatin swish and swallow  Problem List Items Addressed This Visit       Type 2 diabetes mellitus without complication, without long-term current use of insulin    Overview   Hgb A1c 6.6 in June 2023.         Relevant Medications    blood sugar diagnostic    lancets misc    Other Relevant Orders    Home blood glucose meter    Diabetic Shoes    Chronic kidney disease, stage 3a (Multi)    Relevant Orders    Basic metabolic panel     Other Visit Diagnoses         Other closed fracture of second lumbar vertebra with routine healing, subsequent encounter    -  Primary    Relevant Medications    traMADol (Ultram) 50 mg tablet      Type 2 diabetes mellitus with hyperglycemia, without long-term current use of insulin        Relevant Medications    blood sugar diagnostic    Other Relevant Orders    Diabetic Shoes      Chronic nausea        Relevant Medications    ondansetron ODT (Zofran-ODT) 4 mg disintegrating tablet      Oral thrush        Relevant Medications    nystatin (Mycostatin) 100,000 unit/mL suspension      Hammer toes of both feet        Relevant Orders    Diabetic Shoes            Patient Instructions   Take 1000mg tylenol twice daily.  Tramadol 50mg sparingly.     Follow up: 2 months    Return precautions discussed.  An After Visit Summary was given to the patient.  All questions were answered and patient in agreement with plan.    Objective:  /80   Pulse 61   Ht (!) 1.499 m (4' 11\")   Wt 71.5 kg (157 lb 9.6 oz)   SpO2 96%   BMI 31.83 kg/m²     Physical Exam  Vitals and nursing note reviewed.   Constitutional:       General: She is not in acute distress.     Appearance: Normal appearance.   HENT:      Head: Normocephalic and atraumatic. "      Mouth/Throat:      Mouth: Mucous membranes are moist.      Comments: White plaque on tongue    Eyes:      General: No scleral icterus.        Right eye: No discharge.         Left eye: No discharge.      Extraocular Movements: Extraocular movements intact.      Conjunctiva/sclera: Conjunctivae normal.       Cardiovascular:      Rate and Rhythm: Normal rate and regular rhythm.      Comments: PVCs prevelant  Pulmonary:      Effort: Pulmonary effort is normal. No respiratory distress.      Breath sounds: Normal breath sounds.   Feet:      Right foot:      Skin integrity: Skin integrity normal. No ulcer, blister, skin breakdown, erythema or warmth.      Left foot:      Skin integrity: Skin integrity normal. No ulcer, blister, skin breakdown, erythema or warmth.      Comments: Mild hammertoes    Skin:     General: Skin is warm and dry.     Neurological:      General: No focal deficit present.      Mental Status: She is alert and oriented to person, place, and time.     Psychiatric:         Attention and Perception: Attention normal.         Mood and Affect: Mood normal.         Speech: Speech normal.         Behavior: Behavior normal.         Cognition and Memory: Cognition and memory normal.         Judgment: Judgment normal.         I spent 31 minutes in total time for this visit including all related clinical activities before, during, and after the visit excluding other billable activities/procedure time.     Grecia Jarvis MD

## 2025-08-06 ENCOUNTER — TELEPHONE (OUTPATIENT)
Age: 80
End: 2025-08-06
Payer: COMMERCIAL

## 2025-08-06 ENCOUNTER — HOME CARE VISIT (OUTPATIENT)
Dept: HOME HEALTH SERVICES | Facility: HOME HEALTH | Age: 80
End: 2025-08-06
Payer: COMMERCIAL

## 2025-08-06 DIAGNOSIS — E11.65 TYPE 2 DIABETES MELLITUS WITH HYPERGLYCEMIA, WITHOUT LONG-TERM CURRENT USE OF INSULIN: Primary | ICD-10-CM

## 2025-08-06 PROCEDURE — G0151 HHCP-SERV OF PT,EA 15 MIN: HCPCS

## 2025-08-06 RX ORDER — DEXTROSE 4 G
TABLET,CHEWABLE ORAL
Qty: 1 EACH | Refills: 0 | Status: SHIPPED | OUTPATIENT
Start: 2025-08-06

## 2025-08-06 SDOH — HEALTH STABILITY: PHYSICAL HEALTH: EXERCISE ACTIVITIES SETS: 2

## 2025-08-06 SDOH — HEALTH STABILITY: PHYSICAL HEALTH: RESISTANCE: AS TOLERATED

## 2025-08-06 SDOH — HEALTH STABILITY: PHYSICAL HEALTH: PHYSICAL EXERCISE: 10

## 2025-08-06 SDOH — HEALTH STABILITY: PHYSICAL HEALTH: EXERCISE TYPE: SKILLED THERAPEUTIC EXERCISES

## 2025-08-06 SDOH — HEALTH STABILITY: PHYSICAL HEALTH: EXERCISE ACTIVITY: SKILLED THERAPEUTIC EXERCISES

## 2025-08-06 SDOH — HEALTH STABILITY: PHYSICAL HEALTH: PHYSICAL EXERCISE: SITTING

## 2025-08-06 ASSESSMENT — ACTIVITIES OF DAILY LIVING (ADL): AMBULATION ASSISTANCE ON FLAT SURFACES: 1

## 2025-08-06 ASSESSMENT — ENCOUNTER SYMPTOMS
PAIN LOCATION - RELIEVING FACTORS: PAIN MEDS
PAIN LOCATION - PAIN SEVERITY: 6/10
PAIN: 1
PAIN LOCATION - EXACERBATING FACTORS: BENDING, TRANSFERS
PERSON REPORTING PAIN: PATIENT
PAIN LOCATION: BACK

## 2025-08-06 NOTE — TELEPHONE ENCOUNTER
Patient called stated she needed a prescription for sugar meter I  looked in her notes it said it was sent I then call the pharmacy and they said they need a prescription for the accu check meter its self they have one for the strips and lancets but not the meter.

## 2025-08-08 NOTE — TELEPHONE ENCOUNTER
Called the patient 8/8/25 to make sure she got her meter she said it is being delivered on Monday

## 2025-08-11 ENCOUNTER — HOME CARE VISIT (OUTPATIENT)
Dept: HOME HEALTH SERVICES | Facility: HOME HEALTH | Age: 80
End: 2025-08-11
Payer: COMMERCIAL

## 2025-08-11 DIAGNOSIS — E11.9 TYPE 2 DIABETES MELLITUS WITHOUT COMPLICATION, WITHOUT LONG-TERM CURRENT USE OF INSULIN: ICD-10-CM

## 2025-08-11 PROCEDURE — G0157 HHC PT ASSISTANT EA 15: HCPCS | Mod: CQ

## 2025-08-11 RX ORDER — LANCETS
1 EACH MISCELLANEOUS 4 TIMES DAILY
Qty: 100 EACH | Refills: 11 | Status: SHIPPED | OUTPATIENT
Start: 2025-08-11

## 2025-08-11 SDOH — HEALTH STABILITY: PHYSICAL HEALTH: EXERCISE TYPE: SEATED

## 2025-08-11 SDOH — HEALTH STABILITY: PHYSICAL HEALTH
EXERCISE COMMENTS: PT COMPLETED SEATED THER EX X15 EA:  ANKLE PUMPS     MARCHING           LAQS                HIP ABD/ADD     HS CURLS            HIP ROT

## 2025-08-11 ASSESSMENT — ACTIVITIES OF DAILY LIVING (ADL)
PHYSICAL TRANSFERS ASSESSED: 1
AMBULATION ASSISTANCE: SUPERVISION
AMBULATION ASSISTANCE: 1
AMBULATION ASSISTANCE ON FLAT SURFACES: 1
CURRENT_FUNCTION: SUPERVISION

## 2025-08-11 ASSESSMENT — ENCOUNTER SYMPTOMS
PAIN: 1
LOWEST PAIN SEVERITY IN PAST 24 HOURS: 2/10
MUSCLE WEAKNESS: 1
SUBJECTIVE PAIN PROGRESSION: WAXING AND WANING
PAIN LOCATION - PAIN QUALITY: CHRONIC
PAIN LOCATION - PAIN SEVERITY: 2/10
PAIN LOCATION - EXACERBATING FACTORS: EXTENDED ACTIVITY
PAIN LOCATION - PAIN FREQUENCY: INTERMITTENT
PAIN SEVERITY GOAL: 0/10
PAIN LOCATION - RELIEVING FACTORS: MEDS/POSITIONING
LIMITED RANGE OF MOTION: 1
PERSON REPORTING PAIN: PATIENT
NAUSEA: 1
PAIN LOCATION: BACK
HIGHEST PAIN SEVERITY IN PAST 24 HOURS: 7/10

## 2025-08-13 ENCOUNTER — HOME CARE VISIT (OUTPATIENT)
Dept: HOME HEALTH SERVICES | Facility: HOME HEALTH | Age: 80
End: 2025-08-13
Payer: COMMERCIAL

## 2025-08-13 VITALS
SYSTOLIC BLOOD PRESSURE: 160 MMHG | DIASTOLIC BLOOD PRESSURE: 68 MMHG | RESPIRATION RATE: 18 BRPM | TEMPERATURE: 97.8 F | HEART RATE: 82 BPM | OXYGEN SATURATION: 98 %

## 2025-08-13 PROCEDURE — G0299 HHS/HOSPICE OF RN EA 15 MIN: HCPCS

## 2025-08-14 ASSESSMENT — ENCOUNTER SYMPTOMS
CHANGE IN APPETITE: UNCHANGED
SUBJECTIVE PAIN PROGRESSION: GRADUALLY IMPROVING
APPETITE LEVEL: GOOD
PAIN LOCATION - PAIN SEVERITY: 2/10
PAIN: 1
MUSCLE WEAKNESS: 1
BOWEL PATTERN NORMAL: 1
PAIN LOCATION - PAIN QUALITY: ACHY
LAST BOWEL MOVEMENT: 67430
PAIN LOCATION - EXACERBATING FACTORS: ACTIVITY AND POSITIONAL
PAIN LOCATION - PAIN FREQUENCY: CONSTANT
PAIN SEVERITY GOAL: 0/10
STOOL FREQUENCY: DAILY
LOWEST PAIN SEVERITY IN PAST 24 HOURS: 2/10
HIGHEST PAIN SEVERITY IN PAST 24 HOURS: 10/10
PAIN LOCATION - RELIEVING FACTORS: REST AND MEDICATIONS
PAIN LOCATION: BACK
PERSON REPORTING PAIN: PATIENT

## 2025-08-14 ASSESSMENT — ACTIVITIES OF DAILY LIVING (ADL)
CURRENT_FUNCTION: STAND BY ASSIST
TRANSPORTATION ASSESSED: 1
PREPARING MEALS: NEEDS ASSISTANCE
PHYSICAL_TRANSFERS_DEVICES: ROLLATOR
TRANSPORTATION: NEEDS ASSISTANCE
HOUSEKEEPING ASSESSED: 1
LIGHT HOUSEKEEPING: NEEDS ASSISTANCE
PHYSICAL TRANSFERS ASSESSED: 1

## 2025-08-19 ENCOUNTER — HOME CARE VISIT (OUTPATIENT)
Dept: HOME HEALTH SERVICES | Facility: HOME HEALTH | Age: 80
End: 2025-08-19
Payer: COMMERCIAL

## 2025-08-19 PROCEDURE — G0151 HHCP-SERV OF PT,EA 15 MIN: HCPCS

## 2025-08-19 SDOH — HEALTH STABILITY: PHYSICAL HEALTH: EXERCISE TYPE: SKILLED THERAPEUTIC EXERCISES

## 2025-08-19 ASSESSMENT — ENCOUNTER SYMPTOMS
PERSON REPORTING PAIN: PATIENT
PAIN: 1
PAIN LOCATION - PAIN SEVERITY: 5/10
PAIN LOCATION: BACK

## 2025-08-19 ASSESSMENT — ACTIVITIES OF DAILY LIVING (ADL)
AMBULATION ASSISTANCE: STAND BY ASSIST
AMBULATION_DISTANCE/DURATION_TOLERATED: 260 FT
AMBULATION ASSISTANCE ON FLAT SURFACES: 1
CURRENT_FUNCTION: STAND BY ASSIST

## 2025-08-21 ENCOUNTER — APPOINTMENT (OUTPATIENT)
Age: 80
End: 2025-08-21
Payer: COMMERCIAL

## 2025-08-21 ENCOUNTER — HOME CARE VISIT (OUTPATIENT)
Dept: HOME HEALTH SERVICES | Facility: HOME HEALTH | Age: 80
End: 2025-08-21
Payer: COMMERCIAL

## 2025-08-21 PROCEDURE — G0151 HHCP-SERV OF PT,EA 15 MIN: HCPCS

## 2025-08-21 SDOH — HEALTH STABILITY: PHYSICAL HEALTH: PHYSICAL EXERCISE: 5

## 2025-08-21 SDOH — HEALTH STABILITY: PHYSICAL HEALTH: EXERCISE ACTIVITY: STS TRANSFERS

## 2025-08-21 SDOH — HEALTH STABILITY: PHYSICAL HEALTH: EXERCISE TYPE: SKILLED THERAPEUTIC EXERCISES

## 2025-08-21 SDOH — HEALTH STABILITY: PHYSICAL HEALTH: RESISTANCE: AS TOLERATED

## 2025-08-21 SDOH — HEALTH STABILITY: PHYSICAL HEALTH: PHYSICAL EXERCISE: 10

## 2025-08-21 SDOH — HEALTH STABILITY: PHYSICAL HEALTH: EXERCISE ACTIVITY: SKILLED THERAPEUTIC EXERCISES

## 2025-08-21 SDOH — HEALTH STABILITY: PHYSICAL HEALTH: EXERCISE ACTIVITIES SETS: 1

## 2025-08-21 SDOH — HEALTH STABILITY: PHYSICAL HEALTH: PHYSICAL EXERCISE: SITTING

## 2025-08-21 SDOH — HEALTH STABILITY: PHYSICAL HEALTH: EXERCISE ACTIVITIES SETS: 2

## 2025-08-21 ASSESSMENT — ENCOUNTER SYMPTOMS
PAIN LOCATION - PAIN SEVERITY: 6/10
PAIN: 1
PERSON REPORTING PAIN: PATIENT
PAIN LOCATION: BACK

## 2025-08-21 ASSESSMENT — ACTIVITIES OF DAILY LIVING (ADL): AMBULATION ASSISTANCE ON FLAT SURFACES: 1

## 2025-08-26 ENCOUNTER — HOME CARE VISIT (OUTPATIENT)
Dept: HOME HEALTH SERVICES | Facility: HOME HEALTH | Age: 80
End: 2025-08-26
Payer: COMMERCIAL

## 2025-08-26 PROCEDURE — G0151 HHCP-SERV OF PT,EA 15 MIN: HCPCS

## 2025-08-26 SDOH — HEALTH STABILITY: PHYSICAL HEALTH: PHYSICAL EXERCISE: SITTING

## 2025-08-26 SDOH — HEALTH STABILITY: PHYSICAL HEALTH: EXERCISE ACTIVITY: GAIT TRAINING

## 2025-08-26 SDOH — HEALTH STABILITY: PHYSICAL HEALTH: RESISTANCE: AS TOLERATED

## 2025-08-26 SDOH — HEALTH STABILITY: PHYSICAL HEALTH: PHYSICAL EXERCISE: 10

## 2025-08-26 SDOH — HEALTH STABILITY: PHYSICAL HEALTH: EXERCISE TYPE: SKILLED THERAPEUTIC EXERCISES

## 2025-08-26 SDOH — HEALTH STABILITY: PHYSICAL HEALTH: EXERCISE ACTIVITY: SKILLED THERAPEUTIC EXERCISES

## 2025-08-26 SDOH — HEALTH STABILITY: PHYSICAL HEALTH: EXERCISE ACTIVITIES SETS: 2

## 2025-08-26 ASSESSMENT — ACTIVITIES OF DAILY LIVING (ADL)
AMBULATION_DISTANCE/DURATION_TOLERATED: 100 FT
AMBULATION ASSISTANCE ON FLAT SURFACES: 1

## 2025-08-26 ASSESSMENT — ENCOUNTER SYMPTOMS
PAIN LOCATION - PAIN SEVERITY: 6/10
PERSON REPORTING PAIN: PATIENT
PAIN LOCATION: BACK
PAIN: 1

## 2025-08-28 ENCOUNTER — HOME CARE VISIT (OUTPATIENT)
Dept: HOME HEALTH SERVICES | Facility: HOME HEALTH | Age: 80
End: 2025-08-28
Payer: COMMERCIAL

## 2025-08-28 RX ORDER — METFORMIN HYDROCHLORIDE 500 MG/1
TABLET, EXTENDED RELEASE ORAL
Qty: 240 TABLET | Refills: 5 | OUTPATIENT
Start: 2025-08-28

## 2025-09-01 ENCOUNTER — HOME CARE VISIT (OUTPATIENT)
Dept: HOME HEALTH SERVICES | Facility: HOME HEALTH | Age: 80
End: 2025-09-01
Payer: COMMERCIAL

## 2025-09-01 PROCEDURE — G0151 HHCP-SERV OF PT,EA 15 MIN: HCPCS

## 2025-09-01 SDOH — HEALTH STABILITY: PHYSICAL HEALTH: EXERCISE TYPE: SKILLED HEP

## 2025-09-01 ASSESSMENT — ENCOUNTER SYMPTOMS
HIGHEST PAIN SEVERITY IN PAST 24 HOURS: 7/10
PAIN LOCATION: BACK
PAIN LOCATION - EXACERBATING FACTORS: AMBULATION, TRANSFERS
PAIN: 1
PAIN SEVERITY GOAL: 1/10
PAIN LOCATION - PAIN SEVERITY: 5/10
LOWEST PAIN SEVERITY IN PAST 24 HOURS: 4/10
PERSON REPORTING PAIN: PATIENT
SUBJECTIVE PAIN PROGRESSION: WAXING AND WANING
PAIN LOCATION - PAIN FREQUENCY: INTERMITTENT
PAIN LOCATION - RELIEVING FACTORS: PAIN MEDS AS NEEDED

## 2025-09-01 ASSESSMENT — ACTIVITIES OF DAILY LIVING (ADL)
AMBULATION ASSISTANCE: STAND BY ASSIST
OASIS_M1830: 01
CURRENT_FUNCTION: STAND BY ASSIST
HOME_HEALTH_OASIS: 00
AMBULATION ASSISTANCE ON FLAT SURFACES: 1

## 2025-09-16 ENCOUNTER — APPOINTMENT (OUTPATIENT)
Age: 80
End: 2025-09-16
Payer: COMMERCIAL

## 2025-10-30 ENCOUNTER — APPOINTMENT (OUTPATIENT)
Age: 80
End: 2025-10-30
Payer: COMMERCIAL